# Patient Record
Sex: MALE | Race: WHITE | Employment: OTHER | URBAN - METROPOLITAN AREA
[De-identification: names, ages, dates, MRNs, and addresses within clinical notes are randomized per-mention and may not be internally consistent; named-entity substitution may affect disease eponyms.]

---

## 2017-02-22 DIAGNOSIS — Z85.51 PERSONAL HISTORY OF MALIGNANT NEOPLASM OF BLADDER: Primary | ICD-10-CM

## 2017-02-23 ENCOUNTER — OFFICE VISIT (OUTPATIENT)
Dept: UROLOGY | Facility: CLINIC | Age: 67
End: 2017-02-23
Payer: COMMERCIAL

## 2017-02-23 VITALS — WEIGHT: 133 LBS | HEIGHT: 68 IN | BODY MASS INDEX: 20.16 KG/M2

## 2017-02-23 DIAGNOSIS — Z85.51 PERSONAL HISTORY OF MALIGNANT NEOPLASM OF BLADDER: ICD-10-CM

## 2017-02-23 LAB
ALBUMIN UR-MCNC: NEGATIVE MG/DL
APPEARANCE UR: CLEAR
BILIRUB UR QL STRIP: NEGATIVE
COLOR UR AUTO: YELLOW
GLUCOSE UR STRIP-MCNC: NEGATIVE MG/DL
HGB UR QL STRIP: ABNORMAL
KETONES UR STRIP-MCNC: NEGATIVE MG/DL
LEUKOCYTE ESTERASE UR QL STRIP: NEGATIVE
NITRATE UR QL: NEGATIVE
PH UR STRIP: 5.5 PH (ref 5–7)
SP GR UR STRIP: 1.01 (ref 1–1.03)
URN SPEC COLLECT METH UR: ABNORMAL
UROBILINOGEN UR STRIP-ACNC: 0.2 EU/DL (ref 0.2–1)

## 2017-02-23 PROCEDURE — 81003 URINALYSIS AUTO W/O SCOPE: CPT | Performed by: UROLOGY

## 2017-02-23 PROCEDURE — 99212 OFFICE O/P EST SF 10 MIN: CPT | Mod: 25 | Performed by: UROLOGY

## 2017-02-23 PROCEDURE — 52000 CYSTOURETHROSCOPY: CPT | Performed by: UROLOGY

## 2017-02-23 RX ORDER — CIPROFLOXACIN 500 MG/1
500 TABLET, FILM COATED ORAL 2 TIMES DAILY
Qty: 1 TABLET | Refills: 0 | Status: SHIPPED | OUTPATIENT
Start: 2017-02-23 | End: 2017-12-07

## 2017-02-23 ASSESSMENT — PAIN SCALES - GENERAL: PAINLEVEL: NO PAIN (0)

## 2017-02-23 NOTE — NURSING NOTE
Chief Complaint   Patient presents with     Cystoscopy     History of bladder cancer     Prior to the start of the procedure and with procedural staff participation, I verbally confirmed the patient s identity using two indicators, relevant allergies, that the procedure was appropriate and matched the consent or emergent situation, and that the correct equipment/implants were available. Immediately prior to starting the procedure I conducted the Time Out with the procedural staff and re-confirmed the patient s name, procedure, and site/side. (The [facility] universal protocol was followed.)  Yes     Sedation (Moderate or Deep): None  Tracy Carter LPN

## 2017-02-23 NOTE — PROGRESS NOTES
History: This very pleasant 66-year-old gentleman returns today for check cystoscopy.  We recall he has a history of low-grade superficial bladder cancer who presented with gross hematuria in the past.  Initially had very extensive involvement of the bladder by papillary tumors and we had to do a very extensive resection.  Fortunately this was all low-grade noninvasive disease.  Initially he also had extraperitoneal leak so we left the catheter in for a prolonged period until a cystogram showed no evidence of residual leak of urine.  Since then he has done very well and the last to 6 months checks have both been negative.  He returns today for check cystoscopy once again  His general health is otherwise stable    Past Medical History   Diagnosis Date     Anemia      Bladder cancer (H)      History of blood transfusion 3/2015     bladder surgery(2 Units)     Hyperlipidemia      Hypertension        Social History     Social History     Marital status:      Spouse name: N/A     Number of children: N/A     Years of education: N/A     Social History Main Topics     Smoking status: Former Smoker     Smokeless tobacco: Never Used     Alcohol use No     Drug use: No     Sexual activity: Yes     Partners: Female     Other Topics Concern     Parent/Sibling W/ Cabg, Mi Or Angioplasty Before 65f 55m? No     Social History Narrative       Past Surgical History   Procedure Laterality Date     Cystoscopy, fulgurate bleeders, evacuate clot(s), combined N/A 3/2/2015     Procedure: COMBINED CYSTOSCOPY, FULGURATE BLEEDERS, EVACUATE CLOT(S);  Surgeon: Cody Torres MD;  Location:  OR     Cystoscopy, cystogram, combined N/A 3/2/2015     Procedure: COMBINED CYSTOSCOPY, CYSTOGRAM;  Surgeon: Cody Torres MD;  Location:  OR     Cystoscopy, transurethral resection (tur) tumor bladder, combined N/A 3/2/2015     Procedure: COMBINED CYSTOSCOPY, TRANSURETHRAL RESECTION (TUR) TUMOR BLADDER;  Surgeon: Cody Torres  "MD Álvaro;  Location:  OR     Cystoscopy, retrogrades, combined  3/2/2015     Procedure: COMBINED CYSTOSCOPY, RETROGRADES;  Surgeon: Cody Torres MD;  Location:  OR     Cystoscopy, transurethral resection (tur) tumor bladder, combined N/A 2015     Procedure: COMBINED CYSTOSCOPY, TRANSURETHRAL RESECTION (TUR) TUMOR BLADDER;  Surgeon: Cody Torres MD;  Location:  OR     Cystoscopy, retrogrades, combined  2015     Procedure: COMBINED CYSTOSCOPY, RETROGRADES;  Surgeon: Cody Torres MD;  Location:  OR     Laparoscopic herniorrhaphy inguinal Right 2015     Procedure: LAPAROSCOPIC HERNIORRHAPHY INGUINAL;  Surgeon: Levi Warren MD;  Location:  [facility]     Herniorrhaphy inguinal Right 2015     Procedure: HERNIORRHAPHY INGUINAL;  Surgeon: Levi Warren MD;  Location: [facility]       Family History   Problem Relation Age of Onset     HEART DISEASE Mother      CANCER Father 87     Lung cancer,  age 88     Cancer - colorectal Sister 30         Current Outpatient Prescriptions:      metoprolol (TOPROL-XL) 50 MG 24 hr tablet, Take 1 tablet (50 mg) by mouth daily, Disp: 90 tablet, Rfl: 3     Omega-3 Fatty Acids (OMEGA-3 FISH OIL PO), Take by mouth daily, Disp: , Rfl:     10 point ROS of systems including Constitutional, Eyes, Respiratory, Cardiovascular, Gastroenterology, Genitourinary, Integumentary, Muscularskeletal, Psychiatric were all negative except for pertinent positives noted in my HPI.    Examination:   Ht 1.727 m (5' 8\")  Wt 60.3 kg (133 lb)  BMI 20.22 kg/m2  General Impression: Very pleasant gentleman in no acute distress, well-oriented in time place and person  Mental Status: Normal    Procedure.  Cystoscopy.  Surgeon.   Melissa.  Anesthesia.  Local anesthesia.  Description.  With the patient in supine position and with the genital area prepped and draped in the customary fashion, a flexible cystoscope was Passed into the urethra.  The penile " "urethra is normal.  The external status intact.  There is mild enlargement of the lateral lobe of the prostate but no other significant instructed features.  The interior of the bladder is carefully inspected no obvious evidence of recurrent bladder tumors seen.  There is scarring from previous bladder resection noted.  There is one slightly inflamed area on the anterior surface of the bladder which does not appear sinister.  There were no other remarkable features    Impression: The urothelium is stable and this seems to be no evidence of recurrence of disease.  I would therefore recommend at this point that we see him again in 1 year for cystoscopy, fish test and cytology.  I explained the entire situation to the patient in detail.  We did have a careful discussion about the need for follow-up given the extensive initial volume of disease in the bladder when first diagnosed.  I answered all his questions    Plan: 1 year for cystoscopy, fish test    Time: 10 minutes is spent in addition to the cystoscopy note to discuss the findings, the talk about follow-up, and discuss other potential treatment options depending on circumstances encountered    \"This dictation was performed with voice recognition software and may contain errors,  omissions and inadvertent word substitution.\"    "

## 2017-02-23 NOTE — LETTER
2/23/2017       RE: Lobo Isaacs  [address]  [place] [address]     Dear Colleague,    Thank you for referring your patient, Lobo Isaacs, to the [facility] [place] at [facility]. Please see a copy of my visit note below.    History: This very pleasant 66-year-old gentleman returns today for check cystoscopy.  We recall he has a history of low-grade superficial bladder cancer who presented with gross hematuria in the past.  Initially had very extensive involvement of the bladder by papillary tumors and we had to do a very extensive resection.  Fortunately this was all low-grade noninvasive disease.  Initially he also had extraperitoneal leak so we left the catheter in for a prolonged period until a cystogram showed no evidence of residual leak of urine.  Since then he has done very well and the last to 6 months checks have both been negative.  He returns today for check cystoscopy once again  His general health is otherwise stable    Past Medical History   Diagnosis Date     Anemia      Bladder cancer (H)      History of blood transfusion 3/2015     bladder surgery(2 Units)     Hyperlipidemia      Hypertension        Social History     Social History     Marital status:      Spouse name: N/A     Number of children: N/A     Years of education: N/A     Social History Main Topics     Smoking status: Former Smoker     Smokeless tobacco: Never Used     Alcohol use No     Drug use: No     Sexual activity: Yes     Partners: Female     Other Topics Concern     Parent/Sibling W/ Cabg, Mi Or Angioplasty Before 65f 55m? No     Social History Narrative       Past Surgical History   Procedure Laterality Date     Cystoscopy, fulgurate bleeders, evacuate clot(s), combined N/A 3/2/2015     Procedure: COMBINED CYSTOSCOPY, FULGURATE BLEEDERS, EVACUATE CLOT(S);  Surgeon: Cody Torres MD;  Location:  OR     Cystoscopy, cystogram,  "combined N/A 3/2/2015     Procedure: COMBINED CYSTOSCOPY, CYSTOGRAM;  Surgeon: Cody Torres MD;  Location:  OR     Cystoscopy, transurethral resection (tur) tumor bladder, combined N/A 3/2/2015     Procedure: COMBINED CYSTOSCOPY, TRANSURETHRAL RESECTION (TUR) TUMOR BLADDER;  Surgeon: Cody Torres MD;  Location:  OR     Cystoscopy, retrogrades, combined  3/2/2015     Procedure: COMBINED CYSTOSCOPY, RETROGRADES;  Surgeon: Cody Torres MD;  Location: [facility]     Cystoscopy, transurethral resection (tur) tumor bladder, combined N/A 2015     Procedure: COMBINED CYSTOSCOPY, TRANSURETHRAL RESECTION (TUR) TUMOR BLADDER;  Surgeon: Cody Torres MD;  Location: [facility] OR     Cystoscopy, retrogrades, combined  2015     Procedure: COMBINED CYSTOSCOPY, RETROGRADES;  Surgeon: Cody Torres MD;  Location:  OR     Laparoscopic herniorrhaphy inguinal Right 2015     Procedure: LAPAROSCOPIC HERNIORRHAPHY INGUINAL;  Surgeon: Levi Warren MD;  Location:  [facility]     Herniorrhaphy inguinal Right 2015     Procedure: HERNIORRHAPHY INGUINAL;  Surgeon: Levi Warren MD;  Location: [facility]       Family History   Problem Relation Age of Onset     HEART DISEASE Mother      CANCER Father 87     Lung cancer,  age 88     Cancer - colorectal Sister 30         Current Outpatient Prescriptions:      metoprolol (TOPROL-XL) 50 MG 24 hr tablet, Take 1 tablet (50 mg) by mouth daily, Disp: 90 tablet, Rfl: 3     Omega-3 Fatty Acids (OMEGA-3 FISH OIL PO), Take by mouth daily, Disp: , Rfl:     10 point ROS of systems including Constitutional, Eyes, Respiratory, Cardiovascular, Gastroenterology, Genitourinary, Integumentary, Muscularskeletal, Psychiatric were all negative except for pertinent positives noted in my HPI.    Examination:   Ht 1.727 m (5' 8\")  Wt 60.3 kg (133 lb)  BMI 20.22 kg/m2  General Impression: Very pleasant gentleman in no acute distress, well-oriented in time " "place and person  Mental Status: Normal    Procedure.  Cystoscopy.  Surgeon.   Melissa.  Anesthesia.  Local anesthesia.  Description.  With the patient in supine position and with the genital area prepped and draped in the customary fashion, a flexible cystoscope was Passed into the urethra.  The penile urethra is normal.  The external status intact.  There is mild enlargement of the lateral lobe of the prostate but no other significant instructed features.  The interior of the bladder is carefully inspected no obvious evidence of recurrent bladder tumors seen.  There is scarring from previous bladder resection noted.  There is one slightly inflamed area on the anterior surface of the bladder which does not appear sinister.  There were no other remarkable features    Impression: The urothelium is stable and this seems to be no evidence of recurrence of disease.  I would therefore recommend at this point that we see him again in 1 year for cystoscopy, fish test and cytology.  I explained the entire situation to the patient in detail.  We did have a careful discussion about the need for follow-up given the extensive initial volume of disease in the bladder when first diagnosed.  I answered all his questions    Plan: 1 year for cystoscopy, fish test    Time: 10 minutes is spent in addition to the cystoscopy note to discuss the findings, the talk about follow-up, and discuss other potential treatment options depending on circumstances encountered    \"This dictation was performed with voice recognition software and may contain errors,  omissions and inadvertent word substitution.\"    Again, thank you for allowing me to participate in the care of your patient.      Sincerely,    Cody Torres MD      "

## 2017-02-23 NOTE — PATIENT INSTRUCTIONS
"     AFTER YOUR CYSTOSCOPY         You have just completed a cystoscopy, or \"cysto\", which allowed your physician to learn more about your bladder (or to remove a stent placed after surgery). We suggest that you continue to avoid caffeine, fruit juice, and alcohol for the next 24 hours, however, you are encouraged to return to your normal activities.       A few things that are considered normal after your cystoscopy:    * small amount of bleeding (or spotting) that clears within the next 24 hours    * slight burning sensation with urination    * sensation to of needing to avoid more frequently    * the feeling of \"air\" in your urine    * mild discomfort that is relieved with Tylonol        Please contact our office promptly if you:    * develop a fever above 101 degrees    * are unable to urinate    * develop bright red blood that does not stop    * severe pain or swelling        And of course, please contact our office with any concerns or questions [phone]      "

## 2017-02-23 NOTE — MR AVS SNAPSHOT
"              After Visit Summary   2/23/2017    Lobo Isaacs    MRN: [id number]           Patient Information     Date Of Birth          1950        Visit Information        Provider Department      2/23/2017 3:00 PM Cody Torres MD; [facility] [place]        Today's Diagnoses     Personal history of malignant neoplasm of bladder          Care Instructions         AFTER YOUR CYSTOSCOPY         You have just completed a cystoscopy, or \"cysto\", which allowed your physician to learn more about your bladder (or to remove a stent placed after surgery). We suggest that you continue to avoid caffeine, fruit juice, and alcohol for the next 24 hours, however, you are encouraged to return to your normal activities.       A few things that are considered normal after your cystoscopy:    * small amount of bleeding (or spotting) that clears within the next 24 hours    * slight burning sensation with urination    * sensation to of needing to avoid more frequently    * the feeling of \"air\" in your urine    * mild discomfort that is relieved with Tylonol        Please contact our office promptly if you:    * develop a fever above 101 degrees    * are unable to urinate    * develop bright red blood that does not stop    * severe pain or swelling        And of course, please contact our office with any concerns or questions [phone]            Follow-ups after your visit        Follow-up notes from your care team     Return in about 1 year (around 2/23/2018) for cystoscopy, fish test.      Who to contact     If you have questions or need follow up information about today's clinic visit or your schedule please contact [facility] [place] directly at [phone].  Normal or non-critical lab and imaging results will be communicated to you by MyChart, letter or phone within 4 business days after the clinic has received the results. If you do " "not hear from us within 7 days, please contact the clinic through [employer] or phone. If you have a critical or abnormal lab result, we will notify you by phone as soon as possible.  Submit refill requests through [employer] or call your pharmacy and they will forward the refill request to us. Please allow 3 business days for your refill to be completed.          Additional Information About Your Visit        [employer] Information     [employer] lets you send messages to your doctor, view your test results, renew your prescriptions, schedule appointments and more. To sign up, go to [email or web address] . Click on \"Log in\" on the left side of the screen, which will take you to the Welcome page. Then click on \"Sign up Now\" on the right side of the page.     You will be asked to enter the access code listed below, as well as some personal information. Please follow the directions to create your username and password.     Your access code is: [email or web address]  Expires: 2017  3:25 PM     Your access code will  in 90 days. If you need help or a new code, please call your [place] clinic or [phone].        Care EveryWhere ID     This is your Care EveryWhere ID. This could be used by other organizations to access your [place] medical records  [id number]        Your Vitals Were     Height BMI (Body Mass Index)                1.727 m (5' 8\") 20.22 kg/m2           Blood Pressure from Last 3 Encounters:   10/06/16 128/62   10/13/15 138/80   09/04/15 135/78    Weight from Last 3 Encounters:   17 60.3 kg (133 lb)   10/06/16 60.4 kg (133 lb 3.2 oz)   10/13/15 59.7 kg (131 lb 9.6 oz)              We Performed the Following     CYSTOURETHROSCOPY (47650)     UA without Microscopic          Today's Medication Changes          These changes are accurate as of: 17  3:25 PM.  If you have any questions, ask your nurse or doctor.               Start taking these medicines.        Dose/Directions    ciprofloxacin 500 MG " tablet   Commonly known as:  CIPRO   Used for:  Personal history of malignant neoplasm of bladder        Dose:  500 mg   Take 1 tablet (500 mg) by mouth 2 times daily   Quantity:  1 tablet   Refills:  0            Where to get your medicines      These medications were sent to [facility] [place] - [address]  [address], [place] [address]     Phone:  [phone]     ciprofloxacin 500 MG tablet                Primary Care Provider Office Phone # Fax #    Efra Crockett [phone]       [facility] [address]  [place] [address]        Thank you!     Thank you for choosing [facility] [place]  for your care. Our goal is always to provide you with excellent care. Hearing back from our patients is one way we can continue to improve our services. Please take a few minutes to complete the written survey that you may receive in the mail after your visit with us. Thank you!             Your Updated Medication List - Protect others around you: Learn how to safely use, store and throw away your medicines at www.disposemymeds.org.          This list is accurate as of: 2/23/17  3:25 PM.  Always use your most recent med list.                   Brand Name Dispense Instructions for use    ciprofloxacin 500 MG tablet    CIPRO    1 tablet    Take 1 tablet (500 mg) by mouth 2 times daily       metoprolol 50 MG 24 hr tablet    TOPROL-XL    90 tablet    Take 1 tablet (50 mg) by mouth daily       OMEGA-3 FISH OIL PO      Take by mouth daily

## 2017-12-07 ENCOUNTER — OFFICE VISIT (OUTPATIENT)
Dept: FAMILY MEDICINE | Facility: CLINIC | Age: 67
End: 2017-12-07
Payer: COMMERCIAL

## 2017-12-07 VITALS
RESPIRATION RATE: 16 BRPM | OXYGEN SATURATION: 99 % | HEART RATE: 56 BPM | TEMPERATURE: 96.8 F | WEIGHT: 134 LBS | SYSTOLIC BLOOD PRESSURE: 120 MMHG | DIASTOLIC BLOOD PRESSURE: 80 MMHG | BODY MASS INDEX: 20.37 KG/M2

## 2017-12-07 DIAGNOSIS — Z00.00 ROUTINE HISTORY AND PHYSICAL EXAMINATION OF ADULT: ICD-10-CM

## 2017-12-07 DIAGNOSIS — R63.6 UNDERWEIGHT: ICD-10-CM

## 2017-12-07 DIAGNOSIS — E78.5 HYPERLIPIDEMIA WITH TARGET LDL LESS THAN 130: ICD-10-CM

## 2017-12-07 DIAGNOSIS — C67.9 MALIGNANT NEOPLASM OF URINARY BLADDER, UNSPECIFIED SITE (H): ICD-10-CM

## 2017-12-07 DIAGNOSIS — Z12.5 SPECIAL SCREENING FOR MALIGNANT NEOPLASM OF PROSTATE: ICD-10-CM

## 2017-12-07 DIAGNOSIS — Z23 NEED FOR PROPHYLACTIC VACCINATION AND INOCULATION AGAINST INFLUENZA: Primary | ICD-10-CM

## 2017-12-07 DIAGNOSIS — I10 HYPERTENSION GOAL BP (BLOOD PRESSURE) < 140/80: ICD-10-CM

## 2017-12-07 DIAGNOSIS — M75.102 ROTATOR CUFF SYNDROME OF LEFT SHOULDER: ICD-10-CM

## 2017-12-07 DIAGNOSIS — H25.092 AGE-RELATED INCIPIENT CATARACT OF LEFT EYE: ICD-10-CM

## 2017-12-07 DIAGNOSIS — H90.3 BILATERAL SENSORINEURAL HEARING LOSS: ICD-10-CM

## 2017-12-07 LAB
ALBUMIN UR-MCNC: NEGATIVE MG/DL
APPEARANCE UR: CLEAR
BILIRUB UR QL STRIP: NEGATIVE
COLOR UR AUTO: YELLOW
ERYTHROCYTE [DISTWIDTH] IN BLOOD BY AUTOMATED COUNT: 13.1 % (ref 10–15)
GLUCOSE UR STRIP-MCNC: NEGATIVE MG/DL
HCT VFR BLD AUTO: 42.7 % (ref 40–53)
HGB BLD-MCNC: 14.2 G/DL (ref 13.3–17.7)
HGB UR QL STRIP: ABNORMAL
KETONES UR STRIP-MCNC: NEGATIVE MG/DL
LEUKOCYTE ESTERASE UR QL STRIP: NEGATIVE
MCH RBC QN AUTO: 30.3 PG (ref 26.5–33)
MCHC RBC AUTO-ENTMCNC: 33.3 G/DL (ref 31.5–36.5)
MCV RBC AUTO: 91 FL (ref 78–100)
NITRATE UR QL: NEGATIVE
PH UR STRIP: 6 PH (ref 5–7)
PLATELET # BLD AUTO: 224 10E9/L (ref 150–450)
RBC # BLD AUTO: 4.68 10E12/L (ref 4.4–5.9)
RBC #/AREA URNS AUTO: NORMAL /HPF
SOURCE: ABNORMAL
SP GR UR STRIP: 1.01 (ref 1–1.03)
UROBILINOGEN UR STRIP-ACNC: 0.2 EU/DL (ref 0.2–1)
WBC # BLD AUTO: 6.5 10E9/L (ref 4–11)
WBC #/AREA URNS AUTO: NORMAL /HPF

## 2017-12-07 PROCEDURE — 81001 URINALYSIS AUTO W/SCOPE: CPT | Performed by: FAMILY MEDICINE

## 2017-12-07 PROCEDURE — 36415 COLL VENOUS BLD VENIPUNCTURE: CPT | Performed by: FAMILY MEDICINE

## 2017-12-07 PROCEDURE — 90662 IIV NO PRSV INCREASED AG IM: CPT | Performed by: FAMILY MEDICINE

## 2017-12-07 PROCEDURE — 80048 BASIC METABOLIC PNL TOTAL CA: CPT | Performed by: FAMILY MEDICINE

## 2017-12-07 PROCEDURE — 80061 LIPID PANEL: CPT | Performed by: FAMILY MEDICINE

## 2017-12-07 PROCEDURE — 85027 COMPLETE CBC AUTOMATED: CPT | Performed by: FAMILY MEDICINE

## 2017-12-07 PROCEDURE — G0008 ADMIN INFLUENZA VIRUS VAC: HCPCS | Performed by: FAMILY MEDICINE

## 2017-12-07 PROCEDURE — G0103 PSA SCREENING: HCPCS | Performed by: FAMILY MEDICINE

## 2017-12-07 PROCEDURE — G0439 PPPS, SUBSEQ VISIT: HCPCS | Performed by: FAMILY MEDICINE

## 2017-12-07 NOTE — NURSING NOTE
"Chief Complaint   Patient presents with     Wellness Visit       Initial /78  Pulse 56  Temp 96.8  F (36  C) (Tympanic)  Resp 16  Wt 134 lb (60.8 kg)  SpO2 99%  BMI 20.37 kg/m2 Estimated body mass index is 20.37 kg/(m^2) as calculated from the following:    Height as of 2/23/17: 5' 8\" (1.727 m).    Weight as of this encounter: 134 lb (60.8 kg).  Medication Reconciliation: complete   Yenny Jackson CMA    "

## 2017-12-07 NOTE — LETTER
[facility]  [address]  [address]  [place] [address] [phone]          December 11, 2017    Lobo Isaacs                                                                                                                     [address]  [place] [address]            Dear Lobo,    NORMAL COMPLETE BLOOD PANEL WBCS RBCS AND PLATELETS   NORMAL URINE ANALYSIS   NORMAL PSA OR PROSTATE CANCER SCREENING TEST,     SMALL AMOUNT OF BLOOD  ON URINE ANALYSIS   KEEP FOLLOWING WITH YOUR UROLOGIST   Results for orders placed or performed in visit on 12/07/17   Lipid panel reflex to direct LDL Fasting   Result Value Ref Range    Cholesterol 270 (H) <200 mg/dL    Triglycerides 70 <150 mg/dL    HDL Cholesterol 78 >39 mg/dL    LDL Cholesterol Calculated 178 (H) <100 mg/dL    Non HDL Cholesterol 192 (H) <130 mg/dL   Basic metabolic panel   Result Value Ref Range    Sodium 138 133 - 144 mmol/L    Potassium 4.7 3.4 - 5.3 mmol/L    Chloride 104 94 - 109 mmol/L    Carbon Dioxide 20 20 - 32 mmol/L    Anion Gap 14 3 - 14 mmol/L    Glucose 85 70 - 99 mg/dL    Urea Nitrogen 17 7 - 30 mg/dL    Creatinine 0.85 0.66 - 1.25 mg/dL    GFR Estimate 90 >60 mL/min/1.7m2    GFR Estimate If Black >90 >60 mL/min/1.7m2    Calcium 8.6 8.5 - 10.1 mg/dL   Prostate spec antigen screen   Result Value Ref Range    PSA 0.40 0 - 4 ug/L   CBC with platelets   Result Value Ref Range    WBC 6.5 4.0 - 11.0 10e9/L    RBC Count 4.68 4.4 - 5.9 10e12/L    Hemoglobin 14.2 13.3 - 17.7 g/dL    Hematocrit 42.7 40.0 - 53.0 %    MCV 91 78 - 100 fl    MCH 30.3 26.5 - 33.0 pg    MCHC 33.3 31.5 - 36.5 g/dL    RDW 13.1 10.0 - 15.0 %    Platelet Count 224 150 - 450 10e9/L   UA reflex to Microscopic and Culture   Result Value Ref Range    Color Urine Yellow     Appearance Urine Clear     Glucose Urine Negative NEG^Negative mg/dL    Bilirubin Urine Negative NEG^Negative    Ketones Urine Negative NEG^Negative mg/dL     Specific Gravity Urine 1.010 1.003 - 1.035    Blood Urine Small (A) NEG^Negative    pH Urine 6.0 5.0 - 7.0 pH    Protein Albumin Urine Negative NEG^Negative mg/dL    Urobilinogen Urine 0.2 0.2 - 1.0 EU/dL    Nitrite Urine Negative NEG^Negative    Leukocyte Esterase Urine Negative NEG^Negative    Source Midstream Urine    Urine Microscopic   Result Value Ref Range    WBC Urine O - 2 OTO2^O - 2 /HPF    RBC Urine O - 2 OTO2^O - 2 /HPF         Sincerely,       Efra Crockett MD

## 2017-12-07 NOTE — PATIENT INSTRUCTIONS
(Z23) Need for prophylactic vaccination and inoculation against influenza  (primary encounter diagnosis)    Comment:      Plan: FLU VACCINE, INCREASED ANTIGEN, PRESV FREE, AGE          65+ [52796], Vaccine Administration, Initial           [41237]                   (C67.9) Malignant neoplasm of urinary bladder, unspecified site (H)    Comment:      Plan: UA reflex to Microscopic and Culture                   (I10) Hypertension goal BP (blood pressure) < 140/80    Comment:      Plan: Basic metabolic panel                   (M75.102) Rotator cuff syndrome of left shoulder    Comment:      Plan:          (H90.3) Bilateral sensorineural hearing loss    Comment:      Plan:          (R63.6) Underweight    Comment:      Plan:          (H25.092) Age-related incipient cataract of left eye    Comment:      Plan:          (E78.5) Hyperlipidemia with target LDL less than 130    Comment:      Plan: Lipid panel reflex to direct LDL Fasting                   (Z12.5) Special screening for malignant neoplasm of prostate    Comment:      Plan: Prostate spec antigen screen                   (Z00.00) Routine history and physical examination of adult    Comment:      Plan: CBC with platelets

## 2017-12-07 NOTE — MR AVS SNAPSHOT
After Visit Summary   12/7/2017    Lobo Isaacs    MRN: [id number]           Patient Information     Date Of Birth          1950        Visit Information        Provider Department      12/7/2017 8:30 AM Efra Crockett MD [facility]        Today's Diagnoses     Need for prophylactic vaccination and inoculation against influenza    -  1    Malignant neoplasm of urinary bladder, unspecified site (H)        Hypertension goal BP (blood pressure) < 140/80        Rotator cuff syndrome of left shoulder        Bilateral sensorineural hearing loss        Underweight        Age-related incipient cataract of left eye        Hyperlipidemia with target LDL less than 130        Special screening for malignant neoplasm of prostate        Routine history and physical examination of adult          Care Instructions      (Z23) Need for prophylactic vaccination and inoculation against influenza  (primary encounter diagnosis)    Comment:      Plan: FLU VACCINE, INCREASED ANTIGEN, PRESV FREE, AGE          65+ [13506], Vaccine Administration, Initial           [53536]                   (C67.9) Malignant neoplasm of urinary bladder, unspecified site (H)    Comment:      Plan: UA reflex to Microscopic and Culture                   (I10) Hypertension goal BP (blood pressure) < 140/80    Comment:      Plan: Basic metabolic panel                   (M75.102) Rotator cuff syndrome of left shoulder    Comment:      Plan:          (H90.3) Bilateral sensorineural hearing loss    Comment:      Plan:          (R63.6) Underweight    Comment:      Plan:          (H25.092) Age-related incipient cataract of left eye    Comment:      Plan:          (E78.5) Hyperlipidemia with target LDL less than 130    Comment:      Plan: Lipid panel reflex to direct LDL Fasting                   (Z12.5) Special screening for malignant neoplasm of prostate    Comment:      Plan: Prostate spec antigen  "screen                   (Z00.00) Routine history and physical examination of adult    Comment:      Plan: CBC with platelets                             Follow-ups after your visit        Who to contact     If you have questions or need follow up information about today's clinic visit or your schedule please contact [facility] directly at [phone].  Normal or non-critical lab and imaging results will be communicated to you by MyChart, letter or phone within 4 business days after the clinic has received the results. If you do not hear from us within 7 days, please contact the clinic through MyChart or phone. If you have a critical or abnormal lab result, we will notify you by phone as soon as possible.  Submit refill requests through [employer] or call your pharmacy and they will forward the refill request to us. Please allow 3 business days for your refill to be completed.          Additional Information About Your Visit        MyChart Information     [employer] lets you send messages to your doctor, view your test results, renew your prescriptions, schedule appointments and more. To sign up, go to [email or web address] . Click on \"Log in\" on the left side of the screen, which will take you to the Welcome page. Then click on \"Sign up Now\" on the right side of the page.     You will be asked to enter the access code listed below, as well as some personal information. Please follow the directions to create your username and password.     Your access code is: [email or web address]  Expires: 3/7/2018  9:35 AM     Your access code will  in 90 days. If you need help or a new code, please call your [facility] or [phone].        Care EveryWhere ID     This is your Care EveryWhere ID. This could be used by other organizations to access your [place] medical records  [id number]        Your Vitals Were     Pulse Temperature Respirations Pulse Oximetry BMI (Body Mass Index)       56 " 96.8  F (36  C) (Tympanic) 16 99% 20.37 kg/m2        Blood Pressure from Last 3 Encounters:   12/07/17 120/80   10/06/16 128/62   10/13/15 138/80    Weight from Last 3 Encounters:   12/07/17 134 lb (60.8 kg)   02/23/17 133 lb (60.3 kg)   10/06/16 133 lb 3.2 oz (60.4 kg)              We Performed the Following     Basic metabolic panel     CBC with platelets     FLU VACCINE, INCREASED ANTIGEN, PRESV FREE, AGE 65+ [35922]     Lipid panel reflex to direct LDL Fasting     Prostate spec antigen screen     UA reflex to Microscopic and Culture     Vaccine Administration, Initial [[id number]]        Primary Care Provider Office Phone # Fax #    Efra Crockett [phone] [address] [facility] [address]        Equal Access to Services     NAS BARRON : Hadii aad ku hadasho Soomaali, waaxda luqadaha, qaybta kaalmada adeegyada, waxay latricein haykatien stu stubbs . So [facility] [phone].    ATENCIÓN: Si habla español, tiene a diaz disposición servicios gratuitos de asistencia lingüística. Llame al [phone].    We comply with applicable federal civil rights laws and [place] laws. We do not discriminate on the basis of race, color, national origin, age, disability, sex, sexual orientation, or gender identity.            Thank you!     Thank you for choosing [facility]  for your care. Our goal is always to provide you with excellent care. Hearing back from our patients is one way we can continue to improve our services. Please take a few minutes to complete the written survey that you may receive in the mail after your visit with us. Thank you!             Your Updated Medication List - Protect others around you: Learn how to safely use, store and throw away your medicines at www.disposemymeds.org.          This list is accurate as of: 12/7/17  9:35 AM.  Always use your most recent med list.                   Brand Name Dispense Instructions for use  Diagnosis    metoprolol 50 MG 24 hr tablet    TOPROL-XL    90 tablet    Take 1 tablet (50 mg) by mouth daily    Hypertension goal BP (blood pressure) < 140/80       OMEGA-3 FISH OIL PO      Take by mouth daily

## 2017-12-07 NOTE — PROGRESS NOTES
SUBJECTIVE:   Lobo Isaacs is a 67 year old male who presents for Preventive Visit.  Are you in the first 12 months of your Medicare coverage?  No    Physical   Annual:     Getting at least 3 servings of Calcium per day::  NO    Bi-annual eye exam::  NO    Dental care twice a year::  NO    Sleep apnea or symptoms of sleep apnea::  Sleep apnea    Frequency of exercise::  None    Taking medications regularly::  Yes    Medication side effects::  None    Additional concerns today::  No      COGNITIVE SCREEN  1) Repeat 3 items (Banana, Sunrise, Chair)    2) Clock draw: NORMAL  3) 3 item recall: Recalls 3 objects  Results: 3 items recalled: COGNITIVE IMPAIRMENT LESS LIKELY    Mini-CogTM Copyright S Celia. Licensed by the author for use in [place] [employer]; reprinted with permission (soaneta@[email or web address]). All rights reserved.          Reviewed and updated as needed this visit by clinical staff         Reviewed and updated as needed this visit by Provider      Social History   Substance Use Topics     Smoking status: Former Smoker     Smokeless tobacco: Never Used     Alcohol use No       The patient does not drink >3 drinks per day nor >7 drinks per week.          Spot on right shoulder,     Today's PHQ-2 Score: PHQ-2 ( 1999 Pfizer) 12/7/2017   Q1: Little interest or pleasure in doing things 0   Q2: Feeling down, depressed or hopeless 0   PHQ-2 Score 0   Q1: Little interest or pleasure in doing things Not at all   Q2: Feeling down, depressed or hopeless Not at all   PHQ-2 Score 0       Do you feel safe in your environment - Yes    Do you have a Health Care Directive?: Yes: Advance Directive has been received and scanned.    Current providers sharing in care for this patient include:   Patient Care Team:  Efra Crockett MD as PCP - General (Family Practice)  Cody Torres MD as MD (Urology)      Hearing impairment: No    Ability to successfully perform activities of daily living: Yes, no assistance  needed     Fall risk:         Home safety:  none identified  click delete button to remove this line now    The following health maintenance items are reviewed in Epic and correct as of today:Health Maintenance   Topic Date Due     HEPATITIS C SCREENING  10/12/1966     INFLUENZA VACCINE (SYSTEM ASSIGNED)  09/01/2017     FALL RISK ASSESSMENT  10/06/2017     COLONOSCOPY Q5 YR  01/29/2018     ADVANCE DIRECTIVE PLANNING Q5 YRS  07/23/2020     LIPID SCREEN Q5 YR MALE (SYSTEM ASSIGNED)  11/03/2020     TETANUS IMMUNIZATION (SYSTEM ASSIGNED)  12/27/2022     PNEUMOCOCCAL  Completed     AORTIC ANEURYSM SCREENING (SYSTEM ASSIGNED)  Completed       Labs reviewed in EPIC  BP Readings from Last 3 Encounters:   12/07/17 120/80   10/06/16 128/62   10/13/15 138/80    Wt Readings from Last 3 Encounters:   12/07/17 134 lb (60.8 kg)   02/23/17 133 lb (60.3 kg)   10/06/16 133 lb 3.2 oz (60.4 kg)                  Patient Active Problem List   Diagnosis     Microscopic hematuria     Hyperlipidemia with target LDL less than 130     Primary bladder papillary carcinoma (H)     Hypertension goal BP (blood pressure) < 140/80     Bladder cancer (H)     Bladder tumor     Abscess, bladder     Health Care Home     ACP (advance care planning)     Rotator cuff syndrome of left shoulder     Right foot pain     Primary osteoarthritis of right knee     Underweight     Bilateral sensorineural hearing loss     Senile cataract of left eye     Past Surgical History:   Procedure Laterality Date     CYSTOSCOPY, CYSTOGRAM, COMBINED N/A 3/2/2015    Procedure: COMBINED CYSTOSCOPY, CYSTOGRAM;  Surgeon: Cody Torres MD;  Location:  OR     CYSTOSCOPY, FULGURATE BLEEDERS, EVACUATE CLOT(S), COMBINED N/A 3/2/2015    Procedure: COMBINED CYSTOSCOPY, FULGURATE BLEEDERS, EVACUATE CLOT(S);  Surgeon: Cody Torres MD;  Location:  OR     CYSTOSCOPY, RETROGRADES, COMBINED  3/2/2015    Procedure: COMBINED CYSTOSCOPY, RETROGRADES;  Surgeon: Cody Torres  MD Álvaro;  Location:  OR     CYSTOSCOPY, RETROGRADES, COMBINED  2015    Procedure: COMBINED CYSTOSCOPY, RETROGRADES;  Surgeon: Cody Torres MD;  Location:  OR     CYSTOSCOPY, TRANSURETHRAL RESECTION (TUR) TUMOR BLADDER, COMBINED N/A 3/2/2015    Procedure: COMBINED CYSTOSCOPY, TRANSURETHRAL RESECTION (TUR) TUMOR BLADDER;  Surgeon: Cody Torres MD;  Location: [facility]     CYSTOSCOPY, TRANSURETHRAL RESECTION (TUR) TUMOR BLADDER, COMBINED N/A 2015    Procedure: COMBINED CYSTOSCOPY, TRANSURETHRAL RESECTION (TUR) TUMOR BLADDER;  Surgeon: Cody Torres MD;  Location:  OR     HERNIORRHAPHY INGUINAL Right 2015    Procedure: HERNIORRHAPHY INGUINAL;  Surgeon: Levi Warren MD;  Location:  [facility]     LAPAROSCOPIC HERNIORRHAPHY INGUINAL Right 2015    Procedure: LAPAROSCOPIC HERNIORRHAPHY INGUINAL;  Surgeon: Levi Warren MD;  Location:  [facility]       Social History   Substance Use Topics     Smoking status: Former Smoker     Smokeless tobacco: Never Used     Alcohol use No     Family History   Problem Relation Age of Onset     HEART DISEASE Mother      CANCER Father 87     Lung cancer,  age 88     Cancer - colorectal Sister 30         Current Outpatient Prescriptions   Medication Sig Dispense Refill     metoprolol (TOPROL-XL) 50 MG 24 hr tablet Take 1 tablet (50 mg) by mouth daily 90 tablet 3     Omega-3 Fatty Acids (OMEGA-3 FISH OIL PO) Take by mouth daily       No Known Allergies  Recent Labs   Lab Test  10/06/16   0916  11/03/15   0930  09/02/15   1445  05/28/15   0946  03/12/15   0606   01/28/15   1506   LDL   --   144*   --   107   --    --   177*   HDL   --   70   --   67   --    --   72   TRIG   --   52   --   55   --    --   57   ALT  21   --    --   19  18   < >   --    CR  0.93   --   1.00  0.90  0.73   < >  1.00   GFRESTIMATED  81   --   75  84  >90  Non  GFR Calc     < >  75   GFRESTBLACK  >90   --   >90  >90  >90    "GFR Calc     < >  >90   POTASSIUM  4.7   --   4.6  4.6  3.9   < >  4.8    < > = values in this interval not displayed.            HEMATURIA ASYMPTOMATIC HISTORY OF PRIMARY BLADDER CARCINOMA    HYPERTENSION WITH GOAL OF LESS THAN 140/80     HISTORY OF ROTATOR CUFF SYNDROME LEFT SHOULDER    RIGHT FOOT PAIN    OSTEOARTHRITIS RIGHT KNEE     UNDER WEIGHT     BILATERAL HEARING LOSS      Review of Systems  C: NEGATIVE for fever, chills, change in weight  I: NEGATIVE for worrisome rashes, moles or lesions  E: NEGATIVE for vision changes or irritation  E/M: NEGATIVE for ear, mouth and throat problems  R: NEGATIVE for significant cough or SOB  B: NEGATIVE for masses, tenderness or discharge  CV: NEGATIVE for chest pain, palpitations or peripheral edema  GI: NEGATIVE for nausea, abdominal pain, heartburn, or change in bowel habits  : NEGATIVE for frequency, dysuria, or hematuria MICROSCOPIC HEMATURIA  ON URINE ANALYSIS   HISTORY OF BLADDER CANCER  IMPROVED BENIGN PROSTATIC HYPERTROPHY SYMPTOMS AFTER SURGERY   OCCASIONAL NOCTURIA  MUSCULOSKELETAL: LEFT SHOULDER IMPROVED   N: NEGATIVE for weakness, dizziness or paresthesias  E: NEGATIVE for temperature intolerance, skin/hair changes  H: NEGATIVE for bleeding problems  P: NEGATIVE for changes in mood or affect    OBJECTIVE:   There were no vitals taken for this visit. Estimated body mass index is 20.22 kg/(m^2) as calculated from the following:    Height as of 2/23/17: 5' 8\" (1.727 m).    Weight as of 2/23/17: 133 lb (60.3 kg).  Physical Exam  GENERAL: healthy, alert and no distress  EYES: Eyes grossly normal to inspection, PERRL and conjunctivae and sclerae normal  HENT: ear canals and TM's normal, nose and mouth without ulcers or lesions  NECK: no adenopathy, no asymmetry, masses, or scars and thyroid normal to palpation  RESP: lungs clear to auscultation - no rales, rhonchi or wheezes  CV: regular rate and rhythm, normal S1 S2, no S3 or S4, no murmur, click or rub, no " peripheral edema and peripheral pulses strong  ABDOMEN: soft, nontender, no hepatosplenomegaly, no masses and bowel sounds normal   (male): normal male genitalia without lesions or urethral discharge, no hernia  RECTAL (male): normal sphincter tone, no rectal masses, prostate normal size, smooth, nontender without nodules or masses  MS: no gross musculoskeletal defects noted, no edema  SKIN: no suspicious lesions or rashes  OLD HEALED ASYMPTOMATIC RIGHT HERNIA SCAR   FRECKLES ON SHOULDER BENIGN  NEURO: Normal strength and tone, mentation intact and speech normal  BACK: no CVA tenderness, no paralumbar tenderness  PSYCH: mentation appears normal, affect normal/bright  LYMPH: no cervical, supraclavicular, axillary, or inguinal adenopathy  Diabetic foot exam: normal DP and PT pulses, no trophic changes or ulcerative lesions, normal sensory exam and normal monofilament exam    ASSESSMENT / PLAN:       ICD-10-CM    1. Need for prophylactic vaccination and inoculation against influenza Z23 FLU VACCINE, INCREASED ANTIGEN, PRESV FREE, AGE 65+ [53696]     Vaccine Administration, Initial [90243]     Urine Microscopic   2. Malignant neoplasm of urinary bladder, unspecified site (H) C67.9 UA reflex to Microscopic and Culture   3. Hypertension goal BP (blood pressure) < 140/80 I10 Basic metabolic panel   4. Rotator cuff syndrome of left shoulder M75.102    5. Bilateral sensorineural hearing loss H90.3    6. Underweight R63.6    7. Age-related incipient cataract of left eye H25.092    8. Hyperlipidemia with target LDL less than 130 E78.5 Lipid panel reflex to direct LDL Fasting   9. Special screening for malignant neoplasm of prostate Z12.5 Prostate spec antigen screen   10. Routine history and physical examination of adult Z00.00 CBC with platelets       End of Life Planning:  Patient currently has an advanced directive:  DISCUSSION    COUNSELING:  Reviewed preventive health counseling, as reflected in patient instructions      "  Regular exercise       Healthy diet/nutrition       Vision screening       Hearing screening       Dental care        Estimated body mass index is 20.22 kg/(m^2) as calculated from the following:    Height as of 2/23/17: 5' 8\" (1.727 m).    Weight as of 2/23/17: 133 lb (60.3 kg).     reports that he has quit smoking. He has never used smokeless tobacco.        Appropriate preventive services were discussed with this patient, including applicable screening as appropriate for cardiovascular disease, diabetes, osteopenia/osteoporosis, and glaucoma.  As appropriate for age/gender, discussed screening for colorectal cancer, prostate cancer, breast cancer, and cervical cancer. Checklist reviewing preventive services available has been given to the patient.    Reviewed patients plan of care and provided an AVS. The Basic Care Plan (routine screening as documented in Health Maintenance) for Lobo meets the Care Plan requirement. This Care Plan has been established and reviewed with the Patient.    Counseling Resources:  ATP IV Guidelines  Pooled Cohorts Equation Calculator  Breast Cancer Risk Calculator  FRAX Risk Assessment  ICSI Preventive Guidelines  Dietary Guidelines for Americans, 2010  [employer]'s MyPlate  ASA Prophylaxis  Lung CA Screening    EVERTON TRIPLETT MD  [facility] Influenza Immunization Documentation    1.  Is the person to be vaccinated sick today?   No    2. Does the person to be vaccinated have an allergy to a component   of the vaccine?   No  Egg Allergy Algorithm Link    3. Has the person to be vaccinated ever had a serious reaction   to influenza vaccine in the past?   No    4. Has the person to be vaccinated ever had Guillain-Barré syndrome?   No    Form completed by lme           "

## 2017-12-08 LAB — PSA SERPL-ACNC: 0.4 UG/L (ref 0–4)

## 2017-12-09 LAB
ANION GAP SERPL CALCULATED.3IONS-SCNC: 14 MMOL/L (ref 3–14)
BUN SERPL-MCNC: 17 MG/DL (ref 7–30)
CALCIUM SERPL-MCNC: 8.6 MG/DL (ref 8.5–10.1)
CHLORIDE SERPL-SCNC: 104 MMOL/L (ref 94–109)
CHOLEST SERPL-MCNC: 270 MG/DL
CO2 SERPL-SCNC: 20 MMOL/L (ref 20–32)
CREAT SERPL-MCNC: 0.85 MG/DL (ref 0.66–1.25)
GFR SERPL CREATININE-BSD FRML MDRD: 90 ML/MIN/1.7M2
GLUCOSE SERPL-MCNC: 85 MG/DL (ref 70–99)
HDLC SERPL-MCNC: 78 MG/DL
LDLC SERPL CALC-MCNC: 178 MG/DL
NONHDLC SERPL-MCNC: 192 MG/DL
POTASSIUM SERPL-SCNC: 4.7 MMOL/L (ref 3.4–5.3)
SODIUM SERPL-SCNC: 138 MMOL/L (ref 133–144)
TRIGL SERPL-MCNC: 70 MG/DL

## 2017-12-10 NOTE — PROGRESS NOTES
"Please send normal lab letter when labs are complete  NORMAL COMPLETE BLOOD PANEL WBCS RBCS AND PLATELETS   NORMAL URINE ANALYSIS   NORMAL PSA OR PROSTATE CANCER SCREENING TEST,    HIGH TOTAL CHOLESTEROL   NORMAL TRIGLYCERIDES   EVERTON TRIPLETT JR., MD   NORMAL HDL OR \"GOOD\" CHOLESTEROL   VERY HIGH LDL OR \"BAD\" CHOLESTEROL   HIGH VERY LOW DENSITY CHOLESTEROL   EVERTON TRIPLETT JR., MD    "

## 2017-12-15 ENCOUNTER — OFFICE VISIT (OUTPATIENT)
Dept: FAMILY MEDICINE | Facility: CLINIC | Age: 67
End: 2017-12-15
Payer: COMMERCIAL

## 2017-12-15 VITALS
DIASTOLIC BLOOD PRESSURE: 78 MMHG | RESPIRATION RATE: 16 BRPM | OXYGEN SATURATION: 100 % | SYSTOLIC BLOOD PRESSURE: 130 MMHG | WEIGHT: 133.5 LBS | BODY MASS INDEX: 20.3 KG/M2 | TEMPERATURE: 98.6 F | HEART RATE: 74 BPM

## 2017-12-15 DIAGNOSIS — L72.3 SEBACEOUS CYST: Primary | ICD-10-CM

## 2017-12-15 PROCEDURE — 10060 I&D ABSCESS SIMPLE/SINGLE: CPT | Performed by: FAMILY MEDICINE

## 2017-12-15 RX ORDER — CEPHALEXIN 500 MG/1
500 CAPSULE ORAL 3 TIMES DAILY
Qty: 30 CAPSULE | Refills: 0 | Status: SHIPPED | OUTPATIENT
Start: 2017-12-15 | End: 2018-03-06

## 2017-12-15 NOTE — NURSING NOTE
"Chief Complaint   Patient presents with     Mass       Initial /78  Pulse 74  Temp 98.6  F (37  C) (Tympanic)  Resp 16  Wt 133 lb 8 oz (60.6 kg)  SpO2 100%  BMI 20.3 kg/m2 Estimated body mass index is 20.3 kg/(m^2) as calculated from the following:    Height as of 2/23/17: 5' 8\" (1.727 m).    Weight as of this encounter: 133 lb 8 oz (60.6 kg).  Medication Reconciliation: complete   Yenny Jackson CMA    "

## 2017-12-15 NOTE — PATIENT INSTRUCTIONS
(L72.3) Sebaceous cyst  (primary encounter diagnosis)  Comment: infected    Plan:  TREATMENT PROGNOSIS BENEFITS AND RISKS DISCUSSED     ANATOMIC SITE:  Right axill    PROCEDURE:  Incision and drainage     BETADYNE CLEANSING WITHOUT COMPLICATION     2% lidocaine        INJECTION WITH NO TOUCH TECHNIQUE    SIDE EFFECTS BENEFITS AND RISKS DISCUSSED      TREATMENT PROGNOSIS BENEFITS AND RISKS DISCUSSED     MONITOR FOR ALLERGIC AND VASCULAR SIDE EFFECTS    MEDICATION RISKS SIDE EFFECTS BENEFITS AND RISKS DISCUSSED     VEERTON TRIPLETT JR., MD     12/15/17            (L72.3) Sebaceous cyst  (primary encounter diagnosis)  Comment: infected  Plan: cephALEXin (KEFLEX) 500 MG capsule          Three times daily  10 days

## 2017-12-15 NOTE — PROGRESS NOTES
SUBJECTIVE:   Lobo Isaacs is a 67 year old male who presents to clinic today for the following health issues:      Cyst under right arm      Duration: over 1 year,     Description (location/character/radiation): arm pit right arm    Intensity:  moderate    Accompanying signs and symptoms: hard lump, itches, getting bigger and more bothersome    History (similar episodes/previous evaluation): was told to keep an eye on it    Precipitating or alleviating factors: None    Therapies tried and outcome: hot packs    INFECTED RIGHT AXILLARY CYST    DRAINAGE AND PAIN NOTED    ITCHING SENSATION AS WELL     RED HOT AND TENDER     NEEDING TO BE DRAINED     NOT POINTING YET     BUT VERY RED AND ENLARGED     AND TENDER TO DEEP PALPATION       .  Current Outpatient Prescriptions   Medication Sig Dispense Refill     cephALEXin (KEFLEX) 500 MG capsule Take 1 capsule (500 mg) by mouth 3 times daily 30 capsule 0     metoprolol (TOPROL-XL) 50 MG 24 hr tablet Take 1 tablet (50 mg) by mouth daily 90 tablet 3     Omega-3 Fatty Acids (OMEGA-3 FISH OIL PO) Take by mouth daily          No Known Allergies    Immunization History   Administered Date(s) Administered     Influenza (High Dose) 3 valent vaccine 2015, 2016, 2017     Influenza (IIV3) PF 2012, 2013     Pneumococcal (PCV 13) 2015     Pneumococcal 23 valent 02/10/2014     TDAP Vaccine (Adacel) 2012         reports that he does not drink alcohol.      reports that he does not use illicit drugs.    family history includes CANCER (age of onset: 87) in his father; Cancer - colorectal (age of onset: 30) in his sister; HEART DISEASE in his mother.    indicated that his mother is . He indicated that his father is . He indicated that the status of his sister is unknown. He indicated that his maternal grandmother is . He indicated that his maternal grandfather is . He indicated that his paternal grandmother is  . He indicated that his paternal grandfather is .      has a past surgical history that includes Cystoscopy, fulgurate bleeders, evacuate clot(s), combined (N/A, 3/2/2015); Cystoscopy, cystogram, combined (N/A, 3/2/2015); Cystoscopy, transurethral resection (TUR) tumor bladder, combined (N/A, 3/2/2015); Cystoscopy, retrogrades, combined (3/2/2015); Cystoscopy, transurethral resection (TUR) tumor bladder, combined (N/A, 2015); Cystoscopy, retrogrades, combined (2015); Laparoscopic herniorrhaphy inguinal (Right, 2015); and Herniorrhaphy inguinal (Right, 2015).     reports that he currently engages in sexual activity and has had female partners.  .  Pediatric History   Patient Guardian Status     Not on file.     Other Topics Concern     Parent/Sibling W/ Cabg, Mi Or Angioplasty Before 65f 55m? No     Social History Narrative         reports that he has quit smoking. He has never used smokeless tobacco.    Medical, social, surgical, and family histories reviewed.    Labs reviewed in EPIC  Patient Active Problem List   Diagnosis     Microscopic hematuria     Hyperlipidemia with target LDL less than 130     Primary bladder papillary carcinoma (H)     Hypertension goal BP (blood pressure) < 140/80     Bladder cancer (H)     Bladder tumor     Abscess, bladder     Health Care Home     ACP (advance care planning)     Rotator cuff syndrome of left shoulder     Right foot pain     Primary osteoarthritis of right knee     Underweight     Bilateral sensorineural hearing loss     Senile cataract of left eye     Past Surgical History:   Procedure Laterality Date     CYSTOSCOPY, CYSTOGRAM, COMBINED N/A 3/2/2015    Procedure: COMBINED CYSTOSCOPY, CYSTOGRAM;  Surgeon: Cody Torres MD;  Location:  OR     CYSTOSCOPY, FULGURATE BLEEDERS, EVACUATE CLOT(S), COMBINED N/A 3/2/2015    Procedure: COMBINED CYSTOSCOPY, FULGURATE BLEEDERS, EVACUATE CLOT(S);  Surgeon: Cody Torres MD;   Location:  OR     CYSTOSCOPY, RETROGRADES, COMBINED  3/2/2015    Procedure: COMBINED CYSTOSCOPY, RETROGRADES;  Surgeon: Cody Torres MD;  Location:  OR     CYSTOSCOPY, RETROGRADES, COMBINED  2015    Procedure: COMBINED CYSTOSCOPY, RETROGRADES;  Surgeon: Cody Torres MD;  Location:  OR     CYSTOSCOPY, TRANSURETHRAL RESECTION (TUR) TUMOR BLADDER, COMBINED N/A 3/2/2015    Procedure: COMBINED CYSTOSCOPY, TRANSURETHRAL RESECTION (TUR) TUMOR BLADDER;  Surgeon: Cody Torres MD;  Location:  OR     CYSTOSCOPY, TRANSURETHRAL RESECTION (TUR) TUMOR BLADDER, COMBINED N/A 2015    Procedure: COMBINED CYSTOSCOPY, TRANSURETHRAL RESECTION (TUR) TUMOR BLADDER;  Surgeon: Cody Torres MD;  Location:  OR     HERNIORRHAPHY INGUINAL Right 2015    Procedure: HERNIORRHAPHY INGUINAL;  Surgeon: Levi Warren MD;  Location: [facility]     LAPAROSCOPIC HERNIORRHAPHY INGUINAL Right 2015    Procedure: LAPAROSCOPIC HERNIORRHAPHY INGUINAL;  Surgeon: Levi Warren MD;  Location: [facility]       Social History   Substance Use Topics     Smoking status: Former Smoker     Smokeless tobacco: Never Used     Alcohol use No     Family History   Problem Relation Age of Onset     HEART DISEASE Mother      CANCER Father 87     Lung cancer,  age 88     Cancer - colorectal Sister 30         No Known Allergies  Recent Labs   Lab Test  17   0935  10/06/16   0916  11/03/15   0930   05/28/15   0946  03/12/15   0606   LDL  178*   --   144*   --   107   --    HDL  78   --   70   --   67   --    TRIG  70   --   52   --   55   --    ALT   --   21   --    --   19  18   CR  0.85  0.93   --    < >  0.90  0.73   GFRESTIMATED  90  81   --    < >  84  >90  Non  GFR Calc     GFRESTBLACK  >90  >90   --    < >  >90  >90  African American GFR Calc     POTASSIUM  4.7  4.7   --    < >  4.6  3.9    < > = values in this interval not displayed.        BP Readings from Last 6  Encounters:   12/15/17 130/78   12/07/17 120/80   10/06/16 128/62   10/13/15 138/80   09/04/15 135/78   09/02/15 110/74       Wt Readings from Last 3 Encounters:   12/15/17 133 lb 8 oz (60.6 kg)   12/07/17 134 lb (60.8 kg)   02/23/17 133 lb (60.3 kg)         Positive symptoms or findings indicated by bold designation:     ROS: 10 point ROS neg other than the symptoms noted above in the HPI.except  has Microscopic hematuria; Hyperlipidemia with target LDL less than 130; Primary bladder papillary carcinoma (H); Hypertension goal BP (blood pressure) < 140/80; Bladder cancer (H); Bladder tumor; Abscess, bladder; Health Care Home; ACP (advance care planning); Rotator cuff syndrome of left shoulder; Right foot pain; Primary osteoarthritis of right knee; Underweight; Bilateral sensorineural hearing loss; and Senile cataract of left eye on his problem list.   Constitutional: The patient denied fatigue, fever, insomnia, night sweats, recent illness and weight loss.  WEIGHT STALBE      Eyes: The patient denied blindness, eye pain, eye tearing, photophobia, vision change and visual disturbance. OK       Ears/Nose/Throat/Neck: The patient denied dizziness, facial pain, hearing loss, nasal discharge, oral pain, otalgia, postnasal drip, sinus congestion, sore throat, tinnitus and voice change.       Cardiovascular: The patient denied arrhythmia, chest pain/pressure, claudication, edema, exercise intolerance, fatigue, orthopnea, palpitations and syncope.      Respiratory: The patient denied asthma, chest congestion, cough, dyspnea on exertion, dyspnea/shortness of breath, hemoptysis, pedal edema, pleuritic pain, productive sputum, snoring and wheezing.     Gastrointestinal: The patient denied abdominal pain, anorexia, constipation, diarrhea, dysphagia, gastroesophageal reflux, hematochezia, hemorrhoids, melena, nausea and vomiting .     Genitourinary/Nephrology: The patient denied breast complaint, dysuria, nocturia sexual  dysfunction, t, urinary frequency, urinary incontinence, urinary urgency    BENIGN PROSTATIC HYPERTROPHY AN HISTORY OF BLADDER CANCER     Musculoskeletal: The patient denied arthralgia(s), back pain, joint complaint, muscle weakness, myalgias, osteoporosis, sciatica, stiffness and swelling.  RIGHT ROTATOR CUFF SYNSDORME     Dermatoligic:: The patient denied acne, dermatitis, ecchymosis, itching, mole change, rash, skin cancer, skin lesion and sores. RIGHT AXILLARY INFECTED SEBACEOUS CYST    Neurologic: The patient denied dizziness, gait abnormality, headache, memory loss, mental status change, paresis, paresthesia, seizure, syncope, tremor and vision change.     Psychiatric: The patient denied anxiety, depression, disturbances of memory, drug abuse, insomnia, mood swings and relationship difficulties.      Endocrine: The patient denied , goiter, obesity, polyuria and thyroid disease.      Hematologic/Lymphatic: The patient denied abnormal bleeding and bruising, abnormal ecchymoses, anemia, lymph node enlargement/mass, petechiae and venous  Thrombosis.      Allergy/Immunology: The patient denied food allergy and  Allergic rhinitis or conjunctivitis.        PE:  /78  Pulse 74  Temp 98.6  F (37  C) (Tympanic)  Resp 16  Wt 133 lb 8 oz (60.6 kg)  SpO2 100%  BMI 20.3 kg/m2 Body mass index is 20.3 kg/(m^2).    Constitutional: general appearance, well nourished, well developed, in no acute distress, well developed, appears stated age, normal body habitus,  SLENDER HABITUS     Eyes:; The patient has normal eyelids sclerae and conjunctivae :      Ears/Nose/Throat: external ear, overall: normal appearance; external nose, overall: benign appearance, normal moujth gums and lips  The patient has:      Neck: thyroid, overall: normal size, normal consistency, nontender,      Respiratory:  palpation of chest, overall: normal excursion,        Integument: inspection of skin, no rash, lesions; and, palpation, no  induration, no tenderness. INFECTED RIGHT AXILLARY CYST     Neurologic mental status, overall: alert and oriented; gait, no ataxia, no unsteadiness; coordination, no tremors; cranial nerves, overall: normal motor, overall: normal bulk, tone.      Psychiatric: orientation/consciousness, overall: oriented to person, place and time; behavior/psychomotor activity, no tics, normal psychomotor activity; mood and affect, overall: normal mood and affect; appearance, overall: well-groomed, good eye contact; speech, overall: normal quality, no aphasia and normal quality, quantity, intact.      Diagnostic Test Results:  Results for orders placed or performed in visit on 12/07/17   Lipid panel reflex to direct LDL Fasting   Result Value Ref Range    Cholesterol 270 (H) <200 mg/dL    Triglycerides 70 <150 mg/dL    HDL Cholesterol 78 >39 mg/dL    LDL Cholesterol Calculated 178 (H) <100 mg/dL    Non HDL Cholesterol 192 (H) <130 mg/dL   Basic metabolic panel   Result Value Ref Range    Sodium 138 133 - 144 mmol/L    Potassium 4.7 3.4 - 5.3 mmol/L    Chloride 104 94 - 109 mmol/L    Carbon Dioxide 20 20 - 32 mmol/L    Anion Gap 14 3 - 14 mmol/L    Glucose 85 70 - 99 mg/dL    Urea Nitrogen 17 7 - 30 mg/dL    Creatinine 0.85 0.66 - 1.25 mg/dL    GFR Estimate 90 >60 mL/min/1.7m2    GFR Estimate If Black >90 >60 mL/min/1.7m2    Calcium 8.6 8.5 - 10.1 mg/dL   Prostate spec antigen screen   Result Value Ref Range    PSA 0.40 0 - 4 ug/L   CBC with platelets   Result Value Ref Range    WBC 6.5 4.0 - 11.0 10e9/L    RBC Count 4.68 4.4 - 5.9 10e12/L    Hemoglobin 14.2 13.3 - 17.7 g/dL    Hematocrit 42.7 40.0 - 53.0 %    MCV 91 78 - 100 fl    MCH 30.3 26.5 - 33.0 pg    MCHC 33.3 31.5 - 36.5 g/dL    RDW 13.1 10.0 - 15.0 %    Platelet Count 224 150 - 450 10e9/L   UA reflex to Microscopic and Culture   Result Value Ref Range    Color Urine Yellow     Appearance Urine Clear     Glucose Urine Negative NEG^Negative mg/dL    Bilirubin Urine Negative  NEG^Negative    Ketones Urine Negative NEG^Negative mg/dL    Specific Gravity Urine 1.010 1.003 - 1.035    Blood Urine Small (A) NEG^Negative    pH Urine 6.0 5.0 - 7.0 pH    Protein Albumin Urine Negative NEG^Negative mg/dL    Urobilinogen Urine 0.2 0.2 - 1.0 EU/dL    Nitrite Urine Negative NEG^Negative    Leukocyte Esterase Urine Negative NEG^Negative    Source Midstream Urine    Urine Microscopic   Result Value Ref Range    WBC Urine O - 2 OTO2^O - 2 /HPF    RBC Urine O - 2 OTO2^O - 2 /HPF         ICD-10-CM    1. Sebaceous cyst L72.3 cephALEXin (KEFLEX) 500 MG capsule    infected        .    Side effects benefits and risks thoroughly discussed. .he may come in early if unimproved or getting worse          Importance of adhering to regimen discussed and if medications were dispensed, the importance of taking medications discussed and bringing in the medications after every visit for chronic problems         Please drink 2 glasses of water prior to meals and walk 15-30 minutes after meals    I spent  25 MINUTES SPENT  with patient discussing the following issues   The encounter diagnosis was Sebaceous cyst. over half of which involved counseling and coordination of care.    Patient Instructions   (L72.3) Sebaceous cyst  (primary encounter diagnosis)  Comment: infected    Plan:  TREATMENT PROGNOSIS BENEFITS AND RISKS DISCUSSED     ANATOMIC SITE:  Right axill    PROCEDURE:  Incision and drainage     BETADYNE CLEANSING WITHOUT COMPLICATION     2% lidocaine        INJECTION WITH NO TOUCH TECHNIQUE    SIDE EFFECTS BENEFITS AND RISKS DISCUSSED      TREATMENT PROGNOSIS BENEFITS AND RISKS DISCUSSED     MONITOR FOR ALLERGIC AND VASCULAR SIDE EFFECTS    MEDICATION RISKS SIDE EFFECTS BENEFITS AND RISKS DISCUSSED     EVERTON TRIPLETT JR., MD     12/15/17            (L72.3) Sebaceous cyst  (primary encounter diagnosis)  Comment: infected  Plan: cephALEXin (KEFLEX) 500 MG capsule          Three times daily  10 days               ALL THE ABOVE PROBLEMS ARE STABLE AND MED CHANGES AS NOTED    Diet:  MEDITERRANEAN DIET     Exercise:  AEROBIC WHEN BETTER REST X 48 HOURS   Exercises Range of motion, balance, isometric, and strengthening exercises 30 repetitions twice daily of involved joints      .EVERTON TRIPLETT MD 12/15/2017 11:44 AM  December 16, 2017        Subjective: Lobo Isaacs a 67 year old male who presents today for lesion removal. The lesion(s) is/are located on the RIGHT AXILLA, number 1 and measures 2cm He The patient reports the lesion is itching and painfull and denies other significant symptoms on ROS. Medications reviewed.    Pause for the cause has been completed prior to the prceedure.   17. Lobo was identified by both name and date of birth   18. The correct site was identified   19. Site was marked by provider    20. Written informed consent correct and signed or verbal authorization  to proceed was obtained   21. Verifed necessary supplies, equipment, and diagnostics are available    22. Time out was performed immediately prior to procedure    Objective: The lesion(s) is/are of the above mentioned size and location and is/are typical. The area was   and appropriately anesthetized. Using the usual technique, PUNCH BIOPSY AND DRAINAGE was performed. An appropriate dressing was applied. The procedure was well tolerated and without complications.     Assessment: INFECTED RIGHT AXILLARY SEBACEOUS CYST    Plan: Follow up: The patient may return in 1 weeks or sooner if needed.. Wound care instructions provided.  Patient was instructed to be alert for any signs of cutaneous infection.

## 2017-12-15 NOTE — MR AVS SNAPSHOT
After Visit Summary   12/15/2017    Lobo Isaacs    MRN: [id number]           Patient Information     Date Of Birth          1950        Visit Information        Provider Department      12/15/2017 4:30 PM Everton Crockett MD [facility]        Today's Diagnoses     Sebaceous cyst    -  1      Care Instructions    (L72.3) Sebaceous cyst  (primary encounter diagnosis)  Comment: infected    Plan:  TREATMENT PROGNOSIS BENEFITS AND RISKS DISCUSSED     ANATOMIC SITE:  Right axill    PROCEDURE:  Incision and drainage     BETADYNE CLEANSING WITHOUT COMPLICATION     2% lidocaine        INJECTION WITH NO TOUCH TECHNIQUE    SIDE EFFECTS BENEFITS AND RISKS DISCUSSED      TREATMENT PROGNOSIS BENEFITS AND RISKS DISCUSSED     MONITOR FOR ALLERGIC AND VASCULAR SIDE EFFECTS    MEDICATION RISKS SIDE EFFECTS BENEFITS AND RISKS DISCUSSED     EVERTON CROCKETT JR., MD     12/15/17            (L72.3) Sebaceous cyst  (primary encounter diagnosis)  Comment: infected  Plan: cephALEXin (KEFLEX) 500 MG capsule          Three times daily  10 days                Follow-ups after your visit        Who to contact     If you have questions or need follow up information about today's clinic visit or your schedule please contact [facility] directly at [phone].  Normal or non-critical lab and imaging results will be communicated to you by MyChart, letter or phone within 4 business days after the clinic has received the results. If you do not hear from us within 7 days, please contact the clinic through MyChart or phone. If you have a critical or abnormal lab result, we will notify you by phone as soon as possible.  Submit refill requests through [employer] or call your pharmacy and they will forward the refill request to us. Please allow 3 business days for your refill to be completed.          Additional Information About Your Visit        MyChart  "Information     [employer] [employer] lets you send messages to your doctor, view your test results, renew your prescriptions, schedule appointments and more. To sign up, go to [email or web address] . Click on \"Log in\" on the left side of the screen, which will take you to the Welcome page. Then click on \"Sign up Now\" on the right side of the page.     You will be asked to enter the access code listed below, as well as some personal information. Please follow the directions to create your username and password.     Your access code is: [email or web address]  Expires: 3/7/2018  9:35 AM     Your access code will  in 90 days. If you need help or a new code, please call your [facility] clinic or [phone].        Care EveryWhere ID     This is your Care EveryWhere ID. This could be used by other organizations to access your [facility] medical records  [id number]        Your Vitals Were     Pulse Temperature Respirations Pulse Oximetry BMI (Body Mass Index)       74 98.6  F (37  C) (Tympanic) 16 100% 20.3 kg/m2        Blood Pressure from Last 3 Encounters:   12/15/17 130/78   17 120/80   10/06/16 128/62    Weight from Last 3 Encounters:   12/15/17 133 lb 8 oz (60.6 kg)   17 134 lb (60.8 kg)   17 133 lb (60.3 kg)              Today, you had the following     No orders found for display         Today's Medication Changes          These changes are accurate as of: 12/15/17  5:21 PM.  If you have any questions, ask your nurse or doctor.               Start taking these medicines.        Dose/Directions    cephALEXin 500 MG capsule   Commonly known as:  KEFLEX   Used for:  Sebaceous cyst   Started by:  Efra Crockett MD        Dose:  500 mg   Take 1 capsule (500 mg) by mouth 3 times daily   Quantity:  30 capsule   Refills:  0            Where to get your medicines      These medications were sent to [facility] [address] [place] [id number]    "  Phone:  [phone]     cephALEXin 500 MG capsule                Primary Care Provider Office Phone # Fax #    Efra Jasmina Crockett [phone] [address]  [facility] [address]        Equal Access to Services     NAS BARRON : Hadii aad ku hadasho Soomaali, waaxda luqadaha, qaybta kaalmada adeegyada, waxay latricein thangn adeeg tylorabigail lapriscilla melton. So [facility] [phone].    ATENCIÓN: Si habla español, tiene a diaz disposición servicios gratuitos de asistencia lingüística. Llame al [phone].    We comply with applicable federal civil rights laws and [place] laws. We do not discriminate on the basis of race, color, national origin, age, disability, sex, sexual orientation, or gender identity.            Thank you!     Thank you for choosing [facility]  for your care. Our goal is always to provide you with excellent care. Hearing back from our patients is one way we can continue to improve our services. Please take a few minutes to complete the written survey that you may receive in the mail after your visit with us. Thank you!             Your Updated Medication List - Protect others around you: Learn how to safely use, store and throw away your medicines at www.disposemymeds.org.          This list is accurate as of: 12/15/17  5:21 PM.  Always use your most recent med list.                   Brand Name Dispense Instructions for use Diagnosis    cephALEXin 500 MG capsule    KEFLEX    30 capsule    Take 1 capsule (500 mg) by mouth 3 times daily    Sebaceous cyst       metoprolol 50 MG 24 hr tablet    TOPROL-XL    90 tablet    Take 1 tablet (50 mg) by mouth daily    Hypertension goal BP (blood pressure) < 140/80       OMEGA-3 FISH OIL PO      Take by mouth daily

## 2018-01-11 DIAGNOSIS — I10 HYPERTENSION GOAL BP (BLOOD PRESSURE) < 140/80: ICD-10-CM

## 2018-01-11 NOTE — TELEPHONE ENCOUNTER
Reason for Call:  Medication or medication refill:    Do you use a [facility]?  Name of the pharmacy and phone number for the current request:  [facility]    Name of the medication requested: metoprolol (TOPROL-XL) 50 MG 24 hr tablet    Other request:     Can we leave a detailed message on this number? YES    Phone number patient can be reached at: Home number on file [phone] (home)    Best Time:     Call taken on 1/11/2018 at 10:45 AM by MONA AU

## 2018-01-12 RX ORDER — METOPROLOL SUCCINATE 50 MG/1
50 TABLET, EXTENDED RELEASE ORAL DAILY
Qty: 90 TABLET | Refills: 3 | Status: SHIPPED | OUTPATIENT
Start: 2018-01-12 | End: 2019-02-15

## 2018-01-12 NOTE — TELEPHONE ENCOUNTER
"Requested Prescriptions   Pending Prescriptions Disp Refills     metoprolol succinate (TOPROL-XL) 50 MG 24 hr tablet  Last Written Prescription Date:  12/30/2016  Last Fill Quantity: 90,  # refills: 3   Last Office Visit  12/15/2017        with  [facility], [facility] or [facility] prescribing provider:     Future Office Visit:        90 tablet 3     Sig: Take 1 tablet (50 mg) by mouth daily    Beta-Blockers Protocol Passed    1/11/2018 10:46 AM       Passed - Blood pressure under 140/90    BP Readings from Last 3 Encounters:   12/15/17 130/78   12/07/17 120/80   10/06/16 128/62                Passed - Patient is age 6 or older       Passed - Recent or future visit with authorizing provider's specialty    Patient had office visit in the last year or has a visit in the next 30 days with authorizing provider.  See \"Patient Info\" tab in inbasket, or \"Choose Columns\" in Meds & Orders section of the refill encounter.               Prescription approved per [facility] Refill Protocol.    "

## 2018-02-21 DIAGNOSIS — C67.9 MALIGNANT NEOPLASM OF URINARY BLADDER, UNSPECIFIED SITE (H): Primary | ICD-10-CM

## 2018-02-22 ENCOUNTER — OFFICE VISIT (OUTPATIENT)
Dept: UROLOGY | Facility: CLINIC | Age: 68
End: 2018-02-22
Payer: COMMERCIAL

## 2018-02-22 VITALS
HEIGHT: 68 IN | BODY MASS INDEX: 19.7 KG/M2 | HEART RATE: 70 BPM | SYSTOLIC BLOOD PRESSURE: 138 MMHG | DIASTOLIC BLOOD PRESSURE: 80 MMHG | WEIGHT: 130 LBS | OXYGEN SATURATION: 98 %

## 2018-02-22 DIAGNOSIS — Z85.51 PERSONAL HISTORY OF MALIGNANT NEOPLASM OF BLADDER: Primary | ICD-10-CM

## 2018-02-22 DIAGNOSIS — C67.9 MALIGNANT NEOPLASM OF URINARY BLADDER, UNSPECIFIED SITE (H): Primary | ICD-10-CM

## 2018-02-22 LAB
ALBUMIN UR-MCNC: ABNORMAL MG/DL
APPEARANCE UR: CLEAR
BILIRUB UR QL STRIP: NEGATIVE
COLOR UR AUTO: YELLOW
GLUCOSE UR STRIP-MCNC: NEGATIVE MG/DL
HGB UR QL STRIP: ABNORMAL
KETONES UR STRIP-MCNC: NEGATIVE MG/DL
LEUKOCYTE ESTERASE UR QL STRIP: NEGATIVE
NITRATE UR QL: NEGATIVE
PH UR STRIP: 5 PH (ref 5–7)
SOURCE: ABNORMAL
SP GR UR STRIP: 1.02 (ref 1–1.03)
UROBILINOGEN UR STRIP-ACNC: 0.2 EU/DL (ref 0.2–1)

## 2018-02-22 PROCEDURE — 81003 URINALYSIS AUTO W/O SCOPE: CPT | Performed by: UROLOGY

## 2018-02-22 PROCEDURE — 52000 CYSTOURETHROSCOPY: CPT | Performed by: UROLOGY

## 2018-02-22 PROCEDURE — 00000103 ZZHCL STATISTIC CYTO-FISH QC 88120: Performed by: UROLOGY

## 2018-02-22 PROCEDURE — 99213 OFFICE O/P EST LOW 20 MIN: CPT | Mod: 25 | Performed by: UROLOGY

## 2018-02-22 RX ORDER — CIPROFLOXACIN 500 MG/1
500 TABLET, FILM COATED ORAL ONCE
Qty: 1 TABLET | Refills: 0 | Status: SHIPPED | OUTPATIENT
Start: 2018-02-22 | End: 2019-02-19

## 2018-02-22 ASSESSMENT — PAIN SCALES - GENERAL: PAINLEVEL: NO PAIN (0)

## 2018-02-22 NOTE — MR AVS SNAPSHOT
"              After Visit Summary   2/22/2018    Lobo Isaacs    MRN: [id number]           Patient Information     Date Of Birth          1950        Visit Information        Provider Department      2/22/2018 2:00 PM Cody Torres MD; [facility]        Today's Diagnoses     Malignant neoplasm of urinary bladder, unspecified site (H)    -  1      Care Instructions         AFTER YOUR CYSTOSCOPY         You have just completed a cystoscopy, or \"cysto\", which allowed your physician to learn more about your bladder (or to remove a stent placed after surgery). We suggest that you continue to avoid caffeine, fruit juice, and alcohol for the next 24 hours, however, you are encouraged to return to your normal activities.       A few things that are considered normal after your cystoscopy:    * small amount of bleeding (or spotting) that clears within the next 24 hours    * slight burning sensation with urination    * sensation to of needing to avoid more frequently    * the feeling of \"air\" in your urine    * mild discomfort that is relieved with Tylonol        Please contact our office promptly if you:    * develop a fever above 101 degrees    * are unable to urinate    * develop bright red blood that does not stop    * severe pain or swelling        And of course, please contact our office with any concerns or questions [phone]          AFTER YOUR CYSTOSCOPY        You have just completed a cystoscopy, or \"cysto\", which allowed your physician to learn more about your bladder (or to remove a stent placed after surgery). We suggest that you continue to avoid caffeine, fruit juice, and alcohol for the next 24 hours, however, you are encouraged to return to your normal activities.         A few things that are considered normal after your cystoscopy:     * Small amount of bleeding (or spotting) that clears within the next 24 hours     * Slight burning " "sensation with urination     * Sensation to of needing to avoid more frequently     * The feeling of \"air\" in your urine     * Mild discomfort that is relieved with Tylenol        Please contact our office promptly if you:     * Develop a fever above 101 degrees     * Are unable to urinate     * Develop bright red blood that does not stop     * Severe pain or swelling         Please contact our office with any concerns or questions [facility]          Follow-ups after your visit        Follow-up notes from your care team     Return in 12 months (on 2/22/2019).      Future tests that were ordered for you today     Open Future Orders        Priority Expected Expires Ordered    CT Abdomen Pelvis w/o & w Contrast [XRA870] Routine  2/22/2019 2/22/2018    UA without Microscopic Routine  2/21/2019 2/21/2018    FISH BLADDER CANCER Routine  2/21/2019 2/21/2018            Who to contact     If you have questions or need follow up information about today's clinic visit or your schedule please contact [facility] [place] directly at [phone].  Normal or non-critical lab and imaging results will be communicated to you by Revelenshart, letter or phone within 4 business days after the clinic has received the results. If you do not hear from us within 7 days, please contact the clinic through Paywardt or phone. If you have a critical or abnormal lab result, we will notify you by phone as soon as possible.  Submit refill requests through Knozen or call your pharmacy and they will forward the refill request to us. Please allow 3 business days for your refill to be completed.          Additional Information About Your Visit        Knozen Information     Knozen lets you send messages to your doctor, view your test results, renew your prescriptions, schedule appointments and more. To sign up, go to [email or web address] . Click on \"Log in\" on the left side of the screen, which will take you to the " "Welcome page. Then click on \"Sign up Now\" on the right side of the page.     You will be asked to enter the access code listed below, as well as some personal information. Please follow the directions to create your username and password.     Your access code is: [email or web address]  Expires: 3/7/2018  9:35 AM     Your access code will  in 90 days. If you need help or a new code, please call your [facility] clinic or [phone].        Care EveryWhere ID     This is your Care EveryWhere ID. This could be used by other organizations to access your [facility] medical records  [id number]        Your Vitals Were     Pulse Height Pulse Oximetry BMI (Body Mass Index)          70 1.727 m (5' 8\") 98% 19.77 kg/m2         Blood Pressure from Last 3 Encounters:   18 138/80   12/15/17 130/78   17 120/80    Weight from Last 3 Encounters:   18 59 kg (130 lb)   12/15/17 60.6 kg (133 lb 8 oz)   17 60.8 kg (134 lb)              We Performed the Following     CYSTOURETHROSCOPY     FISH BLADDER CANCER     UA without Microscopic          Today's Medication Changes          These changes are accurate as of 18  2:35 PM.  If you have any questions, ask your nurse or doctor.               Start taking these medicines.        Dose/Directions    ciprofloxacin 500 MG tablet   Commonly known as:  CIPRO   Used for:  Malignant neoplasm of urinary bladder, unspecified site (H)   Started by:  Cody Torres MD        Dose:  500 mg   Take 1 tablet (500 mg) by mouth once for 1 dose   Quantity:  1 tablet   Refills:  0            Where to get your medicines      These medications were sent to [facility] - [address]  [address] [facility] [address]     Phone:  [phone]     ciprofloxacin 500 MG tablet                Primary Care Provider Office Phone # Fax #    Efra Jasmina Crockett [phone] [address]  [facility] [address]      "   Equal Access to Services     [facility] : Hadii aad ku hadawltandreea Brittnikaya, waarleneda luqadaha, qakusumta aramisharshpurnima park. So [facility] [phone].    ATENCIÓN: Si habla español, tiene a diaz disposición servicios gratuitos de asistencia lingüística. Daphne al [phone].    We comply with applicable federal civil rights laws and [place] laws. We do not discriminate on the basis of race, color, national origin, age, disability, sex, sexual orientation, or gender identity.            Thank you!     Thank you for choosing [facility] [place]  for your care. Our goal is always to provide you with excellent care. Hearing back from our patients is one way we can continue to improve our services. Please take a few minutes to complete the written survey that you may receive in the mail after your visit with us. Thank you!             Your Updated Medication List - Protect others around you: Learn how to safely use, store and throw away your medicines at www.disposemymeds.org.          This list is accurate as of 2/22/18  2:35 PM.  Always use your most recent med list.                   Brand Name Dispense Instructions for use Diagnosis    cephALEXin 500 MG capsule    KEFLEX    30 capsule    Take 1 capsule (500 mg) by mouth 3 times daily    Sebaceous cyst       ciprofloxacin 500 MG tablet    CIPRO    1 tablet    Take 1 tablet (500 mg) by mouth once for 1 dose    Malignant neoplasm of urinary bladder, unspecified site (H)       * metoprolol succinate 50 MG 24 hr tablet    TOPROL-XL    90 tablet    Take 1 tablet (50 mg) by mouth daily    Hypertension goal BP (blood pressure) < 140/80       * metoprolol succinate 50 MG 24 hr tablet    TOPROL-XL    90 tablet    TAKE ONE TABLET BY MOUTH ONCE DAILY    Hypertension goal BP (blood pressure) < 140/80       OMEGA-3 FISH OIL PO      Take by mouth daily        * Notice:  This list has 2 medication(s) that are the  same as other medications prescribed for you. Read the directions carefully, and ask your doctor or other care provider to review them with you.

## 2018-02-22 NOTE — LETTER
2/22/2018       RE: Lobo Isaacs  [address]  [facility] [address]     Dear Colleague,    Thank you for referring your patient, Lobo Isaacs, to the [facility] [place] at [facility]. Please see a copy of my visit note below.    It is a great pleasure to see this very pleasant 67-year-old gentleman in follow-up consultation today for check cystoscopy.  We recall he had presented with gross hematuria in 2015 and very extensive involvement of the bladder by papillary tumors which fortunately were low-grade although we had to do very extensive transurethral section.  Initially he did have an extraperitoneal leak in the bladder so he was catheterized for a prolonged period until a cystogram showed no evidence of residual leak of urine since then has done very well with no evidence of recurrence of disease.     Procedure.  Cystoscopy.   Surgeon.    Melissa  Anesthesia.  Local anesthesia.  Description.  With the patient in the supine position, with the genital area prepped and draped in the customary fashion, with local anesthetic and the urethra, the flexible cystoscope was Inserted.   The penile urethra is normal.  The external sphincter is intact.  The prostatic urethra shows mild lateral lobe hyperplasia.  There is no significant median lobe.  The interior of the bladder is carefully inspected.  There is grade 1 trabeculation.  There is no evidence of stone formation.  There is at least a 2 cm neoplasm just lateral and below the right ureteric orifice which appears papillary in nature with a few surface encrustations.  There are some small inflammatory areas seen towards the dome of the bladder.  There were no other remarkable features.    Impression.  I discussed the situation carefully with the patient and his wife.  Clearly this will need to be resected and other areas within the bladder biopsy.  I suspect this is low-grade  "noninvasive disease which can be treated very easily.  We will likely need to discuss whether we need to consider additional intravesical therapy once we have the pathology report.  I had a very careful discussion with the patient and his wife today about these findings, reassuring him that this is likely also low-grade and noninvasive but not unexpected and the reason why we do conduct such careful surveillance.  I also talked to them about potential alternatives for treatment which will be based upon the pathology report.    I did discuss the entire situation with the patient and his wife in detail today.  I answered all their questions    Plan.  Cystoscopy with bilateral retrograde pyelograms and transurethral resection of bladder tumors with biopsies.    Time.  15 minutes was spent in addition to the procedure note without careful discussion about the findings, talked about the nature of superficial bladder cancer, likely pathological nature of this tumor, the proposed treatment options with side effects etc. and alternative treatment strategies that may be necessary in the future      \"This dictation was performed with voice recognition software and may contain errors,  omissions and inadvertent word substitution.\"      Again, thank you for allowing me to participate in the care of your patient.      Sincerely,    Cody Torres MD      "

## 2018-02-22 NOTE — PATIENT INSTRUCTIONS

## 2018-02-22 NOTE — PROGRESS NOTES
It is a great pleasure to see this very pleasant 67-year-old gentleman in follow-up consultation today for check cystoscopy.  We recall he had presented with gross hematuria in 2015 and very extensive involvement of the bladder by papillary tumors which fortunately were low-grade although we had to do very extensive transurethral section.  Initially he did have an extraperitoneal leak in the bladder so he was catheterized for a prolonged period until a cystogram showed no evidence of residual leak of urine since then has done very well with no evidence of recurrence of disease.     Procedure.  Cystoscopy.   Surgeon.    Melissa  Anesthesia.  Local anesthesia.  Description.  With the patient in the supine position, with the genital area prepped and draped in the customary fashion, with local anesthetic and the urethra, the flexible cystoscope was Inserted.   The penile urethra is normal.  The external sphincter is intact.  The prostatic urethra shows mild lateral lobe hyperplasia.  There is no significant median lobe.  The interior of the bladder is carefully inspected.  There is grade 1 trabeculation.  There is no evidence of stone formation.  There is at least a 2 cm neoplasm just lateral and below the right ureteric orifice which appears papillary in nature with a few surface encrustations.  There are some small inflammatory areas seen towards the dome of the bladder.  There were no other remarkable features.    Impression.  I discussed the situation carefully with the patient and his wife.  Clearly this will need to be resected and other areas within the bladder biopsy.  I suspect this is low-grade noninvasive disease which can be treated very easily.  We will likely need to discuss whether we need to consider additional intravesical therapy once we have the pathology report.  I had a very careful discussion with the patient and his wife today about these findings, reassuring him that this is likely also low-grade  "and noninvasive but not unexpected and the reason why we do conduct such careful surveillance.  I also talked to them about potential alternatives for treatment which will be based upon the pathology report.    I did discuss the entire situation with the patient and his wife in detail today.  I answered all their questions    Plan.  Cystoscopy with bilateral retrograde pyelograms and transurethral resection of bladder tumors with biopsies.    Time.  15 minutes was spent in addition to the procedure note without careful discussion about the findings, talked about the nature of superficial bladder cancer, likely pathological nature of this tumor, the proposed treatment options with side effects etc. and alternative treatment strategies that may be necessary in the future      \"This dictation was performed with voice recognition software and may contain errors,  omissions and inadvertent word substitution.\"    "

## 2018-02-22 NOTE — NURSING NOTE

## 2018-03-02 LAB — COPATH REPORT: NORMAL

## 2018-03-05 ENCOUNTER — HOSPITAL ENCOUNTER (OUTPATIENT)
Dept: CT IMAGING | Facility: CLINIC | Age: 68
Discharge: HOME | End: 2018-03-05
Attending: UROLOGY | Admitting: UROLOGY
Payer: MEDICARE

## 2018-03-05 DIAGNOSIS — C67.9 MALIGNANT NEOPLASM OF URINARY BLADDER, UNSPECIFIED SITE (H): ICD-10-CM

## 2018-03-05 PROCEDURE — 25000128 H RX IP 250 OP 636: Performed by: UROLOGY

## 2018-03-05 PROCEDURE — 25000125 ZZHC RX 250: Performed by: UROLOGY

## 2018-03-05 PROCEDURE — 74178 CT ABD&PLV WO CNTR FLWD CNTR: CPT

## 2018-03-05 RX ORDER — IOPAMIDOL 755 MG/ML
100 INJECTION, SOLUTION INTRAVASCULAR ONCE
Status: COMPLETED | OUTPATIENT
Start: 2018-03-05 | End: 2018-03-05

## 2018-03-05 RX ADMIN — IOPAMIDOL 100 ML: 755 INJECTION, SOLUTION INTRAVENOUS at 14:30

## 2018-03-05 RX ADMIN — SODIUM CHLORIDE 60 ML: 9 INJECTION, SOLUTION INTRAVENOUS at 14:31

## 2018-03-06 ENCOUNTER — OFFICE VISIT (OUTPATIENT)
Dept: FAMILY MEDICINE | Facility: CLINIC | Age: 68
End: 2018-03-06
Payer: COMMERCIAL

## 2018-03-06 ENCOUNTER — RADIANT APPOINTMENT (OUTPATIENT)
Dept: GENERAL RADIOLOGY | Facility: CLINIC | Age: 68
End: 2018-03-06
Attending: FAMILY MEDICINE
Payer: COMMERCIAL

## 2018-03-06 VITALS
TEMPERATURE: 97.6 F | RESPIRATION RATE: 16 BRPM | BODY MASS INDEX: 20.75 KG/M2 | OXYGEN SATURATION: 99 % | SYSTOLIC BLOOD PRESSURE: 140 MMHG | WEIGHT: 136.5 LBS | DIASTOLIC BLOOD PRESSURE: 82 MMHG | HEART RATE: 66 BPM

## 2018-03-06 DIAGNOSIS — I10 HYPERTENSION GOAL BP (BLOOD PRESSURE) < 140/80: ICD-10-CM

## 2018-03-06 DIAGNOSIS — D49.4 BLADDER TUMOR: ICD-10-CM

## 2018-03-06 DIAGNOSIS — Z01.818 PREOP GENERAL PHYSICAL EXAM: ICD-10-CM

## 2018-03-06 DIAGNOSIS — Z01.818 PREOP GENERAL PHYSICAL EXAM: Primary | ICD-10-CM

## 2018-03-06 LAB
ANION GAP SERPL CALCULATED.3IONS-SCNC: 4 MMOL/L (ref 3–14)
BUN SERPL-MCNC: 15 MG/DL (ref 7–30)
CALCIUM SERPL-MCNC: 8.8 MG/DL (ref 8.5–10.1)
CHLORIDE SERPL-SCNC: 103 MMOL/L (ref 94–109)
CO2 SERPL-SCNC: 30 MMOL/L (ref 20–32)
CREAT SERPL-MCNC: 0.92 MG/DL (ref 0.66–1.25)
GFR SERPL CREATININE-BSD FRML MDRD: 82 ML/MIN/1.7M2
GLUCOSE SERPL-MCNC: 99 MG/DL (ref 70–99)
POTASSIUM SERPL-SCNC: 4.6 MMOL/L (ref 3.4–5.3)
SODIUM SERPL-SCNC: 137 MMOL/L (ref 133–144)

## 2018-03-06 PROCEDURE — 71046 X-RAY EXAM CHEST 2 VIEWS: CPT | Mod: FY

## 2018-03-06 PROCEDURE — 80048 BASIC METABOLIC PNL TOTAL CA: CPT | Performed by: FAMILY MEDICINE

## 2018-03-06 PROCEDURE — 99214 OFFICE O/P EST MOD 30 MIN: CPT | Mod: 25 | Performed by: FAMILY MEDICINE

## 2018-03-06 PROCEDURE — 36415 COLL VENOUS BLD VENIPUNCTURE: CPT | Performed by: FAMILY MEDICINE

## 2018-03-06 PROCEDURE — 93000 ELECTROCARDIOGRAM COMPLETE: CPT | Performed by: FAMILY MEDICINE

## 2018-03-06 NOTE — MR AVS SNAPSHOT
After Visit Summary   3/6/2018    Lobo Isaacs    MRN: [id number]           Patient Information     Date Of Birth          1950        Visit Information        Provider Department      3/6/2018 7:30 AM Efra Crockett MD [facility]        Today's Diagnoses     Preop general physical exam    -  1    Bladder tumor        Hypertension goal BP (blood pressure) < 140/80          Care Instructions      Before Your Surgery      Call your surgeon if there is any change in your health. This includes signs of a cold or flu (such as a sore throat, runny nose, cough, rash or fever).    Do not smoke, drink alcohol or take over the counter medicine (unless your surgeon or primary care doctor tells you to) for the 24 hours before and after surgery.    If you take prescribed drugs: Follow your doctor s orders about which medicines to take and which to stop until after surgery.    Eating and drinking prior to surgery: follow the instructions from your surgeon    Take a shower or bath the night before surgery. Use the soap your surgeon gave you to gently clean your skin. If you do not have soap from your surgeon, use your regular soap. Do not shave or scrub the surgery site.  Wear clean pajamas and have clean sheets on your bed.           Follow-ups after your visit        Your next 10 appointments already scheduled     Mar 08, 2018   Procedure with Cody Torres MD   [facility] (--)    [address]  [facility] [address]   [phone]            Mar 15, 2018  1:40 PM CDT   Return Visit with Cody Torres MD   [facility] [place] ([facility])    [address]  [address]  [facility] [address]   [phone]              Future tests that were ordered for you today     Open Future Orders        Priority Expected Expires Ordered    **CBC with platelets FUTURE 14d  "Routine 3/13/2018 3/20/2018 3/6/2018    CT Abdomen Pelvis Hematuria w/o & w Contrast Routine  2019            Who to contact     If you have questions or need follow up information about today's clinic visit or your schedule please contact [facility] directly at [phone].  Normal or non-critical lab and imaging results will be communicated to you by MyChart, letter or phone within 4 business days after the clinic has received the results. If you do not hear from us within 7 days, please contact the clinic through Breezeworkshart or phone. If you have a critical or abnormal lab result, we will notify you by phone as soon as possible.  Submit refill requests through StatSocial or call your pharmacy and they will forward the refill request to us. Please allow 3 business days for your refill to be completed.          Additional Information About Your Visit        StatSocial Information     StatSocial lets you send messages to your doctor, view your test results, renew your prescriptions, schedule appointments and more. To sign up, go to [email or web address] . Click on \"Log in\" on the left side of the screen, which will take you to the Welcome page. Then click on \"Sign up Now\" on the right side of the page.     You will be asked to enter the access code listed below, as well as some personal information. Please follow the directions to create your username and password.     Your access code is: [email or web address]  Expires: 3/7/2018  9:35 AM     Your access code will  in 90 days. If you need help or a new code, please call your [facility] clinic or [phone].        Care EveryWhere ID     This is your Care EveryWhere ID. This could be used by other organizations to access your [facility] medical records  [id number]        Your Vitals Were     Pulse Temperature Respirations Pulse Oximetry BMI (Body Mass Index)       66 97.6  F (36.4  C) (Tympanic) 16 99% 20.75 kg/m2        Blood " Pressure from Last 3 Encounters:   03/06/18 140/82   02/22/18 138/80   12/15/17 130/78    Weight from Last 3 Encounters:   03/06/18 136 lb 8 oz (61.9 kg)   02/22/18 130 lb (59 kg)   12/15/17 133 lb 8 oz (60.6 kg)              We Performed the Following     Basic metabolic panel     EKG 12-lead complete w/read - Clinics        Primary Care Provider Office Phone # Fax #    Efra Jasmina Crockett [phone] [address] [facility] [address]        Equal Access to Services     [facility] : Hadii aad ku hadasho Soomaali, waaxda luqadaha, qaybta kaalmada adeegyada, waxjasmyne stubbs . So [facility] [phone].    ATENCIÓN: Si habla español, tiene a diaz disposición servicios gratuitos de asistencia lingüística. Llame al [phone].    We comply with applicable federal civil rights laws and [place] laws. We do not discriminate on the basis of race, color, national origin, age, disability, sex, sexual orientation, or gender identity.            Thank you!     Thank you for choosing [facility]  for your care. Our goal is always to provide you with excellent care. Hearing back from our patients is one way we can continue to improve our services. Please take a few minutes to complete the written survey that you may receive in the mail after your visit with us. Thank you!             Your Updated Medication List - Protect others around you: Learn how to safely use, store and throw away your medicines at www.disposemymeds.org.          This list is accurate as of 3/6/18  9:57 AM.  Always use your most recent med list.                   Brand Name Dispense Instructions for use Diagnosis    metoprolol succinate 50 MG 24 hr tablet    TOPROL-XL    90 tablet    Take 1 tablet (50 mg) by mouth daily    Hypertension goal BP (blood pressure) < 140/80       OMEGA-3 FISH OIL PO      Take by mouth daily

## 2018-03-06 NOTE — H&P (VIEW-ONLY)
[address]  [address]  [facility] [address]  [phone]  Dept: [phone]    PRE-OP EVALUATION:  Today's date: 3/6/2018    Lobo Isaacs (: 1950) presents for pre-operative evaluation assessment as requested by Dr. Torres.  He requires evaluation and anesthesia risk assessment prior to undergoing surgery/procedure for treatment of Bladder Surgery .    [phone]  Primary Physician: Efra Crockett  Type of Anesthesia Anticipated: to be determined    Patient has a Health Care Directive or Living Will:  YES     Preop Questions 3/6/2018   Who is doing your surgery? Dr Torres   What are you having done? Bladder surgery   Date of Surgery/Procedure: Mar 8 2018   Facility or Hospital where procedure/surgery will be performed: [facility]   1.  Do you have a history of Heart attack, stroke, stent, coronary bypass surgery, or other heart surgery? No   2.  Do you ever have any pain or discomfort in your chest? No   3.  Do you have a history of  Heart Failure? No   4.   Are you troubled by shortness of breath when:  walking on a level surface, or up a slight hill, or at night? No   5.  Do you currently have a cold, bronchitis or other respiratory infection? No   6.  Do you have a cough, shortness of breath, or wheezing? No   7.  Do you sometimes get pains in the calves of your legs when you walk? No   8. Do you or anyone in your family have previous history of blood clots? No   9.  Do you or does anyone in your family have a serious bleeding problem such as prolonged bleeding following surgeries or cuts? No   10. Have you ever had problems with anemia or been told to take iron pills? No   11. Have you had any abnormal blood loss such as black, tarry or bloody stools? No         HPI:     HPI related to upcoming procedure:  Recurrent bladder cancer   Residual blood on urine analysis   Filling defect in bladder   Renal pyramid changes  bilateral       HYPERTENSION - Patient has longstanding history of HTN , currently denies any symptoms referable to elevated blood pressure. Specifically denies chest pain, palpitations, dyspnea, orthopnea, PND or peripheral edema. Blood pressure readings have been in normal range. Current medication regimen is as listed below. Patient denies any side effects of medication.                                                                                                                                                                                          .  HYPERLIPIDEMIA - Patient has a long history of significant Hyperlipidemia requiring medication for treatment with recent good control. Patient reports no problems or side effects with the medication.                                                                                                                                                       .  SLEEP PROBLEM - Patient has a longstanding history of snoring, excessive daytime somnolence, fatigue and periodic leg movement of moderate severity. Patient has tried OTC medications with limited success.                                                                                                                                         .    MEDICAL HISTORY:     Patient Active Problem List    Diagnosis Date Noted     Right foot pain 10/06/2016     Priority: Medium     Primary osteoarthritis of right knee 10/06/2016     Priority: Medium     Underweight 10/06/2016     Priority: Medium     Bilateral sensorineural hearing loss 10/06/2016     Priority: Medium     Senile cataract of left eye 10/06/2016     Priority: Medium     Rotator cuff syndrome of left shoulder 08/11/2015     Priority: Medium     ACP (advance care planning) 07/23/2015     Priority: Medium     Advance Care Planning 7/23/2015: Receipt of ACP document:  Received: Health Care Directive which was witnessed or notarized on 7/6/15.  Document previously  scanned on 7/14/15.  Validation form completed and sent to be scanned.  Code Status reflects choices in most recent ACP document.  Confirmed/documented designated decision maker(s).  Added by [facility] 03/13/2015     Priority: Medium     Status:  Declined  Care Coordinator:  Fifi Burrows RN CCM    See Letters for [facility] Care Plan  Date:  March 13, 2015         Abscess, bladder 03/11/2015     Priority: Medium     Bladder tumor 03/03/2015     Priority: Medium     Bladder cancer (H) 03/02/2015     Priority: Medium     Primary bladder papillary carcinoma (H) 02/26/2015     Priority: Medium     Hypertension goal BP (blood pressure) < 140/80 02/26/2015     Priority: Medium     Hyperlipidemia with target LDL less than 130 02/11/2015     Priority: Medium     Diagnosis updated by automated process. Provider to review and confirm.       Microscopic hematuria 12/27/2012     Priority: Medium      Past Medical History:   Diagnosis Date     Anemia      Bladder cancer (H)      History of blood transfusion 3/2015    bladder surgery(2 Units)     Hyperlipidemia      Hypertension      Mumps      Past Surgical History:   Procedure Laterality Date     CYSTOSCOPY       CYSTOSCOPY, CYSTOGRAM, COMBINED N/A 3/2/2015    Procedure: COMBINED CYSTOSCOPY, CYSTOGRAM;  Surgeon: Cody Torres MD;  Location:  OR     CYSTOSCOPY, FULGURATE BLEEDERS, EVACUATE CLOT(S), COMBINED N/A 3/2/2015    Procedure: COMBINED CYSTOSCOPY, FULGURATE BLEEDERS, EVACUATE CLOT(S);  Surgeon: Cody Torres MD;  Location:  OR     CYSTOSCOPY, RETROGRADES, COMBINED  3/2/2015    Procedure: COMBINED CYSTOSCOPY, RETROGRADES;  Surgeon: Cody Torres MD;  Location:  OR     CYSTOSCOPY, RETROGRADES, COMBINED  7/7/2015    Procedure: COMBINED CYSTOSCOPY, RETROGRADES;  Surgeon: Cody Torres MD;  Location:  OR     CYSTOSCOPY, TRANSURETHRAL RESECTION (TUR) TUMOR BLADDER, COMBINED N/A 3/2/2015    Procedure: COMBINED  CYSTOSCOPY, TRANSURETHRAL RESECTION (TUR) TUMOR BLADDER;  Surgeon: Cody Torres MD;  Location:  OR     CYSTOSCOPY, TRANSURETHRAL RESECTION (TUR) TUMOR BLADDER, COMBINED N/A 7/7/2015    Procedure: COMBINED CYSTOSCOPY, TRANSURETHRAL RESECTION (TUR) TUMOR BLADDER;  Surgeon: Cody Torres MD;  Location:  OR     HERNIORRHAPHY INGUINAL Right 9/4/2015    Procedure: HERNIORRHAPHY INGUINAL;  Surgeon: Levi Warren MD;  Location:  [facility]     LAPAROSCOPIC HERNIORRHAPHY INGUINAL Right 9/4/2015    Procedure: LAPAROSCOPIC HERNIORRHAPHY INGUINAL;  Surgeon: Levi Warren MD;  Location: [facility]     Current Outpatient Prescriptions   Medication Sig Dispense Refill     metoprolol succinate (TOPROL-XL) 50 MG 24 hr tablet Take 1 tablet (50 mg) by mouth daily 90 tablet 3     [DISCONTINUED] metoprolol succinate (TOPROL-XL) 50 MG 24 hr tablet TAKE ONE TABLET BY MOUTH ONCE DAILY (Patient not taking: Reported on 3/6/2018) 90 tablet 3     Omega-3 Fatty Acids (OMEGA-3 FISH OIL PO) Take by mouth daily       OTC products: no recent use of OTC ASA, NSAIDS or Steroids    No Known Allergies   Latex Allergy: NO    Social History   Substance Use Topics     Smoking status: Former Smoker     Smokeless tobacco: Never Used     Alcohol use No     History   Drug Use No     CT ABDOMEN/PELVIS HEMATURIA WITH AND WITHOUT IV CONTRAST March 5, 2018  2:58 PM      HISTORY: Malignant neoplasm of urinary bladder, unspecified site (H).     TECHNIQUE: 100mL Isovue-370. Radiation dose for this scan was reduced  using automated exposure control, adjustment of the mA and/or kV  according to patient size, or iterative reconstruction technique.     COMPARISON: 3/10/2015.     FINDINGS: There is slight high density of the renal pyramids  bilaterally, likely a variant of normal. No urinary tract calculi are  demonstrated. There is symmetric enhancement and excretion of contrast  from both kidneys. No evidence of solid renal mass. There is  a filling  defect in the urinary bladder adjacent to the right ureterovesical  junction that measures 1.3 cm (series 7, image 70).     A small calcified gallstone is noted. No CT evidence of cholecystitis.  The liver, spleen, pancreas, and adrenal glands are unremarkable. No  abdominal or retroperitoneal adenopathy. No pelvic lymphadenopathy. No  free fluid in the pelvis. No suspicious bony lesions.         IMPRESSION:  1. There is a filling defect in the urinary bladder adjacent to the  right ureterovesical junction that could represent a mass or a blood  clot. Correlate with cystoscopy. No other cause for hematuria  demonstrated.  2. Cholelithiasis.     JI YIP MD  REVIEW OF SYSTEMS:     has Microscopic hematuria; Hyperlipidemia with target LDL less than 130; Primary bladder papillary carcinoma (H); Hypertension goal BP (blood pressure) < 140/80; Bladder cancer (H); Bladder tumor; Abscess, bladder; Health Care Home; ACP (advance care planning); Rotator cuff syndrome of left shoulder; Right foot pain; Primary osteoarthritis of right knee; Underweight; Bilateral sensorineural hearing loss; and Senile cataract of left eye on his problem list.    Review Of Systems    Skin: negative for, pigmentation, acne, rash, scaling, itching, bruising, lumps or bumps, hair changes, nail changes, DRY SKIN    Eyes: visual blurring, double vision, glaucoma, cataracts, eye pain, color blindness, glasses, contacts,  MAY NEED READING GLASSES    NEEDS EYE MD     Ears/Nose/Throat: negative for, nasal congestion, purulent rhinorrhea, sneezing, postnasal drainage, deafness, tinnitus, vertigo, epistaxis, deviated septum, frequent URI's, sinus trouble, persistent sore throat, tonsillitis, bleeding gums, dental problem, hoarseness    Respiratory: No shortness of breath, dyspnea on exertion, cough, or hemoptysis OBSTRUCTIVE SLEEP APNEA     Cardiovascular: negative for, palpitations, tachycardia, irregular heart beat, chest pain,  exertional chest pain or pressure, paroxysmal nocturnal dyspnea, dyspnea on exertion, orthopnea, lower extremity edema, syncope or near-syncope, cyanosis, claudication, phlebitis, varicose veins and exercise intolerance    Gastrointestinal: negative for, poor appetite, dysphagia, nausea, vomiting, heartburn, dyspepsia, reflux, hematemesis, abdominal pain, excessive gas or bloating, colon polyps, hemorrhoids, melena, hematochezia, constipation, diarrhea, stomach or duodenal ulcer and  HE HAS CHOLELITHIASIS SEVERAL    Genitourinary:  BLADDER CANCER INTERMITTENT 2014 RECURRENT LESION  and nocturia    Musculoskeletal: back pain, neck pain, arthritis, joint pain, joint swelling, joint stiffness and  OCCASIONAL LOWER BACK PAIN     Neurologic: negative for, migraine headaches, headaches, syncope, stroke, seizures, paralysis, local weakness, numbness or tingling of hands, numbness or tingling of feet, involuntary movements, speech problems, incoordination, memory problems, behavior changes and tremor    Psychiatric: negative for, anxiety and depression    Hematologic/Lymphatic/Immunologic: anemia  Endocrine: negative for, thyroid disorder and diabetes          EXAM:   /82 (Cuff Size: Adult Regular)  Pulse 66  Temp 97.6  F (36.4  C) (Tympanic)  Resp 16  Wt 136 lb 8 oz (61.9 kg)  SpO2 99%  BMI 20.75 kg/m2  Constitutional: general appearance, well nourished, well developed, in no acute distress, well developed, appears stated age, normal body habitus,  SLENDER HABITUS     Eyes:; The patient has normal eyelids sclerae and conjunctivae :    NORMAL FUNDI     Ears/Nose/Throat: external ear, overall: normal appearance; external nose, overall: benign appearance, normal moujth gums and lips  The patient has:   NORMAL TYMPANIC MEMBRANES  FRACTURE LEFT UPPER MOLAR     Neck: thyroid, overall: normal size, normal consistency, nontender,      Respiratory:  palpation of chest, overall: normal excursion,      NORMAL  BREATHING  CARDIAC NORMAL REGULAR SINUS RHYTHM WITHOUT MURMUR      Integument: inspection of skin, no rash, lesions; and, palpation, no induration, no tenderness.     Neurologic mental status, overall: alert and oriented; gait, no ataxia, no unsteadiness; coordination, no tremors; cranial nerves, overall: normal motor, overall: normal bulk, tone.      Psychiatric: orientation/consciousness, overall: oriented to person, place and time; behavior/psychomotor activity, no tics, normal psychomotor activity; mood and affect, overall: normal mood and affect; appearance, overall: well-groomed, good eye contact; speech, overall: normal quality, no aphasia and normal quality, quantity, intact.          DIAGNOSTICS:   EKG: appears normal, NSR, normal axis, normal intervals, no acute ST/T changes c/w ischemia, no LVH by voltage criteria, unchanged from previous tracings  Chest XRay    Recent Labs   Lab Test  12/07/17   0935  10/06/16   0916   03/02/15   1540   HGB  14.2  13.6   < >  9.2*   PLT  224  213   < >   --    INR   --    --    --   1.09   NA  138  138   < >   --    POTASSIUM  4.7  4.7   < >   --    CR  0.85  0.93   < >   --     < > = values in this interval not displayed.    CHEST TWO VIEWS   3/6/2018 8:42 AM      HISTORY:  Preop general physical exam.     COMPARISON: 2/26/2015.     FINDINGS: The heart size is normal. The thoracic aorta is calcified  and mildly tortuous. The lungs are mildly hyperinflated but clear. No  pneumothorax or pleural effusion. Degenerative disease in the thoracic  spine.          IMPRESSION: No acute abnormality.  Normal resting electrocardiogram   IMPRESSION:   Reason for surgery/procedure:  BLADDER TUMOR RECURRENT  Diagnosis/reason for consult:  RECURRENT BLADDER TUMOR     The proposed surgical procedure is considered LOW risk.    REVISED CARDIAC RISK INDEX  The patient has the following serious cardiovascular risks for perioperative complications such as (MI, PE, VFib and 3  AV Block):  No  serious cardiac risks  INTERPRETATION: 1 risks: Class II (low risk - 0.9% complication rate)    The patient has the following additional risks for perioperative complications:  The 10-year ASCVD risk score (Ketankhris SCHWARTZ Jr, et al., 2013) is: 21.6%    Values used to calculate the score:      Age: 67 years      Sex: Male      Is Non- : No      Diabetic: No      Tobacco smoker: No      Systolic Blood Pressure: 140 mmHg      Is BP treated: Yes      HDL Cholesterol: 78 mg/dL      Total Cholesterol: 270 mg/dL      ICD-10-CM    1. Preop general physical exam Z01.818 Basic metabolic panel     **CBC with platelets FUTURE 14d   2. Bladder tumor D49.4 Basic metabolic panel     **CBC with platelets FUTURE 14d   3. Hypertension goal BP (blood pressure) < 140/80 I10 Basic metabolic panel     **CBC with platelets FUTURE 14d       RECOMMENDATIONS:       Obstructive Sleep Apnea (or suspected sleep apnea)   very mild obstrucive sleep apnea according to wife   Refused workup       --Patient is to take all scheduled medications on the day of surgery EXCEPT for modifications listed below.    PATIENT IS CLEARED FOR SURGERY  BARRING UNFORSEEN COMPLICATIONS  SINUS  BRADYCARDIA        Signed Electronically by: EVERTON TRIPLETT MD    Copy of this evaluation report is provided to requesting physician.    [facility] Preop Guidelines

## 2018-03-06 NOTE — NURSING NOTE
"Chief Complaint   Patient presents with     Pre-Op Exam       Initial /82 (Cuff Size: Adult Regular)  Pulse 66  Temp 97.6  F (36.4  C) (Tympanic)  Resp 16  Wt 136 lb 8 oz (61.9 kg)  SpO2 99%  BMI 20.75 kg/m2 Estimated body mass index is 20.75 kg/(m^2) as calculated from the following:    Height as of 2/22/18: 5' 8\" (1.727 m).    Weight as of this encounter: 136 lb 8 oz (61.9 kg).  Medication Reconciliation: complete   Yenny Jackson CMA    "

## 2018-03-06 NOTE — LETTER
March 7, 2018      Lobo RUIZ Ferny  [address] [facility] [address]        Dear ,    We are writing to inform you of your test results.    Your test results fall within the expected range(s) or remain unchanged from previous results.  Please continue with current treatment plan.    Resulted Orders   Basic metabolic panel   Result Value Ref Range    Sodium 137 133 - 144 mmol/L    Potassium 4.6 3.4 - 5.3 mmol/L    Chloride 103 94 - 109 mmol/L    Carbon Dioxide 30 20 - 32 mmol/L    Anion Gap 4 3 - 14 mmol/L    Glucose 99 70 - 99 mg/dL      Comment:      Fasting specimen    Urea Nitrogen 15 7 - 30 mg/dL    Creatinine 0.92 0.66 - 1.25 mg/dL    GFR Estimate 82 >60 mL/min/1.7m2      Comment:      Non  GFR Calc    GFR Estimate If Black >90 >60 mL/min/1.7m2      Comment:       GFR Calc    Calcium 8.8 8.5 - 10.1 mg/dL       If you have any questions or concerns, please call the clinic at the number listed above.       Sincerely,        EVERTON TRIPLETT MD

## 2018-03-06 NOTE — PROGRESS NOTES
[address]  [address]  [facility] [address]  [phone]  Dept: [phone]    PRE-OP EVALUATION:  Today's date: 3/6/2018    Lobo Isaacs (: 1950) presents for pre-operative evaluation assessment as requested by Dr. Torres.  He requires evaluation and anesthesia risk assessment prior to undergoing surgery/procedure for treatment of Bladder Surgery .    [phone]  Primary Physician: Efra Crockett  Type of Anesthesia Anticipated: to be determined    Patient has a Health Care Directive or Living Will:  YES     Preop Questions 3/6/2018   Who is doing your surgery? Dr Torres   What are you having done? Bladder surgery   Date of Surgery/Procedure: Mar 8 2018   Facility or Hospital where procedure/surgery will be performed: [facility]   1.  Do you have a history of Heart attack, stroke, stent, coronary bypass surgery, or other heart surgery? No   2.  Do you ever have any pain or discomfort in your chest? No   3.  Do you have a history of  Heart Failure? No   4.   Are you troubled by shortness of breath when:  walking on a level surface, or up a slight hill, or at night? No   5.  Do you currently have a cold, bronchitis or other respiratory infection? No   6.  Do you have a cough, shortness of breath, or wheezing? No   7.  Do you sometimes get pains in the calves of your legs when you walk? No   8. Do you or anyone in your family have previous history of blood clots? No   9.  Do you or does anyone in your family have a serious bleeding problem such as prolonged bleeding following surgeries or cuts? No   10. Have you ever had problems with anemia or been told to take iron pills? No   11. Have you had any abnormal blood loss such as black, tarry or bloody stools? No         HPI:     HPI related to upcoming procedure:  Recurrent bladder cancer   Residual blood on urine analysis   Filling defect in bladder   Renal pyramid changes  bilateral       HYPERTENSION - Patient has longstanding history of HTN , currently denies any symptoms referable to elevated blood pressure. Specifically denies chest pain, palpitations, dyspnea, orthopnea, PND or peripheral edema. Blood pressure readings have been in normal range. Current medication regimen is as listed below. Patient denies any side effects of medication.                                                                                                                                                                                          .  HYPERLIPIDEMIA - Patient has a long history of significant Hyperlipidemia requiring medication for treatment with recent good control. Patient reports no problems or side effects with the medication.                                                                                                                                                       .  SLEEP PROBLEM - Patient has a longstanding history of snoring, excessive daytime somnolence, fatigue and periodic leg movement of moderate severity. Patient has tried OTC medications with limited success.                                                                                                                                         .    MEDICAL HISTORY:     Patient Active Problem List    Diagnosis Date Noted     Right foot pain 10/06/2016     Priority: Medium     Primary osteoarthritis of right knee 10/06/2016     Priority: Medium     Underweight 10/06/2016     Priority: Medium     Bilateral sensorineural hearing loss 10/06/2016     Priority: Medium     Senile cataract of left eye 10/06/2016     Priority: Medium     Rotator cuff syndrome of left shoulder 08/11/2015     Priority: Medium     ACP (advance care planning) 07/23/2015     Priority: Medium     Advance Care Planning 7/23/2015: Receipt of ACP document:  Received: Health Care Directive which was witnessed or notarized on 7/6/15.  Document previously  scanned on 7/14/15.  Validation form completed and sent to be scanned.  Code Status reflects choices in most recent ACP document.  Confirmed/documented designated decision maker(s).  Added by [facility] 03/13/2015     Priority: Medium     Status:  Declined  Care Coordinator:  Fifi Burrows RN CCM    See Letters for [facility] Care Plan  Date:  March 13, 2015         Abscess, bladder 03/11/2015     Priority: Medium     Bladder tumor 03/03/2015     Priority: Medium     Bladder cancer (H) 03/02/2015     Priority: Medium     Primary bladder papillary carcinoma (H) 02/26/2015     Priority: Medium     Hypertension goal BP (blood pressure) < 140/80 02/26/2015     Priority: Medium     Hyperlipidemia with target LDL less than 130 02/11/2015     Priority: Medium     Diagnosis updated by automated process. Provider to review and confirm.       Microscopic hematuria 12/27/2012     Priority: Medium      Past Medical History:   Diagnosis Date     Anemia      Bladder cancer (H)      History of blood transfusion 3/2015    bladder surgery(2 Units)     Hyperlipidemia      Hypertension      Mumps      Past Surgical History:   Procedure Laterality Date     CYSTOSCOPY       CYSTOSCOPY, CYSTOGRAM, COMBINED N/A 3/2/2015    Procedure: COMBINED CYSTOSCOPY, CYSTOGRAM;  Surgeon: Cody Torres MD;  Location:  OR     CYSTOSCOPY, FULGURATE BLEEDERS, EVACUATE CLOT(S), COMBINED N/A 3/2/2015    Procedure: COMBINED CYSTOSCOPY, FULGURATE BLEEDERS, EVACUATE CLOT(S);  Surgeon: Cody Torres MD;  Location:  OR     CYSTOSCOPY, RETROGRADES, COMBINED  3/2/2015    Procedure: COMBINED CYSTOSCOPY, RETROGRADES;  Surgeon: Cody Torres MD;  Location:  OR     CYSTOSCOPY, RETROGRADES, COMBINED  7/7/2015    Procedure: COMBINED CYSTOSCOPY, RETROGRADES;  Surgeon: Cody Torres MD;  Location:  OR     CYSTOSCOPY, TRANSURETHRAL RESECTION (TUR) TUMOR BLADDER, COMBINED N/A 3/2/2015    Procedure: COMBINED  CYSTOSCOPY, TRANSURETHRAL RESECTION (TUR) TUMOR BLADDER;  Surgeon: Cody Torres MD;  Location:  OR     CYSTOSCOPY, TRANSURETHRAL RESECTION (TUR) TUMOR BLADDER, COMBINED N/A 7/7/2015    Procedure: COMBINED CYSTOSCOPY, TRANSURETHRAL RESECTION (TUR) TUMOR BLADDER;  Surgeon: Cody Torres MD;  Location:  OR     HERNIORRHAPHY INGUINAL Right 9/4/2015    Procedure: HERNIORRHAPHY INGUINAL;  Surgeon: Levi Warren MD;  Location:  [facility]     LAPAROSCOPIC HERNIORRHAPHY INGUINAL Right 9/4/2015    Procedure: LAPAROSCOPIC HERNIORRHAPHY INGUINAL;  Surgeon: Levi Warren MD;  Location: [facility]     Current Outpatient Prescriptions   Medication Sig Dispense Refill     metoprolol succinate (TOPROL-XL) 50 MG 24 hr tablet Take 1 tablet (50 mg) by mouth daily 90 tablet 3     [DISCONTINUED] metoprolol succinate (TOPROL-XL) 50 MG 24 hr tablet TAKE ONE TABLET BY MOUTH ONCE DAILY (Patient not taking: Reported on 3/6/2018) 90 tablet 3     Omega-3 Fatty Acids (OMEGA-3 FISH OIL PO) Take by mouth daily       OTC products: no recent use of OTC ASA, NSAIDS or Steroids    No Known Allergies   Latex Allergy: NO    Social History   Substance Use Topics     Smoking status: Former Smoker     Smokeless tobacco: Never Used     Alcohol use No     History   Drug Use No     CT ABDOMEN/PELVIS HEMATURIA WITH AND WITHOUT IV CONTRAST March 5, 2018  2:58 PM      HISTORY: Malignant neoplasm of urinary bladder, unspecified site (H).     TECHNIQUE: 100mL Isovue-370. Radiation dose for this scan was reduced  using automated exposure control, adjustment of the mA and/or kV  according to patient size, or iterative reconstruction technique.     COMPARISON: 3/10/2015.     FINDINGS: There is slight high density of the renal pyramids  bilaterally, likely a variant of normal. No urinary tract calculi are  demonstrated. There is symmetric enhancement and excretion of contrast  from both kidneys. No evidence of solid renal mass. There is  a filling  defect in the urinary bladder adjacent to the right ureterovesical  junction that measures 1.3 cm (series 7, image 70).     A small calcified gallstone is noted. No CT evidence of cholecystitis.  The liver, spleen, pancreas, and adrenal glands are unremarkable. No  abdominal or retroperitoneal adenopathy. No pelvic lymphadenopathy. No  free fluid in the pelvis. No suspicious bony lesions.         IMPRESSION:  1. There is a filling defect in the urinary bladder adjacent to the  right ureterovesical junction that could represent a mass or a blood  clot. Correlate with cystoscopy. No other cause for hematuria  demonstrated.  2. Cholelithiasis.     JI YIP MD  REVIEW OF SYSTEMS:     has Microscopic hematuria; Hyperlipidemia with target LDL less than 130; Primary bladder papillary carcinoma (H); Hypertension goal BP (blood pressure) < 140/80; Bladder cancer (H); Bladder tumor; Abscess, bladder; Health Care Home; ACP (advance care planning); Rotator cuff syndrome of left shoulder; Right foot pain; Primary osteoarthritis of right knee; Underweight; Bilateral sensorineural hearing loss; and Senile cataract of left eye on his problem list.    Review Of Systems    Skin: negative for, pigmentation, acne, rash, scaling, itching, bruising, lumps or bumps, hair changes, nail changes, DRY SKIN    Eyes: visual blurring, double vision, glaucoma, cataracts, eye pain, color blindness, glasses, contacts,  MAY NEED READING GLASSES    NEEDS EYE MD     Ears/Nose/Throat: negative for, nasal congestion, purulent rhinorrhea, sneezing, postnasal drainage, deafness, tinnitus, vertigo, epistaxis, deviated septum, frequent URI's, sinus trouble, persistent sore throat, tonsillitis, bleeding gums, dental problem, hoarseness    Respiratory: No shortness of breath, dyspnea on exertion, cough, or hemoptysis OBSTRUCTIVE SLEEP APNEA     Cardiovascular: negative for, palpitations, tachycardia, irregular heart beat, chest pain,  exertional chest pain or pressure, paroxysmal nocturnal dyspnea, dyspnea on exertion, orthopnea, lower extremity edema, syncope or near-syncope, cyanosis, claudication, phlebitis, varicose veins and exercise intolerance    Gastrointestinal: negative for, poor appetite, dysphagia, nausea, vomiting, heartburn, dyspepsia, reflux, hematemesis, abdominal pain, excessive gas or bloating, colon polyps, hemorrhoids, melena, hematochezia, constipation, diarrhea, stomach or duodenal ulcer and  HE HAS CHOLELITHIASIS SEVERAL    Genitourinary:  BLADDER CANCER INTERMITTENT 2014 RECURRENT LESION  and nocturia    Musculoskeletal: back pain, neck pain, arthritis, joint pain, joint swelling, joint stiffness and  OCCASIONAL LOWER BACK PAIN     Neurologic: negative for, migraine headaches, headaches, syncope, stroke, seizures, paralysis, local weakness, numbness or tingling of hands, numbness or tingling of feet, involuntary movements, speech problems, incoordination, memory problems, behavior changes and tremor    Psychiatric: negative for, anxiety and depression    Hematologic/Lymphatic/Immunologic: anemia  Endocrine: negative for, thyroid disorder and diabetes          EXAM:   /82 (Cuff Size: Adult Regular)  Pulse 66  Temp 97.6  F (36.4  C) (Tympanic)  Resp 16  Wt 136 lb 8 oz (61.9 kg)  SpO2 99%  BMI 20.75 kg/m2  Constitutional: general appearance, well nourished, well developed, in no acute distress, well developed, appears stated age, normal body habitus,  SLENDER HABITUS     Eyes:; The patient has normal eyelids sclerae and conjunctivae :    NORMAL FUNDI     Ears/Nose/Throat: external ear, overall: normal appearance; external nose, overall: benign appearance, normal moujth gums and lips  The patient has:   NORMAL TYMPANIC MEMBRANES  FRACTURE LEFT UPPER MOLAR     Neck: thyroid, overall: normal size, normal consistency, nontender,      Respiratory:  palpation of chest, overall: normal excursion,      NORMAL  BREATHING  CARDIAC NORMAL REGULAR SINUS RHYTHM WITHOUT MURMUR      Integument: inspection of skin, no rash, lesions; and, palpation, no induration, no tenderness.     Neurologic mental status, overall: alert and oriented; gait, no ataxia, no unsteadiness; coordination, no tremors; cranial nerves, overall: normal motor, overall: normal bulk, tone.      Psychiatric: orientation/consciousness, overall: oriented to person, place and time; behavior/psychomotor activity, no tics, normal psychomotor activity; mood and affect, overall: normal mood and affect; appearance, overall: well-groomed, good eye contact; speech, overall: normal quality, no aphasia and normal quality, quantity, intact.          DIAGNOSTICS:   EKG: appears normal, NSR, normal axis, normal intervals, no acute ST/T changes c/w ischemia, no LVH by voltage criteria, unchanged from previous tracings  Chest XRay    Recent Labs   Lab Test  12/07/17   0935  10/06/16   0916   03/02/15   1540   HGB  14.2  13.6   < >  9.2*   PLT  224  213   < >   --    INR   --    --    --   1.09   NA  138  138   < >   --    POTASSIUM  4.7  4.7   < >   --    CR  0.85  0.93   < >   --     < > = values in this interval not displayed.    CHEST TWO VIEWS   3/6/2018 8:42 AM      HISTORY:  Preop general physical exam.     COMPARISON: 2/26/2015.     FINDINGS: The heart size is normal. The thoracic aorta is calcified  and mildly tortuous. The lungs are mildly hyperinflated but clear. No  pneumothorax or pleural effusion. Degenerative disease in the thoracic  spine.          IMPRESSION: No acute abnormality.  Normal resting electrocardiogram   IMPRESSION:   Reason for surgery/procedure:  BLADDER TUMOR RECURRENT  Diagnosis/reason for consult:  RECURRENT BLADDER TUMOR     The proposed surgical procedure is considered LOW risk.    REVISED CARDIAC RISK INDEX  The patient has the following serious cardiovascular risks for perioperative complications such as (MI, PE, VFib and 3  AV Block):  No  serious cardiac risks  INTERPRETATION: 1 risks: Class II (low risk - 0.9% complication rate)    The patient has the following additional risks for perioperative complications:  The 10-year ASCVD risk score (Ketankhris SCHWARTZ Jr, et al., 2013) is: 21.6%    Values used to calculate the score:      Age: 67 years      Sex: Male      Is Non- : No      Diabetic: No      Tobacco smoker: No      Systolic Blood Pressure: 140 mmHg      Is BP treated: Yes      HDL Cholesterol: 78 mg/dL      Total Cholesterol: 270 mg/dL      ICD-10-CM    1. Preop general physical exam Z01.818 Basic metabolic panel     **CBC with platelets FUTURE 14d   2. Bladder tumor D49.4 Basic metabolic panel     **CBC with platelets FUTURE 14d   3. Hypertension goal BP (blood pressure) < 140/80 I10 Basic metabolic panel     **CBC with platelets FUTURE 14d       RECOMMENDATIONS:       Obstructive Sleep Apnea (or suspected sleep apnea)   very mild obstrucive sleep apnea according to wife   Refused workup       --Patient is to take all scheduled medications on the day of surgery EXCEPT for modifications listed below.    PATIENT IS CLEARED FOR SURGERY  BARRING UNFORSEEN COMPLICATIONS  SINUS  BRADYCARDIA        Signed Electronically by: EVERTON TRIPLETT MD    Copy of this evaluation report is provided to requesting physician.    [facility] Preop Guidelines

## 2018-03-08 ENCOUNTER — APPOINTMENT (OUTPATIENT)
Dept: GENERAL RADIOLOGY | Facility: CLINIC | Age: 68
End: 2018-03-08
Attending: UROLOGY
Payer: MEDICARE

## 2018-03-08 ENCOUNTER — HOSPITAL ENCOUNTER (OUTPATIENT)
Facility: CLINIC | Age: 68
Discharge: HOME | End: 2018-03-08
Attending: UROLOGY | Admitting: UROLOGY
Payer: MEDICARE

## 2018-03-08 ENCOUNTER — ANESTHESIA EVENT (OUTPATIENT)
Dept: SURGERY | Facility: CLINIC | Age: 68
End: 2018-03-08
Payer: MEDICARE

## 2018-03-08 ENCOUNTER — ANESTHESIA (OUTPATIENT)
Dept: SURGERY | Facility: CLINIC | Age: 68
End: 2018-03-08
Payer: MEDICARE

## 2018-03-08 VITALS
HEIGHT: 69 IN | TEMPERATURE: 97.2 F | SYSTOLIC BLOOD PRESSURE: 126 MMHG | BODY MASS INDEX: 19.77 KG/M2 | DIASTOLIC BLOOD PRESSURE: 71 MMHG | WEIGHT: 133.5 LBS | OXYGEN SATURATION: 99 % | RESPIRATION RATE: 16 BRPM

## 2018-03-08 DIAGNOSIS — Z85.51 PERSONAL HISTORY OF MALIGNANT NEOPLASM OF BLADDER: ICD-10-CM

## 2018-03-08 PROCEDURE — 88307 TISSUE EXAM BY PATHOLOGIST: CPT | Performed by: UROLOGY

## 2018-03-08 PROCEDURE — 88307 TISSUE EXAM BY PATHOLOGIST: CPT | Mod: 26 | Performed by: UROLOGY

## 2018-03-08 PROCEDURE — 25000125 ZZHC RX 250: Performed by: NURSE ANESTHETIST, CERTIFIED REGISTERED

## 2018-03-08 PROCEDURE — 25000128 H RX IP 250 OP 636: Performed by: NURSE ANESTHETIST, CERTIFIED REGISTERED

## 2018-03-08 PROCEDURE — 25000128 H RX IP 250 OP 636: Performed by: ANESTHESIOLOGY

## 2018-03-08 PROCEDURE — 36000058 ZZH SURGERY LEVEL 3 EA 15 ADDTL MIN: Performed by: UROLOGY

## 2018-03-08 PROCEDURE — 37000008 ZZH ANESTHESIA TECHNICAL FEE, 1ST 30 MIN: Performed by: UROLOGY

## 2018-03-08 PROCEDURE — 88305 TISSUE EXAM BY PATHOLOGIST: CPT | Performed by: UROLOGY

## 2018-03-08 PROCEDURE — 71000012 ZZH RECOVERY PHASE 1 LEVEL 1 FIRST HR: Performed by: UROLOGY

## 2018-03-08 PROCEDURE — 25000128 H RX IP 250 OP 636: Performed by: UROLOGY

## 2018-03-08 PROCEDURE — 36000056 ZZH SURGERY LEVEL 3 1ST 30 MIN: Performed by: UROLOGY

## 2018-03-08 PROCEDURE — A9270 NON-COVERED ITEM OR SERVICE: HCPCS | Performed by: UROLOGY

## 2018-03-08 PROCEDURE — 74420 UROGRAPHY RTRGR +-KUB: CPT | Mod: 26 | Performed by: UROLOGY

## 2018-03-08 PROCEDURE — 52235 CYSTOSCOPY AND TREATMENT: CPT | Performed by: UROLOGY

## 2018-03-08 PROCEDURE — 37000009 ZZH ANESTHESIA TECHNICAL FEE, EACH ADDTL 15 MIN: Performed by: UROLOGY

## 2018-03-08 PROCEDURE — C1758 CATHETER, URETERAL: HCPCS | Performed by: UROLOGY

## 2018-03-08 PROCEDURE — 71000013 ZZH RECOVERY PHASE 1 LEVEL 1 EA ADDTL HR: Performed by: UROLOGY

## 2018-03-08 PROCEDURE — 25000125 ZZHC RX 250: Performed by: UROLOGY

## 2018-03-08 PROCEDURE — 40000277 XR SURGERY CARM FLUORO LESS THAN 5 MIN W STILLS

## 2018-03-08 PROCEDURE — 27210794 ZZH OR GENERAL SUPPLY STERILE: Performed by: UROLOGY

## 2018-03-08 PROCEDURE — 71000027 ZZH RECOVERY PHASE 2 EACH 15 MINS: Performed by: UROLOGY

## 2018-03-08 PROCEDURE — 40000170 ZZH STATISTIC PRE-PROCEDURE ASSESSMENT II: Performed by: UROLOGY

## 2018-03-08 PROCEDURE — 25000566 ZZH SEVOFLURANE, EA 15 MIN: Performed by: UROLOGY

## 2018-03-08 PROCEDURE — 88305 TISSUE EXAM BY PATHOLOGIST: CPT | Mod: 26 | Performed by: UROLOGY

## 2018-03-08 RX ORDER — ONDANSETRON 2 MG/ML
4 INJECTION INTRAMUSCULAR; INTRAVENOUS EVERY 30 MIN PRN
Status: DISCONTINUED | OUTPATIENT
Start: 2018-03-08 | End: 2018-03-08 | Stop reason: HOSPADM

## 2018-03-08 RX ORDER — CEFAZOLIN SODIUM 2 G/100ML
2 INJECTION, SOLUTION INTRAVENOUS
Status: COMPLETED | OUTPATIENT
Start: 2018-03-08 | End: 2018-03-08

## 2018-03-08 RX ORDER — CIPROFLOXACIN 250 MG/1
250 TABLET, FILM COATED ORAL 2 TIMES DAILY
Qty: 2 TABLET | Refills: 0 | Status: SHIPPED | OUTPATIENT
Start: 2018-03-08 | End: 2019-02-19

## 2018-03-08 RX ORDER — FENTANYL CITRATE 50 UG/ML
INJECTION, SOLUTION INTRAMUSCULAR; INTRAVENOUS PRN
Status: DISCONTINUED | OUTPATIENT
Start: 2018-03-08 | End: 2018-03-08

## 2018-03-08 RX ORDER — LIDOCAINE HYDROCHLORIDE 20 MG/ML
INJECTION, SOLUTION INFILTRATION; PERINEURAL PRN
Status: DISCONTINUED | OUTPATIENT
Start: 2018-03-08 | End: 2018-03-08

## 2018-03-08 RX ORDER — DEXAMETHASONE SODIUM PHOSPHATE 4 MG/ML
INJECTION, SOLUTION INTRA-ARTICULAR; INTRALESIONAL; INTRAMUSCULAR; INTRAVENOUS; SOFT TISSUE PRN
Status: DISCONTINUED | OUTPATIENT
Start: 2018-03-08 | End: 2018-03-08

## 2018-03-08 RX ORDER — FENTANYL CITRATE 0.05 MG/ML
25-50 INJECTION, SOLUTION INTRAMUSCULAR; INTRAVENOUS
Status: DISCONTINUED | OUTPATIENT
Start: 2018-03-08 | End: 2018-03-08 | Stop reason: HOSPADM

## 2018-03-08 RX ORDER — CEFAZOLIN SODIUM 1 G
1 VIAL (EA) INJECTION SEE ADMIN INSTRUCTIONS
Status: DISCONTINUED | OUTPATIENT
Start: 2018-03-08 | End: 2018-03-08 | Stop reason: HOSPADM

## 2018-03-08 RX ORDER — MEPERIDINE HYDROCHLORIDE 25 MG/ML
12.5 INJECTION INTRAMUSCULAR; INTRAVENOUS; SUBCUTANEOUS
Status: DISCONTINUED | OUTPATIENT
Start: 2018-03-08 | End: 2018-03-08 | Stop reason: HOSPADM

## 2018-03-08 RX ORDER — PROPOFOL 10 MG/ML
INJECTION, EMULSION INTRAVENOUS PRN
Status: DISCONTINUED | OUTPATIENT
Start: 2018-03-08 | End: 2018-03-08

## 2018-03-08 RX ORDER — ONDANSETRON 2 MG/ML
INJECTION INTRAMUSCULAR; INTRAVENOUS PRN
Status: DISCONTINUED | OUTPATIENT
Start: 2018-03-08 | End: 2018-03-08

## 2018-03-08 RX ORDER — HYDROMORPHONE HYDROCHLORIDE 1 MG/ML
.3-.5 INJECTION, SOLUTION INTRAMUSCULAR; INTRAVENOUS; SUBCUTANEOUS EVERY 10 MIN PRN
Status: DISCONTINUED | OUTPATIENT
Start: 2018-03-08 | End: 2018-03-08 | Stop reason: HOSPADM

## 2018-03-08 RX ORDER — SODIUM CHLORIDE, SODIUM LACTATE, POTASSIUM CHLORIDE, CALCIUM CHLORIDE 600; 310; 30; 20 MG/100ML; MG/100ML; MG/100ML; MG/100ML
INJECTION, SOLUTION INTRAVENOUS CONTINUOUS
Status: DISCONTINUED | OUTPATIENT
Start: 2018-03-08 | End: 2018-03-08 | Stop reason: HOSPADM

## 2018-03-08 RX ORDER — NALOXONE HYDROCHLORIDE 0.4 MG/ML
.1-.4 INJECTION, SOLUTION INTRAMUSCULAR; INTRAVENOUS; SUBCUTANEOUS
Status: DISCONTINUED | OUTPATIENT
Start: 2018-03-08 | End: 2018-03-08 | Stop reason: HOSPADM

## 2018-03-08 RX ORDER — EPHEDRINE SULFATE 50 MG/ML
INJECTION, SOLUTION INTRAMUSCULAR; INTRAVENOUS; SUBCUTANEOUS PRN
Status: DISCONTINUED | OUTPATIENT
Start: 2018-03-08 | End: 2018-03-08

## 2018-03-08 RX ORDER — ONDANSETRON 4 MG/1
4 TABLET, ORALLY DISINTEGRATING ORAL EVERY 30 MIN PRN
Status: DISCONTINUED | OUTPATIENT
Start: 2018-03-08 | End: 2018-03-08 | Stop reason: HOSPADM

## 2018-03-08 RX ORDER — FENTANYL CITRATE 50 UG/ML
25-50 INJECTION, SOLUTION INTRAMUSCULAR; INTRAVENOUS
Status: DISCONTINUED | OUTPATIENT
Start: 2018-03-08 | End: 2018-03-08 | Stop reason: HOSPADM

## 2018-03-08 RX ADMIN — Medication 5 MG: at 08:05

## 2018-03-08 RX ADMIN — SODIUM CHLORIDE, POTASSIUM CHLORIDE, SODIUM LACTATE AND CALCIUM CHLORIDE: 600; 310; 30; 20 INJECTION, SOLUTION INTRAVENOUS at 07:23

## 2018-03-08 RX ADMIN — CEFAZOLIN SODIUM 2 G: 2 INJECTION, SOLUTION INTRAVENOUS at 07:36

## 2018-03-08 RX ADMIN — FENTANYL CITRATE 50 MCG: 50 INJECTION, SOLUTION INTRAMUSCULAR; INTRAVENOUS at 07:27

## 2018-03-08 RX ADMIN — LIDOCAINE HYDROCHLORIDE 40 MG: 20 INJECTION, SOLUTION INFILTRATION; PERINEURAL at 07:27

## 2018-03-08 RX ADMIN — ONDANSETRON 4 MG: 2 INJECTION INTRAMUSCULAR; INTRAVENOUS at 08:05

## 2018-03-08 RX ADMIN — Medication 5 MG: at 07:55

## 2018-03-08 RX ADMIN — FENTANYL CITRATE 25 MCG: 50 INJECTION, SOLUTION INTRAMUSCULAR; INTRAVENOUS at 07:56

## 2018-03-08 RX ADMIN — FENTANYL CITRATE 25 MCG: 50 INJECTION, SOLUTION INTRAMUSCULAR; INTRAVENOUS at 07:48

## 2018-03-08 RX ADMIN — PROPOFOL 200 MG: 10 INJECTION, EMULSION INTRAVENOUS at 07:27

## 2018-03-08 RX ADMIN — DEXMEDETOMIDINE HYDROCHLORIDE 4 MCG: 100 INJECTION, SOLUTION INTRAVENOUS at 07:45

## 2018-03-08 RX ADMIN — DEXAMETHASONE SODIUM PHOSPHATE 4 MG: 4 INJECTION, SOLUTION INTRA-ARTICULAR; INTRALESIONAL; INTRAMUSCULAR; INTRAVENOUS; SOFT TISSUE at 07:32

## 2018-03-08 RX ADMIN — MIDAZOLAM 2 MG: 1 INJECTION INTRAMUSCULAR; INTRAVENOUS at 07:23

## 2018-03-08 NOTE — DISCHARGE INSTRUCTIONS
Same Day Surgery Discharge Instructions for  Sedation and General Anesthesia       It's not unusual to feel dizzy, light-headed or faint for up to 24 hours after surgery or while taking pain medication.  If you have these symptoms: sit for a few minutes before standing and have someone assist you when you get up to walk or use the bathroom.      You should rest and relax for the next 24 hours. We recommend you make arrangements to have an adult stay with you for at least 24 hours after your discharge.  Avoid hazardous and strenuous activity.      DO NOT DRIVE any vehicle or operate mechanical equipment for 24 hours following the end of your surgery.  Even though you may feel normal, your reactions may be affected by the medication you have received.      Do not drink alcoholic beverages for 24 hours following surgery.       Slowly progress to your regular diet as you feel able. It's not unusual to feel nauseated and/or vomit after receiving anesthesia.  If you develop these symptoms, drink clear liquids (apple juice, ginger ale, broth, 7-up, etc. ) until you feel better.  If your nausea and vomiting persists for 24 hours, please notify your surgeon.        All narcotic pain medications, along with inactivity and anesthesia, can cause constipation. Drinking plenty of liquids and increasing fiber intake will help.      For any questions of a medical nature, call your surgeon.      Do not make important decisions for 24 hours.      If you had general anesthesia, you may have a sore throat for a couple of days related to the breathing tube used during surgery.  You may use Cepacol lozenges to help with this discomfort.  If it worsens or if you develop a fever, contact your surgeon.       If you feel your pain is not well managed with the pain medications prescribed by your surgeon, please contact your surgeon's office to let them know so they can address your concerns.       DISCHARGE INSTRUCTIONS FOR   CATHETER CARE AT  HOME      .      Basic Catheter Care  1. Always wash hands before and after handling your catheter.  2. Use soap and water to wash the area around your catheter.  3. Do this procedure twice a day.  4. Proper cleansing will help keep the area from becoming irritated or infected.    Leg Bag  This is a small plastic bag that collects urine draining from your catheter and then strapped around your thigh. It will need to be emptied when the bag is 1/2 to 3/4 full.     Large Drainage Bag  1. This bag is larger than the leg bag and holds more urine.  It is to be used while at home, especially at night.    2. Before you go to bed, change the leg bag to the large drainage bag.  3. Pinch off the catheter with your fingers and swab the connection between the catheter and leg bag with alcohol sponge.  4. Disconnect the leg bag and connect the large drainage bag to your catheter.  5. When you get into bed, arrange the drainage tubing so that it doesn t kink.  6. Be sure to keep the bag below the level of your bladder and allow enough slack for turning.    Cleaning Your Drainage Bags    1. Wash hands.  2. Using funnel or syringe, fill the bag half full with a solution of 1/2  vinegar and 1/2 water.  3. Shake bag, allowing mixture to cleanse inside of bag.  4. Empty out all vinegar and water mixture from your bag.  5. Hang bag to dry when not in use.  6. Clean your bags anytime you change them.      Helpful Hints  1. Always keep drainage bags below bladder level to insure adequate drainage.  2. Drink 4-6 glasses of water daily along with other fluids you normally drink to keep urine free of infection and / or clots.  3. If you notice no urine in your bag for 2 to 4 hours or you develop extreme discomfort in bladder area, your catheter maybe plugged.  Notify your doctor.  4. If you notice your urine becomes foul smelling and cloudy, notify your doctor.  Also notify your doctor if you develop fever or chills.  5. If you notice urine  leaking around the outside of the catheter, check to be sure catheter or tubing is not kinked.  6. Don t use leg bag while in bed.                HOW TO REMOVE YOUR CATHETER INSTRUCTIONS    1. Wash your hands.    2. Insert the syringe into balloon port of the catheter.    3. Withdraw all the fluid from the balloon.  You may have to re-insert the syringe into the port additional times until no more fluid can be withdrawn.    4. Pull gently on the catheter.  If no resistance, slowly withdraw.    5. Dispose of the catheter and the syringe.    6. Wash your hands.    7. Call your doctor if unable to urinate within 6-8 hours, or when uncomfortable.       Discharge Instructions for Transurethral Resection of a Bladder Tumor        Diet:     Diet as tolerated.    Drink at least 6 glasses of liquid a day-at least 3 glasses should be water.  Activity:     Avoid heavy lifting or strenuous activity     Increase activity as directed by your surgeon     Short walks and stair climbing are OK     Showering is OK  Care after surgery:    Do not hold urine in your bladder. Always empty your bladder when you have the urge to urinate .     Do not strain to have a bowel movement. If you constipated, take an over the counter stool softener until stools become normal or soft.  This may take several weeks.     Do not drive a car or have intercourse until cleared by your doctor.  It is not unusual to pass small clots or to have red-tinged urine. If this occurs, decrease activity, and increase your fluid intake. Generally, the urine will clear of blood within a day or two.    Call your physician if you have the following signs or symptoms:    Painful urination or unable to urinate.    Loss of bladder control or increased frequency of urination.    Have chills or temperature greater than 101 F.    Excessive blood in your urine           **If you have questions or concerns about your procedure,   call Dr. Torres at [phone]**

## 2018-03-08 NOTE — OP NOTE
Procedure Date: 03/08/2018      PREOPERATIVE DIAGNOSIS:  Bladder tumor.      POSTOPERATIVE DIAGNOSIS:  Bladder tumor.       SURGEON:  Cody Torres MD      ANESTHESIA:  General.        PROCEDURE:  Cystoscopy, bilateral retrograde pyelograms, bladder biopsies, transurethral resection of medium-sized bladder tumor with fulguration and Blood catheter insertion with fluoroscopic interpretation of images.      INDICATIONS:  This very pleasant 67-year-old gentleman was initially seen in 2015 with gross hematuria.  He was found at that time to have very extensive involvement of the bladder with  tumors, which fortunately after careful and extensive resection were all low-grade.  He did have an extraperitoneal leak into the bladder initially, so he was catheterized for a prolonged period until cystogram had showed the bladder had healed.  As a result, we did not consider an intravesical instillation of any agents at that time.  Since then he has done well, but recently on check cystoscopy I found a recurrence immediately lateral to the right ureteric orifice which is flat and extending over between 2 and 3 cm, but not involving the right ureteric orifice.  There were one or two other inflamed areas in the bladder, but no anjel tumor was seen.      DESCRIPTION OF PROCEDURE:  The patient was brought to the operative suite and after the induction of anesthesia was placed in the dorsal lithotomy position with the genitalia prepped and draped in customary fashion.       Timeout was then called.        A #24-Romansh cystoscope:  Urethra was then dilated from 24 to #30-Romansh.  A #24-Romansh cystoscope was then passed into the penile urethra.  The penile urethra was normal.  The external sphincter was intact when viewed from verumontanum.  The prostatic urethra was wide open, having previously been resected.  The interior of the bladder was carefully inspected.  The tumor could be seen easily, immediately lateral to the right  ureteric orifice, flat but papillary in appearance, and it was not immediately involving the orifice but was extending very, very close to it.  Other areas of the bladder showed very mild inflammation on the anterior surface and a small area on the left lateral wall.  With the cold cup, I then took biopsies from the anterior wall of the bladder from the area that was of concern from the posterior wall of the bladder and from the left lateral wall of the bladder.  I then withdrew the cystoscope and inserted the #26-Mauritanian continuous flow resectoscope sheath with the obturator in place.  I successfully removed the obturator and inserted the resecting element in its place.  Using a low cutting current, I then very carefully resected the tumor immediately lateral to the right ureteric orifice in its entirety and all the fragments of the tumor were then evacuated from the bladder with the [employer] evacuator and will be sent for histological examination.  I then very carefully fulgurated the entire area, extending almost right up to the right ureteric office with a ball electrode at a very low coagulating current.  The biopsy sites were also carefully coagulated.  A few other slightly suspicious areas in the bladder were also coagulated.      At the completion of the procedure, there were no residual fragments in the bladder.  I did wash the bladder once again with the [employer] evacuator.  I then withdrew the resectoscope and passed a #20-Mauritanian 3-way Blood catheter through the urethra into the bladder, inflated the balloon with about 12 mL of fluid, irrigated it gently with a 60 mL syringe, observing no bleeding.  I then plugged the irrigation port, connected the catheter to drainage, placed a 30 mg B and O suppository in the rectum, and the patient was then taken to the recovery room, having tolerated the procedure well.      CONCLUSION:  The patient will go home today with the catheter in.  It can be removed tomorrow, but I  need to see him in the office in 1 to 2 weeks to go over the pathology report, to have discussions about whether we need to consider any additional treatment at this point.         JAGJIT CONTRERAS MD             D: 2018   T: 2018   MT: KUMAR      Name:     NII MARQUIS   MRN:      [id number]        Account:        [id number]   :      1950           Procedure Date: 2018      Document: [id number]       cc: Jagjit Crockett Jr, MD

## 2018-03-08 NOTE — ANESTHESIA CARE TRANSFER NOTE
Patient: Lobo Isaacs    Procedure(s):  CYSTOSCOPY, BILATERAL RETROGRADES, TRANSURETHRAL RESECTION OF BLADDER TUMOR, BLADDER BIOPSY - Wound Class: II-Clean Contaminated   - Wound Class: II-Clean Contaminated   - Wound Class: II-Clean Contaminated    Diagnosis: HISTORY OF BLADDER CANCER  Diagnosis Additional Information: No value filed.    Anesthesia Type:   General, LMA     Note:  Airway :LMA  Patient transferred to:PACU  Comments: To same day pacu bed 6 with LMA in situ.  spont respirations. Vss. Report given to RN assuming care of pt      Vitals: (Last set prior to Anesthesia Care Transfer)    CRNA VITALS  3/8/2018 0743 - 3/8/2018 0818      3/8/2018             NIBP: 96/50    Pulse: 53    NIBP Mean: 69    SpO2: 98 %    Resp Rate (observed): 9                Electronically Signed By: DIANE Bradshaw CRNA  March 8, 2018  8:18 AM

## 2018-03-08 NOTE — ANESTHESIA PREPROCEDURE EVALUATION
Anesthesia Evaluation     . Pt has had prior anesthetic.     No history of anesthetic complications          ROS/MED HX    ENT/Pulmonary:     (+)EFREN risk factors snores loudly, , . .   (-) sleep apnea   Neurologic:       Cardiovascular:     (+) Dyslipidemia, hypertension----. : . . . :. .       METS/Exercise Tolerance:  >4 METS   Hematologic:         Musculoskeletal:         GI/Hepatic:        (-) GERD and liver disease   Renal/Genitourinary:      (-) renal disease   Endo:      (-) Type I DM   Psychiatric:         Infectious Disease:         Malignancy:   (+) Malignancy History of Other  Other CA bladder status post         Other:                     Physical Exam      Airway   Mallampati: II  TM distance: >3 FB  Neck ROM: full    Dental   (+) chipped    Cardiovascular   Rhythm and rate: regular and normal      Pulmonary    breath sounds clear to auscultation                    Anesthesia Plan      History & Physical Review  History and physical reviewed and following examination; no interval change.    ASA Status:  2 .    NPO Status:  > 8 hours    Plan for General and LMA with Intravenous induction. Maintenance will be Balanced.    PONV prophylaxis:  Ondansetron (or other 5HT-3) and Dexamethasone or Solumedrol       Postoperative Care      Consents  Anesthetic plan, risks, benefits and alternatives discussed with:  Patient..                          .

## 2018-03-08 NOTE — IP AVS SNAPSHOT
MRN:[id number]                      After Visit Summary   3/8/2018    Lobo Isaacs    MRN: [id number]           Thank you!     Thank you for choosing [facility] for your care. Our goal is always to provide you with excellent care. Hearing back from our patients is one way we can continue to improve our services. Please take a few minutes to complete the written survey that you may receive in the mail after you visit with us. Thank you!        Patient Information     Date Of Birth          1950        About your hospital stay     You were admitted on:  March 8, 2018 You last received care in the:  [facility] PACU    You were discharged on:  March 8, 2018       Who to Call     For medical emergencies, please call 911.  For non-urgent questions about your medical care, please call your primary care provider or clinic, [phone]  For questions related to your surgery, please call your surgery clinic        Attending Provider     Provider Cody Echols MD Urology       Primary Care Provider Office Phone # Fax #    Efra Jasmina Crockett [phone]      Follow-up Appointments     Follow-up and recommended labs and tests        Follow up with meCody, within 1-2 weeks. to evaluate after surgery.  The following labs/tests are recommended: Path review  SELF EFREN MUÑOZ IN AM.                  Your next 10 appointments already scheduled     Mar 15, 2018  1:40 PM CDT   Return Visit with Cody Torres MD   [facility] [place] ([facility] [place])    [address]  [address]  [facility] [id number] [phone]              Further instructions from your care team       Same Day Surgery Discharge Instructions for  Sedation and General Anesthesia       It's not unusual to feel dizzy, light-headed or faint for up to 24 hours after surgery or while taking pain medication.  If you have these symptoms:  sit for a few minutes before standing and have someone assist you when you get up to walk or use the bathroom.      You should rest and relax for the next 24 hours. We recommend you make arrangements to have an adult stay with you for at least 24 hours after your discharge.  Avoid hazardous and strenuous activity.      DO NOT DRIVE any vehicle or operate mechanical equipment for 24 hours following the end of your surgery.  Even though you may feel normal, your reactions may be affected by the medication you have received.      Do not drink alcoholic beverages for 24 hours following surgery.       Slowly progress to your regular diet as you feel able. It's not unusual to feel nauseated and/or vomit after receiving anesthesia.  If you develop these symptoms, drink clear liquids (apple juice, ginger ale, broth, 7-up, etc. ) until you feel better.  If your nausea and vomiting persists for 24 hours, please notify your surgeon.        All narcotic pain medications, along with inactivity and anesthesia, can cause constipation. Drinking plenty of liquids and increasing fiber intake will help.      For any questions of a medical nature, call your surgeon.      Do not make important decisions for 24 hours.      If you had general anesthesia, you may have a sore throat for a couple of days related to the breathing tube used during surgery.  You may use Cepacol lozenges to help with this discomfort.  If it worsens or if you develop a fever, contact your surgeon.       If you feel your pain is not well managed with the pain medications prescribed by your surgeon, please contact your surgeon's office to let them know so they can address your concerns.       DISCHARGE INSTRUCTIONS FOR   CATHETER CARE AT HOME      .      Basic Catheter Care  1. Always wash hands before and after handling your catheter.  2. Use soap and water to wash the area around your catheter.  3. Do this procedure twice a day.  4. Proper cleansing will help  keep the area from becoming irritated or infected.    Leg Bag  This is a small plastic bag that collects urine draining from your catheter and then strapped around your thigh. It will need to be emptied when the bag is 1/2 to 3/4 full.     Large Drainage Bag  1. This bag is larger than the leg bag and holds more urine.  It is to be used while at home, especially at night.    2. Before you go to bed, change the leg bag to the large drainage bag.  3. Pinch off the catheter with your fingers and swab the connection between the catheter and leg bag with alcohol sponge.  4. Disconnect the leg bag and connect the large drainage bag to your catheter.  5. When you get into bed, arrange the drainage tubing so that it doesn t kink.  6. Be sure to keep the bag below the level of your bladder and allow enough slack for turning.    Cleaning Your Drainage Bags    1. Wash hands.  2. Using funnel or syringe, fill the bag half full with a solution of 1/2  vinegar and 1/2 water.  3. Shake bag, allowing mixture to cleanse inside of bag.  4. Empty out all vinegar and water mixture from your bag.  5. Hang bag to dry when not in use.  6. Clean your bags anytime you change them.      Helpful Hints  1. Always keep drainage bags below bladder level to insure adequate drainage.  2. Drink 4-6 glasses of water daily along with other fluids you normally drink to keep urine free of infection and / or clots.  3. If you notice no urine in your bag for 2 to 4 hours or you develop extreme discomfort in bladder area, your catheter maybe plugged.  Notify your doctor.  4. If you notice your urine becomes foul smelling and cloudy, notify your doctor.  Also notify your doctor if you develop fever or chills.  5. If you notice urine leaking around the outside of the catheter, check to be sure catheter or tubing is not kinked.  6. Don t use leg bag while in bed.                HOW TO REMOVE YOUR CATHETER INSTRUCTIONS    1. Wash your hands.    2. Insert the  syringe into balloon port of the catheter.    3. Withdraw all the fluid from the balloon.  You may have to re-insert the syringe into the port additional times until no more fluid can be withdrawn.    4. Pull gently on the catheter.  If no resistance, slowly withdraw.    5. Dispose of the catheter and the syringe.    6. Wash your hands.    7. Call your doctor if unable to urinate within 6-8 hours, or when uncomfortable.       Discharge Instructions for Transurethral Resection of a Bladder Tumor        Diet:     Diet as tolerated.    Drink at least 6 glasses of liquid a day-at least 3 glasses should be water.  Activity:     Avoid heavy lifting or strenuous activity     Increase activity as directed by your surgeon     Short walks and stair climbing are OK     Showering is OK  Care after surgery:    Do not hold urine in your bladder. Always empty your bladder when you have the urge to urinate .     Do not strain to have a bowel movement. If you constipated, take an over the counter stool softener until stools become normal or soft.  This may take several weeks.     Do not drive a car or have intercourse until cleared by your doctor.  It is not unusual to pass small clots or to have red-tinged urine. If this occurs, decrease activity, and increase your fluid intake. Generally, the urine will clear of blood within a day or two.    Call your physician if you have the following signs or symptoms:    Painful urination or unable to urinate.    Loss of bladder control or increased frequency of urination.    Have chills or temperature greater than 101 F.    Excessive blood in your urine           **If you have questions or concerns about your procedure,   call Dr. Torres at [phone]**    Pending Results     Date and Time Order Name Status Description    3/8/2018 0800 XR Surgery MURPHY L/T 5 Min Fluoro w Stills In process     3/8/2018 0749 Surgical pathology exam In process             Admission Information     Date & Time  "Provider Department Dept. Phone    3/8/2018 Cody Torres MD [facility] PACU [phone]      Your Vitals Were     Blood Pressure Temperature Respirations Height Weight Pulse Oximetry    129/73 96.8  F (36  C) 12 1.74 m (5' 8.5\") 60.6 kg (133 lb 8 oz) 98%    BMI (Body Mass Index)                   20 kg/m2           [employer] Information     [employer] lets you send messages to your doctor, view your test results, renew your prescriptions, schedule appointments and more. To sign up, go to [email or web address] . Click on \"Log in\" on the left side of the screen, which will take you to the Welcome page. Then click on \"Sign up Now\" on the right side of the page.     You will be asked to enter the access code listed below, as well as some personal information. Please follow the directions to create your username and password.     Your access code is: [email or web address]  Expires: 2018  8:37 AM     Your access code will  in 90 days. If you need help or a new code, please call your [facility] clinic or [phone].        Care EveryWhere ID     This is your Care EveryWhere ID. This could be used by other organizations to access your [facility] medical records  [id number]        Equal Access to Services     NAS BARRON AH: Hadii jan snowo Sokaya, waaxda luqadaha, qaybta kaalmada john, purnima melton. So [facility] [phone].    ATENCIÓN: Si habla español, tiene a diaz disposición servicios gratuitos de asistencia lingüística. Daphne al [phone].    We comply with applicable federal civil rights laws and [place] laws. We do not discriminate on the basis of race, color, national origin, age, disability, sex, sexual orientation, or gender identity.               Review of your medicines      START taking        Dose / Directions    ciprofloxacin 250 MG tablet   Commonly known as:  CIPRO   Used for:  Personal history of malignant neoplasm of bladder        Dose:  250 mg "   Take 1 tablet (250 mg) by mouth 2 times daily for 2 doses   Quantity:  2 tablet   Refills:  0         CONTINUE these medicines which have NOT CHANGED        Dose / Directions    metoprolol succinate 50 MG 24 hr tablet   Commonly known as:  TOPROL-XL   Used for:  Hypertension goal BP (blood pressure) < 140/80        Dose:  50 mg   Take 1 tablet (50 mg) by mouth daily   Quantity:  90 tablet   Refills:  3         STOP taking     OMEGA-3 FISH OIL PO                Where to get your medicines      These medications were sent to [facility] [place], [place] - [address]  [address], [place] [address]     Phone:  [phone]     ciprofloxacin 250 MG tablet                Protect others around you: Learn how to safely use, store and throw away your medicines at www.disposemymeds.org.        ANTIBIOTIC INSTRUCTION     You've Been Prescribed an Antibiotic - Now What?  Your healthcare team thinks that you or your loved one might have an infection. Some infections can be treated with antibiotics, which are powerful, life-saving drugs. Like all medications, antibiotics have side effects and should only be used when necessary. There are some important things you should know about your antibiotic treatment.      Your healthcare team may run tests before you start taking an antibiotic.    Your team may take samples (e.g., from your blood, urine or other areas) to run tests to look for bacteria. These test can be important to determine if you need an antibiotic at all and, if you do, which antibiotic will work best.      Within a few days, your healthcare team might change or even stop your antibiotic.    Your team may start you on an antibiotic while they are working to find out what is making you sick.    Your team might change your antibiotic because test results show that a different antibiotic would be better to treat your infection.    In some cases, once your team has more information, they  learn that you do not need an antibiotic at all. They may find out that you don't have an infection, or that the antibiotic you're taking won't work against your infection. For example, an infection caused by a virus can't be treated with antibiotics. Staying on an antibiotic when you don't need it is more likely to be harmful than helpful.      You may experience side effects from your antibiotic.    Like all medications, antibiotics have side effects. Some of these can be serious.    Let you healthcare team know if you have any known allergies when you are admitted to the hospital.    One significant side effect of nearly all antibiotics is the risk of severe and sometimes deadly diarrhea caused by Clostridium difficile (C. Difficile). This occurs when a person takes antibiotics because some good germs are destroyed. Antibiotic use allows C. diificile to take over, putting patients at high risk for this serious infection.    As a patient or caregiver, it is important to understand your or your loved one's antibiotic treatment. It is especially important for caregivers to speak up when patients can't speak for themselves. Here are some important questions to ask your healthcare team.    What infection is this antibiotic treating and how do you know I have that infection?    What side effects might occur from this antibiotic?    How long will I need to take this antibiotic?    Is it safe to take this antibiotic with other medications or supplements (e.g., vitamins) that I am taking?     Are there any special directions I need to know about taking this antibiotic? For example, should I take it with food?    How will I be monitored to know whether my infection is responding to the antibiotic?    What tests may help to make sure the right antibiotic is prescribed for me?      Information provided by:  www.cdc.gov/getsmart  U.S. Department of Health and Human Services  Centers for disease Control and  Prevention  National Center for Emerging and Zoonotic Infectious Diseases  Division of Healthcare Quality Promotion             Medication List: This is a list of all your medications and when to take them. Check marks below indicate your daily home schedule. Keep this list as a reference.      Medications           Morning Afternoon Evening Bedtime As Needed    ciprofloxacin 250 MG tablet   Commonly known as:  CIPRO   Take 1 tablet (250 mg) by mouth 2 times daily for 2 doses                                metoprolol succinate 50 MG 24 hr tablet   Commonly known as:  TOPROL-XL   Take 1 tablet (50 mg) by mouth daily

## 2018-03-08 NOTE — IP AVS SNAPSHOT
[place] [address]    [place] [address]    Phone:  [phone]                                       After Visit Summary   3/8/2018    Lobo Isaacs    MRN: [id number]           After Visit Summary Signature Page     I have received my discharge instructions, and my questions have been answered. I have discussed any challenges I see with this plan with the nurse or doctor.    ..........................................................................................................................................  Patient/Patient Representative Signature      ..........................................................................................................................................  Patient Representative Print Name and Relationship to Patient    ..................................................               ................................................  Date                                            Time    ..........................................................................................................................................  Reviewed by Signature/Title    ...................................................              ..............................................  Date                                                            Time

## 2018-03-08 NOTE — ANESTHESIA POSTPROCEDURE EVALUATION
Patient: Lobo Isaacs    Procedure(s):  CYSTOSCOPY, BILATERAL RETROGRADES, TRANSURETHRAL RESECTION OF BLADDER TUMOR, BLADDER BIOPSY - Wound Class: II-Clean Contaminated   - Wound Class: II-Clean Contaminated   - Wound Class: II-Clean Contaminated    Diagnosis:HISTORY OF BLADDER CANCER  Diagnosis Additional Information: No value filed.    Anesthesia Type:  General, LMA    Note:  Anesthesia Post Evaluation    Patient location during evaluation: PACU  Patient participation: Able to fully participate in evaluation  Level of consciousness: awake  Pain management: adequate  Airway patency: patent  Cardiovascular status: acceptable  Respiratory status: acceptable  Hydration status: acceptable  PONV: none     Anesthetic complications: None          Last vitals:  Vitals:    03/08/18 0900 03/08/18 0915 03/08/18 0954   BP: 129/73 125/73 126/71   Resp: 12 16    Temp: 36  C (96.8  F) 36.2  C (97.2  F)    SpO2: 98% 97% 99%         Electronically Signed By: Gracy Sales MD, MD  March 8, 2018  1:15 PM

## 2018-03-08 NOTE — PROGRESS NOTES
Admission medication history interview status for the 3/8/2018  admission is complete. See EPIC admission navigator for prior to admission medications     Medication history source reliability:Good    Medication history interview source(s):Patient    Medication history resources (including written lists, pill bottles, clinic record):None    Primary pharmacy.[facility]    Additional medication history information not noted on PTA med list :None    Time spent in this activity: 40 minutes    Prior to Admission medications    Medication Sig Last Dose Taking? Auth Provider   metoprolol succinate (TOPROL-XL) 50 MG 24 hr tablet Take 1 tablet (50 mg) by mouth daily 3/8/2018 at 0500 Yes Efra Crockett MD   Omega-3 Fatty Acids (OMEGA-3 FISH OIL PO) Take 1,200 mg by mouth daily  2/22/2018 at AM Yes Reported, Patient

## 2018-03-09 LAB — COPATH REPORT: NORMAL

## 2018-03-13 ENCOUNTER — ALLIED HEALTH/NURSE VISIT (OUTPATIENT)
Dept: UROLOGY | Facility: CLINIC | Age: 68
End: 2018-03-13
Payer: COMMERCIAL

## 2018-03-13 DIAGNOSIS — R33.9 URINARY RETENTION WITH INCOMPLETE BLADDER EMPTYING: ICD-10-CM

## 2018-03-13 DIAGNOSIS — D49.4 BLADDER TUMOR: Primary | ICD-10-CM

## 2018-03-13 DIAGNOSIS — R31.0 GROSS HEMATURIA: Primary | ICD-10-CM

## 2018-03-13 DIAGNOSIS — D49.4 BLADDER TUMOR: ICD-10-CM

## 2018-03-13 DIAGNOSIS — C67.9 MALIGNANT NEOPLASM OF URINARY BLADDER, UNSPECIFIED SITE (H): ICD-10-CM

## 2018-03-13 LAB
ALBUMIN UR-MCNC: >=300 MG/DL
APPEARANCE UR: ABNORMAL
BILIRUB UR QL STRIP: ABNORMAL
COLOR UR AUTO: ABNORMAL
GLUCOSE UR STRIP-MCNC: 100 MG/DL
HGB UR QL STRIP: ABNORMAL
KETONES UR STRIP-MCNC: 15 MG/DL
LEUKOCYTE ESTERASE UR QL STRIP: ABNORMAL
NITRATE UR QL: NEGATIVE
PH UR STRIP: 7 PH (ref 5–7)
RESIDUAL VOLUME (RV) (EXTERNAL): 223
SOURCE: ABNORMAL
SP GR UR STRIP: 1.01 (ref 1–1.03)
UROBILINOGEN UR STRIP-ACNC: 2 EU/DL (ref 0.2–1)

## 2018-03-13 PROCEDURE — 51700 IRRIGATION OF BLADDER: CPT

## 2018-03-13 PROCEDURE — 87086 URINE CULTURE/COLONY COUNT: CPT | Performed by: UROLOGY

## 2018-03-13 PROCEDURE — 81003 URINALYSIS AUTO W/O SCOPE: CPT | Performed by: UROLOGY

## 2018-03-13 RX ORDER — TAMSULOSIN HYDROCHLORIDE 0.4 MG/1
0.4 CAPSULE ORAL DAILY
Qty: 30 CAPSULE | Refills: 0 | Status: SHIPPED | OUTPATIENT
Start: 2018-03-13 | End: 2019-02-19

## 2018-03-13 ASSESSMENT — PAIN SCALES - GENERAL: PAINLEVEL: MODERATE PAIN (4)

## 2018-03-13 NOTE — MR AVS SNAPSHOT
"              After Visit Summary   3/13/2018    Lobo Isaacs    MRN: [id number]           Patient Information     Date Of Birth          1950        Visit Information        Provider Department      3/13/2018 9:30 AM UA NURSE [facility]        Today's Diagnoses     Malignant neoplasm of urinary bladder, unspecified site (H)        Bladder tumor        Urinary retention with incomplete bladder emptying           Follow-ups after your visit        Your next 10 appointments already scheduled     Mar 15, 2018  1:40 PM CDT   Return Visit with Cody Torres MD   [facility] ([facility])    [address]  [address]  [facility] [id number] [phone]              Who to contact     If you have questions or need follow up information about today's clinic visit or your schedule please contact [facility] directly at [phone].  Normal or non-critical lab and imaging results will be communicated to you by [employer], letter or phone within 4 business days after the clinic has received the results. If you do not hear from us within 7 days, please contact the clinic through [employer] or phone. If you have a critical or abnormal lab result, we will notify you by phone as soon as possible.  Submit refill requests through [employer] or call your pharmacy and they will forward the refill request to us. Please allow 3 business days for your refill to be completed.          Additional Information About Your Visit        MyChart Information     [employer] lets you send messages to your doctor, view your test results, renew your prescriptions, schedule appointments and more. To sign up, go to [email or web address] . Click on \"Log in\" on the left side of the screen, which will take you to the Welcome page. Then click on \"Sign up Now\" on the right side of the page.     You will be asked " to enter the access code listed below, as well as some personal information. Please follow the directions to create your username and password.     Your access code is: [email or web address]  Expires: 2018  9:37 AM     Your access code will  in 90 days. If you need help or a new code, please call your [facility] clinic or [phone].        Care EveryWhere ID     This is your Care EveryWhere ID. This could be used by other organizations to access your [facility] medical records  [id number]         Blood Pressure from Last 3 Encounters:   18 126/71   18 140/82   18 138/80    Weight from Last 3 Encounters:   18 60.6 kg (133 lb 8 oz)   18 61.9 kg (136 lb 8 oz)   18 59 kg (130 lb)              We Performed the Following     MEASURE POST-VOID RESIDUAL URINE/BLADDER CAPACITY, US NON-IMAGING (94586)     UA without Microscopic          Today's Medication Changes          These changes are accurate as of 3/13/18  2:04 PM.  If you have any questions, ask your nurse or doctor.               Start taking these medicines.        Dose/Directions    tamsulosin 0.4 MG capsule   Commonly known as:  FLOMAX   Used for:  Urinary retention with incomplete bladder emptying        Dose:  0.4 mg   Take 1 capsule (0.4 mg) by mouth daily   Quantity:  30 capsule   Refills:  0            Where to get your medicines      These medications were sent to [facility] [address] [place] [address]  [address] [facility] [address]     Phone:  [phone]     tamsulosin 0.4 MG capsule                Primary Care Provider Office Phone # Fax #    Efra Crockett [phone] [address]  [facility] [address]        Equal Access to Services     NAS BARRON : Hadii jan ku hadasho Soomaali, waaxda luqadaha, qaybta kaalmada adeegyada, purnima melton. So [facility] [phone].    ATENCIÓN: Si kunal gardner diaz  disposición servicios gratuitos de asistencia lingüística. Daphne valencia [phone].    We comply with applicable federal civil rights laws and [place] laws. We do not discriminate on the basis of race, color, national origin, age, disability, sex, sexual orientation, or gender identity.            Thank you!     Thank you for choosing [facility] [place]  for your care. Our goal is always to provide you with excellent care. Hearing back from our patients is one way we can continue to improve our services. Please take a few minutes to complete the written survey that you may receive in the mail after your visit with us. Thank you!             Your Updated Medication List - Protect others around you: Learn how to safely use, store and throw away your medicines at www.disposemymeds.org.          This list is accurate as of 3/13/18  2:04 PM.  Always use your most recent med list.                   Brand Name Dispense Instructions for use Diagnosis    metoprolol succinate 50 MG 24 hr tablet    TOPROL-XL    90 tablet    Take 1 tablet (50 mg) by mouth daily    Hypertension goal BP (blood pressure) < 140/80       tamsulosin 0.4 MG capsule    FLOMAX    30 capsule    Take 1 capsule (0.4 mg) by mouth daily    Urinary retention with incomplete bladder emptying       TYLENOL PO      Take 500 mg by mouth every 4 hours as needed for mild pain or fever

## 2018-03-13 NOTE — PROGRESS NOTES
Lobo Isaacs comes into clinic today at the request of Dr. Torres for UA/UC and PVR for urinary retention.  This service provided today was under the supervising provider of the day, Dr. Hernández, who was available if needed.  Patient had surgery 3/8/18 for a bladder tumor.  Catheter removed on 3/9/18.  Was voiding well until Sunday.  Started seeing more blood on Monday.  Came in to have urine checked as well as PVR.  Midstream urine collected.  UA RESULTS:  Recent Labs   Lab Test  03/13/18   0926   12/07/17   0940   COLOR  Red   < >  Yellow   APPEARANCE  Cloudy   < >  Clear   URINEGLC  100*   < >  Negative   URINEBILI  Large*   < >  Negative   URINEKETONE  15*   < >  Negative   SG  1.010   < >  1.010   UBLD  Large*   < >  Small*   URINEPH  7.0   < >  6.0   PROTEIN  >=300*   < >  Negative   UROBILINOGEN  2.0*   < >  0.2   NITRITE  Negative   < >  Negative   LEUKEST  Large*   < >  Negative   RBCU   --    --   O - 2   WBCU   --    --   O - 2    < > = values in this interval not displayed.     Will be sent for culture.  PVR = 223 cc.  Patient is not terribly uncomfortable.  Urine is grossly bloody.  I spoke with Dr. Hernández.  Catheter to be placed and irrigate until urine is clearer.  Multiple irrigations done.  Urine color is light pink after 500 cc sterile water irrigation.  Catheter removed.  Tamsulosin sent to patient's preferred pharmacy.  Dr. Delgado does not want a catheter to stay in.  Patient will call with any questions or concerns.  Follow-up with Dr. Torres as planned.    Sarah Egan LPN

## 2018-03-14 LAB
BACTERIA SPEC CULT: NO GROWTH
Lab: NORMAL
SPECIMEN SOURCE: NORMAL

## 2018-03-15 ENCOUNTER — ANESTHESIA (OUTPATIENT)
Dept: SURGERY | Facility: CLINIC | Age: 68
End: 2018-03-15
Payer: MEDICARE

## 2018-03-15 ENCOUNTER — ANESTHESIA EVENT (OUTPATIENT)
Dept: SURGERY | Facility: CLINIC | Age: 68
End: 2018-03-15
Payer: MEDICARE

## 2018-03-15 ENCOUNTER — HOSPITAL ENCOUNTER (OUTPATIENT)
Facility: CLINIC | Age: 68
Setting detail: OBSERVATION
Discharge: HOME | End: 2018-03-16
Attending: EMERGENCY MEDICINE | Admitting: INTERNAL MEDICINE
Payer: MEDICARE

## 2018-03-15 DIAGNOSIS — R33.9 URINARY RETENTION: ICD-10-CM

## 2018-03-15 DIAGNOSIS — R31.0 GROSS HEMATURIA: Primary | ICD-10-CM

## 2018-03-15 PROBLEM — R31.9 HEMATURIA: Status: ACTIVE | Noted: 2018-03-15

## 2018-03-15 LAB
ALBUMIN UR-MCNC: 100 MG/DL
ANION GAP SERPL CALCULATED.3IONS-SCNC: 6 MMOL/L (ref 3–14)
APPEARANCE UR: ABNORMAL
BASOPHILS # BLD AUTO: 0 10E9/L (ref 0–0.2)
BASOPHILS NFR BLD AUTO: 0.2 %
BILIRUB UR QL STRIP: NEGATIVE
BUN SERPL-MCNC: 13 MG/DL (ref 7–30)
CALCIUM SERPL-MCNC: 8.1 MG/DL (ref 8.5–10.1)
CHLORIDE SERPL-SCNC: 99 MMOL/L (ref 94–109)
CO2 SERPL-SCNC: 28 MMOL/L (ref 20–32)
COLOR UR AUTO: ABNORMAL
CREAT SERPL-MCNC: 0.85 MG/DL (ref 0.66–1.25)
DIFFERENTIAL METHOD BLD: ABNORMAL
EOSINOPHIL # BLD AUTO: 0.2 10E9/L (ref 0–0.7)
EOSINOPHIL NFR BLD AUTO: 3.1 %
ERYTHROCYTE [DISTWIDTH] IN BLOOD BY AUTOMATED COUNT: 13 % (ref 10–15)
GFR SERPL CREATININE-BSD FRML MDRD: 90 ML/MIN/1.7M2
GLUCOSE SERPL-MCNC: 109 MG/DL (ref 70–99)
GLUCOSE UR STRIP-MCNC: NEGATIVE MG/DL
HCT VFR BLD AUTO: 34.8 % (ref 40–53)
HGB BLD-MCNC: 11.7 G/DL (ref 13.3–17.7)
HGB BLD-MCNC: 11.9 G/DL (ref 13.3–17.7)
HGB UR QL STRIP: ABNORMAL
IMM GRANULOCYTES # BLD: 0 10E9/L (ref 0–0.4)
IMM GRANULOCYTES NFR BLD: 0.3 %
KETONES UR STRIP-MCNC: NEGATIVE MG/DL
LEUKOCYTE ESTERASE UR QL STRIP: NEGATIVE
LYMPHOCYTES # BLD AUTO: 1.3 10E9/L (ref 0.8–5.3)
LYMPHOCYTES NFR BLD AUTO: 19.9 %
MCH RBC QN AUTO: 29.3 PG (ref 26.5–33)
MCHC RBC AUTO-ENTMCNC: 33.6 G/DL (ref 31.5–36.5)
MCV RBC AUTO: 87 FL (ref 78–100)
MONOCYTES # BLD AUTO: 0.6 10E9/L (ref 0–1.3)
MONOCYTES NFR BLD AUTO: 9.2 %
NEUTROPHILS # BLD AUTO: 4.4 10E9/L (ref 1.6–8.3)
NEUTROPHILS NFR BLD AUTO: 67.3 %
NITRATE UR QL: NEGATIVE
NRBC # BLD AUTO: 0 10*3/UL
NRBC BLD AUTO-RTO: 0 /100
PH UR STRIP: 7 PH (ref 5–7)
PLATELET # BLD AUTO: 198 10E9/L (ref 150–450)
POTASSIUM SERPL-SCNC: 4 MMOL/L (ref 3.4–5.3)
RBC # BLD AUTO: 3.99 10E12/L (ref 4.4–5.9)
RBC #/AREA URNS AUTO: >182 /HPF (ref 0–2)
SODIUM SERPL-SCNC: 133 MMOL/L (ref 133–144)
SOURCE: ABNORMAL
SP GR UR STRIP: 1.01 (ref 1–1.03)
UROBILINOGEN UR STRIP-MCNC: NORMAL MG/DL (ref 0–2)
WBC # BLD AUTO: 6.5 10E9/L (ref 4–11)
WBC #/AREA URNS AUTO: 0 /HPF (ref 0–5)

## 2018-03-15 PROCEDURE — 36415 COLL VENOUS BLD VENIPUNCTURE: CPT | Performed by: PHYSICIAN ASSISTANT

## 2018-03-15 PROCEDURE — 99207 ZZC CDG-MDM COMPONENT: MEETS LOW - DOWN CODED: CPT | Performed by: PHYSICIAN ASSISTANT

## 2018-03-15 PROCEDURE — 51702 INSERT TEMP BLADDER CATH: CPT

## 2018-03-15 PROCEDURE — 40000169 ZZH STATISTIC PRE-PROCEDURE ASSESSMENT I: Performed by: UROLOGY

## 2018-03-15 PROCEDURE — 71000012 ZZH RECOVERY PHASE 1 LEVEL 1 FIRST HR: Performed by: UROLOGY

## 2018-03-15 PROCEDURE — 25000128 H RX IP 250 OP 636: Performed by: NURSE ANESTHETIST, CERTIFIED REGISTERED

## 2018-03-15 PROCEDURE — 25000128 H RX IP 250 OP 636: Performed by: UROLOGY

## 2018-03-15 PROCEDURE — 25000132 ZZH RX MED GY IP 250 OP 250 PS 637: Mod: GY | Performed by: PHYSICIAN ASSISTANT

## 2018-03-15 PROCEDURE — 37000008 ZZH ANESTHESIA TECHNICAL FEE, 1ST 30 MIN: Performed by: UROLOGY

## 2018-03-15 PROCEDURE — 37000009 ZZH ANESTHESIA TECHNICAL FEE, EACH ADDTL 15 MIN: Performed by: UROLOGY

## 2018-03-15 PROCEDURE — 36000050 ZZH SURGERY LEVEL 2 1ST 30 MIN: Performed by: UROLOGY

## 2018-03-15 PROCEDURE — 27210995 ZZH RX 272: Performed by: UROLOGY

## 2018-03-15 PROCEDURE — 81001 URINALYSIS AUTO W/SCOPE: CPT | Performed by: EMERGENCY MEDICINE

## 2018-03-15 PROCEDURE — 85025 COMPLETE CBC W/AUTO DIFF WBC: CPT | Performed by: EMERGENCY MEDICINE

## 2018-03-15 PROCEDURE — G0378 HOSPITAL OBSERVATION PER HR: HCPCS

## 2018-03-15 PROCEDURE — 99219 ZZC INITIAL OBSERVATION CARE,LEVL II: CPT | Performed by: PHYSICIAN ASSISTANT

## 2018-03-15 PROCEDURE — 85018 HEMOGLOBIN: CPT | Mod: 91 | Performed by: PHYSICIAN ASSISTANT

## 2018-03-15 PROCEDURE — 25000125 ZZHC RX 250: Performed by: NURSE ANESTHETIST, CERTIFIED REGISTERED

## 2018-03-15 PROCEDURE — 99222 1ST HOSP IP/OBS MODERATE 55: CPT | Mod: 25 | Performed by: UROLOGY

## 2018-03-15 PROCEDURE — 87086 URINE CULTURE/COLONY COUNT: CPT | Performed by: EMERGENCY MEDICINE

## 2018-03-15 PROCEDURE — A9270 NON-COVERED ITEM OR SERVICE: HCPCS | Mod: GY | Performed by: PHYSICIAN ASSISTANT

## 2018-03-15 PROCEDURE — 25000566 ZZH SEVOFLURANE, EA 15 MIN: Performed by: UROLOGY

## 2018-03-15 PROCEDURE — 99285 EMERGENCY DEPT VISIT HI MDM: CPT | Mod: 25

## 2018-03-15 PROCEDURE — 27210794 ZZH OR GENERAL SUPPLY STERILE: Performed by: UROLOGY

## 2018-03-15 PROCEDURE — 25000128 H RX IP 250 OP 636: Performed by: ANESTHESIOLOGY

## 2018-03-15 PROCEDURE — 36000052 ZZH SURGERY LEVEL 2 EA 15 ADDTL MIN: Performed by: UROLOGY

## 2018-03-15 PROCEDURE — 80048 BASIC METABOLIC PNL TOTAL CA: CPT | Performed by: EMERGENCY MEDICINE

## 2018-03-15 PROCEDURE — 52234 CYSTOSCOPY AND TREATMENT: CPT | Performed by: UROLOGY

## 2018-03-15 RX ORDER — TAMSULOSIN HYDROCHLORIDE 0.4 MG/1
0.4 CAPSULE ORAL DAILY
Status: DISCONTINUED | OUTPATIENT
Start: 2018-03-15 | End: 2018-03-16 | Stop reason: HOSPADM

## 2018-03-15 RX ORDER — SODIUM CHLORIDE, SODIUM LACTATE, POTASSIUM CHLORIDE, CALCIUM CHLORIDE 600; 310; 30; 20 MG/100ML; MG/100ML; MG/100ML; MG/100ML
INJECTION, SOLUTION INTRAVENOUS CONTINUOUS
Status: DISCONTINUED | OUTPATIENT
Start: 2018-03-15 | End: 2018-03-16 | Stop reason: HOSPADM

## 2018-03-15 RX ORDER — ONDANSETRON 4 MG/1
4 TABLET, ORALLY DISINTEGRATING ORAL EVERY 30 MIN PRN
Status: DISCONTINUED | OUTPATIENT
Start: 2018-03-15 | End: 2018-03-15 | Stop reason: HOSPADM

## 2018-03-15 RX ORDER — ACETAMINOPHEN 500 MG
500 TABLET ORAL EVERY 4 HOURS PRN
Status: DISCONTINUED | OUTPATIENT
Start: 2018-03-15 | End: 2018-03-16 | Stop reason: HOSPADM

## 2018-03-15 RX ORDER — FENTANYL CITRATE 50 UG/ML
25-50 INJECTION, SOLUTION INTRAMUSCULAR; INTRAVENOUS
Status: DISCONTINUED | OUTPATIENT
Start: 2018-03-15 | End: 2018-03-15 | Stop reason: HOSPADM

## 2018-03-15 RX ORDER — SODIUM CHLORIDE, SODIUM LACTATE, POTASSIUM CHLORIDE, CALCIUM CHLORIDE 600; 310; 30; 20 MG/100ML; MG/100ML; MG/100ML; MG/100ML
INJECTION, SOLUTION INTRAVENOUS CONTINUOUS
Status: DISCONTINUED | OUTPATIENT
Start: 2018-03-15 | End: 2018-03-15 | Stop reason: HOSPADM

## 2018-03-15 RX ORDER — ONDANSETRON 2 MG/ML
4 INJECTION INTRAMUSCULAR; INTRAVENOUS EVERY 6 HOURS PRN
Status: DISCONTINUED | OUTPATIENT
Start: 2018-03-15 | End: 2018-03-16 | Stop reason: HOSPADM

## 2018-03-15 RX ORDER — NALOXONE HYDROCHLORIDE 0.4 MG/ML
.1-.4 INJECTION, SOLUTION INTRAMUSCULAR; INTRAVENOUS; SUBCUTANEOUS
Status: DISCONTINUED | OUTPATIENT
Start: 2018-03-15 | End: 2018-03-15

## 2018-03-15 RX ORDER — ONDANSETRON 4 MG/1
4 TABLET, ORALLY DISINTEGRATING ORAL EVERY 6 HOURS PRN
Status: DISCONTINUED | OUTPATIENT
Start: 2018-03-15 | End: 2018-03-16 | Stop reason: HOSPADM

## 2018-03-15 RX ORDER — POLYETHYLENE GLYCOL 3350 17 G/17G
17 POWDER, FOR SOLUTION ORAL DAILY PRN
Status: DISCONTINUED | OUTPATIENT
Start: 2018-03-15 | End: 2018-03-16 | Stop reason: HOSPADM

## 2018-03-15 RX ORDER — ONDANSETRON 2 MG/ML
4 INJECTION INTRAMUSCULAR; INTRAVENOUS EVERY 30 MIN PRN
Status: DISCONTINUED | OUTPATIENT
Start: 2018-03-15 | End: 2018-03-15 | Stop reason: HOSPADM

## 2018-03-15 RX ORDER — LIDOCAINE 40 MG/G
CREAM TOPICAL
Status: DISCONTINUED | OUTPATIENT
Start: 2018-03-15 | End: 2018-03-16 | Stop reason: HOSPADM

## 2018-03-15 RX ORDER — HYDROMORPHONE HYDROCHLORIDE 1 MG/ML
.3-.5 INJECTION, SOLUTION INTRAMUSCULAR; INTRAVENOUS; SUBCUTANEOUS EVERY 5 MIN PRN
Status: DISCONTINUED | OUTPATIENT
Start: 2018-03-15 | End: 2018-03-15 | Stop reason: HOSPADM

## 2018-03-15 RX ORDER — NALOXONE HYDROCHLORIDE 0.4 MG/ML
.1-.4 INJECTION, SOLUTION INTRAMUSCULAR; INTRAVENOUS; SUBCUTANEOUS
Status: DISCONTINUED | OUTPATIENT
Start: 2018-03-15 | End: 2018-03-16 | Stop reason: HOSPADM

## 2018-03-15 RX ORDER — CEFAZOLIN SODIUM 2 G/100ML
2 INJECTION, SOLUTION INTRAVENOUS
Status: COMPLETED | OUTPATIENT
Start: 2018-03-15 | End: 2018-03-15

## 2018-03-15 RX ORDER — FENTANYL CITRATE 50 UG/ML
INJECTION, SOLUTION INTRAMUSCULAR; INTRAVENOUS PRN
Status: DISCONTINUED | OUTPATIENT
Start: 2018-03-15 | End: 2018-03-15

## 2018-03-15 RX ORDER — ACETAMINOPHEN 325 MG/1
650 TABLET ORAL EVERY 4 HOURS PRN
Status: DISCONTINUED | OUTPATIENT
Start: 2018-03-15 | End: 2018-03-16 | Stop reason: HOSPADM

## 2018-03-15 RX ORDER — TRAMADOL HYDROCHLORIDE 50 MG/1
50 TABLET ORAL EVERY 6 HOURS PRN
Status: DISCONTINUED | OUTPATIENT
Start: 2018-03-15 | End: 2018-03-16 | Stop reason: HOSPADM

## 2018-03-15 RX ORDER — METOPROLOL SUCCINATE 50 MG/1
50 TABLET, EXTENDED RELEASE ORAL DAILY
Status: DISCONTINUED | OUTPATIENT
Start: 2018-03-15 | End: 2018-03-16 | Stop reason: HOSPADM

## 2018-03-15 RX ORDER — PROPOFOL 10 MG/ML
INJECTION, EMULSION INTRAVENOUS PRN
Status: DISCONTINUED | OUTPATIENT
Start: 2018-03-15 | End: 2018-03-15

## 2018-03-15 RX ORDER — MEPERIDINE HYDROCHLORIDE 25 MG/ML
12.5 INJECTION INTRAMUSCULAR; INTRAVENOUS; SUBCUTANEOUS EVERY 5 MIN PRN
Status: DISCONTINUED | OUTPATIENT
Start: 2018-03-15 | End: 2018-03-15 | Stop reason: HOSPADM

## 2018-03-15 RX ORDER — CEFAZOLIN SODIUM 1 G
1 VIAL (EA) INJECTION SEE ADMIN INSTRUCTIONS
Status: DISCONTINUED | OUTPATIENT
Start: 2018-03-15 | End: 2018-03-15 | Stop reason: HOSPADM

## 2018-03-15 RX ORDER — PROCHLORPERAZINE 25 MG
12.5 SUPPOSITORY, RECTAL RECTAL EVERY 12 HOURS PRN
Status: DISCONTINUED | OUTPATIENT
Start: 2018-03-15 | End: 2018-03-16 | Stop reason: HOSPADM

## 2018-03-15 RX ORDER — AMOXICILLIN 250 MG
1 CAPSULE ORAL 2 TIMES DAILY PRN
Status: DISCONTINUED | OUTPATIENT
Start: 2018-03-15 | End: 2018-03-16 | Stop reason: HOSPADM

## 2018-03-15 RX ORDER — KETOROLAC TROMETHAMINE 15 MG/ML
15 INJECTION, SOLUTION INTRAMUSCULAR; INTRAVENOUS
Status: DISCONTINUED | OUTPATIENT
Start: 2018-03-15 | End: 2018-03-15 | Stop reason: HOSPADM

## 2018-03-15 RX ORDER — PROCHLORPERAZINE MALEATE 5 MG
5 TABLET ORAL EVERY 6 HOURS PRN
Status: DISCONTINUED | OUTPATIENT
Start: 2018-03-15 | End: 2018-03-16 | Stop reason: HOSPADM

## 2018-03-15 RX ORDER — LIDOCAINE HYDROCHLORIDE 20 MG/ML
INJECTION, SOLUTION INFILTRATION; PERINEURAL PRN
Status: DISCONTINUED | OUTPATIENT
Start: 2018-03-15 | End: 2018-03-15

## 2018-03-15 RX ORDER — AMOXICILLIN 250 MG
2 CAPSULE ORAL 2 TIMES DAILY PRN
Status: DISCONTINUED | OUTPATIENT
Start: 2018-03-15 | End: 2018-03-16 | Stop reason: HOSPADM

## 2018-03-15 RX ORDER — ACETAMINOPHEN 650 MG/1
650 SUPPOSITORY RECTAL EVERY 4 HOURS PRN
Status: DISCONTINUED | OUTPATIENT
Start: 2018-03-15 | End: 2018-03-16 | Stop reason: HOSPADM

## 2018-03-15 RX ORDER — ACETAMINOPHEN 325 MG/1
650 TABLET ORAL EVERY 4 HOURS PRN
Qty: 100 TABLET | Refills: 0 | Status: SHIPPED | OUTPATIENT
Start: 2018-03-15 | End: 2018-03-16

## 2018-03-15 RX ORDER — DEXAMETHASONE SODIUM PHOSPHATE 4 MG/ML
INJECTION, SOLUTION INTRA-ARTICULAR; INTRALESIONAL; INTRAMUSCULAR; INTRAVENOUS; SOFT TISSUE PRN
Status: DISCONTINUED | OUTPATIENT
Start: 2018-03-15 | End: 2018-03-15

## 2018-03-15 RX ADMIN — PHENYLEPHRINE HYDROCHLORIDE 100 MCG: 10 INJECTION INTRAVENOUS at 12:38

## 2018-03-15 RX ADMIN — MIDAZOLAM 2 MG: 1 INJECTION INTRAMUSCULAR; INTRAVENOUS at 12:28

## 2018-03-15 RX ADMIN — CEFAZOLIN SODIUM 2 G: 2 INJECTION, SOLUTION INTRAVENOUS at 12:37

## 2018-03-15 RX ADMIN — DEXAMETHASONE SODIUM PHOSPHATE 4 MG: 4 INJECTION, SOLUTION INTRA-ARTICULAR; INTRALESIONAL; INTRAMUSCULAR; INTRAVENOUS; SOFT TISSUE at 12:39

## 2018-03-15 RX ADMIN — FENTANYL CITRATE 50 MCG: 50 INJECTION, SOLUTION INTRAMUSCULAR; INTRAVENOUS at 12:32

## 2018-03-15 RX ADMIN — PROPOFOL 160 MG: 10 INJECTION, EMULSION INTRAVENOUS at 12:32

## 2018-03-15 RX ADMIN — FENTANYL CITRATE 25 MCG: 50 INJECTION, SOLUTION INTRAMUSCULAR; INTRAVENOUS at 13:02

## 2018-03-15 RX ADMIN — PHENYLEPHRINE HYDROCHLORIDE 100 MCG: 10 INJECTION INTRAVENOUS at 12:43

## 2018-03-15 RX ADMIN — SODIUM CHLORIDE, POTASSIUM CHLORIDE, SODIUM LACTATE AND CALCIUM CHLORIDE: 600; 310; 30; 20 INJECTION, SOLUTION INTRAVENOUS at 11:40

## 2018-03-15 RX ADMIN — LIDOCAINE HYDROCHLORIDE 60 MG: 20 INJECTION, SOLUTION INFILTRATION; PERINEURAL at 12:32

## 2018-03-15 RX ADMIN — PHENYLEPHRINE HYDROCHLORIDE 100 MCG: 10 INJECTION INTRAVENOUS at 12:52

## 2018-03-15 RX ADMIN — FENTANYL CITRATE 25 MCG: 50 INJECTION, SOLUTION INTRAMUSCULAR; INTRAVENOUS at 12:51

## 2018-03-15 RX ADMIN — SODIUM CHLORIDE, POTASSIUM CHLORIDE, SODIUM LACTATE AND CALCIUM CHLORIDE: 600; 310; 30; 20 INJECTION, SOLUTION INTRAVENOUS at 14:52

## 2018-03-15 RX ADMIN — METOPROLOL SUCCINATE 50 MG: 50 TABLET, EXTENDED RELEASE ORAL at 15:03

## 2018-03-15 ASSESSMENT — ENCOUNTER SYMPTOMS
FEVER: 0
DIZZINESS: 0
SHORTNESS OF BREATH: 0
DIFFICULTY URINATING: 1
ABDOMINAL PAIN: 0
LIGHT-HEADEDNESS: 0
CHILLS: 0

## 2018-03-15 NOTE — OP NOTE
Procedure Date: 03/15/2018      SURGEON:  Cody Torres MD      ANESTHESIA:  General anesthesia.      PREOPERATIVE DIAGNOSIS:  Gross hematuria with clot retention.      POSTOPERATIVE DIAGNOSIS:  Gross hematuria with clot retention.      PROCEDURE:  Cystoscopy, clot evacuation, fulguration of bleeding areas in bladder.      INDICATIONS:  Please see preoperative consultation.      DESCRIPTION OF PROCEDURE:  The patient was brought to the operative suite and after the induction of anesthesia, was placed in the dorsal lithotomy position with the genitalia prepped and draped in customary fashion.  Timeout was then called.        The 24 Wallisian cystoscope was then passed into the penile urethra.  The penile urethra was normal.  The external sphincter was intact when viewed from the verumontanum.  Prostatic urethra showed mild hyperplasia but was otherwise open.  The interior of the bladder was entered without difficulty.  A significant number of blood clots could be seen in the bladder with the cystoscope and therefore, using the glass piston syringe, I was able to evacuate all the clots from the bladder without difficulty.  I then reinspected the bladder, noticing that the area where the resection of the main tumor had been performed was particularly hemorrhagic.  In addition, one of the biopsy sites on the left lateral wall also appeared particularly hemorrhagic.  I removed the cystoscope, dilated the urethra to 28 Wallisian with a curved Mary sound and inserted the 26 resectoscope through the urethra into the bladder and inserted the resecting element with the small ball electrode in place.  At a low coagulating current of 30, I then carefully fulgurated every single area in the bladder where previous surgery had been performed or appeared hemorrhagic until I completed to my satisfaction.  I took particular care, as the large tumor on the right side of the bladder was quite close to the right ureteric orifice, not to  injure the right ureteric orifice in way.  At the completion of the procedure, there was absolutely no bleeding.  There was no sign of any more hemorrhagic areas and therefore I withdrew the cystoscope and inserted a 22 Luxembourgish 2-way Blood catheter through the urethra into the bladder and inflated the balloon with about 10 mL of fluid, connected to drainage, and the patient was then taken to the recovery room, having tolerated the procedure well.      CONCLUSION:  The patient will stay this afternoon.  We will evaluate his progress.  He may stay tonight, or if he is doing extremely well, we might let him go home this evening.         CODY CONTRERAS MD             D: 03/15/2018   T: 03/15/2018   MT: GAYATRI      Name:     NII MARQUIS   MRN:      [id number]        Account:        [id number]   :      1950           Procedure Date: 03/15/2018      Document: [id number]       cc: Cody Contreras MD

## 2018-03-15 NOTE — IP AVS SNAPSHOT
MRN:[id number]                      After Visit Summary   3/15/2018    Lobo Isaacs    MRN: [id number]           Thank you!     Thank you for choosing [facility] for your care. Our goal is always to provide you with excellent care. Hearing back from our patients is one way we can continue to improve our services. Please take a few minutes to complete the written survey that you may receive in the mail after you visit with us. Thank you!        Patient Information     Date Of Birth          1950        About your hospital stay     You were [facility] on:  March 15, 2018 [facility]    You were discharged on:  March 16, 2018        Reason for your hospital stay       You were hospitalized for further evaluation and treatment of urinary retention and gross hematuria.                  Who to Call     For medical emergencies, please call 911.  For non-urgent questions about your medical care, please call your primary care provider or clinic, [phone]  For questions related to your surgery, please call your surgery clinic        Attending Provider     Provider Specialty    Victorino Daily DO Emergency Medicine    Cody Torres MD Urology    Citlaly Gould MD Internal Medicine       Primary Care Provider Office Phone # Fax #    Efra Jasmina Crockett [phone]      After Care Instructions     Activity       Your activity upon discharge: activity as tolerated            Diet       Follow this diet upon discharge: Regular diet            Discharge Instructions       1) Follow-up with your primary care provider in the next week to discuss hospitalization   2) Follow-up with Urology as scheduled to discuss hospitalization and follow-up on biopsy results   3) Wife to remove Blood catheter on 3/19, syringe provided at discharge   4) Continue with Flomax until Urology follow-up            No lifting        No  "lifting over 10  lbs and no strenuous physical activity for 3 weeks                  Follow-up Appointments     Follow-up and recommended labs and tests        Follow up with primary care provider, EVERTON TRIPLETT, within 7 days for hospital follow- up.  No follow up labs or test are needed.    Follow up with Urology as scheduled, they will contact you with appointment time. Please call clinic if you don't hear from them in the next 48 hours.                  Your next 10 appointments already scheduled     Apr 03, 2018  2:20 PM CDT   Return Visit with Cody Torres MD   [facility] [place] ([facility] [place])    [address]  [address]  [facility] [address]   [phone]              Pending Results     No orders found for last 3 day(s).            Statement of Approval     Ordered          03/16/18 1100  I have reviewed and agree with all the recommendations and orders detailed in this document.  EFFECTIVE NOW     Approved and electronically signed by:  Laura Boone PA-C             Admission Information     Date & Time Provider Department Dept. Phone    3/15/2018 Citlaly Gould MD [facility] [phone]      Your Vitals Were     Blood Pressure Pulse Temperature Respirations Height Weight    152/55 70 98.8  F (37.1  C) (Oral) 16 1.727 m (5' 8\") 63 kg (139 lb)    Pulse Oximetry BMI (Body Mass Index)                99% 21.13 kg/m2          [employer] Information     [employer] lets you send messages to your doctor, view your test results, renew your prescriptions, schedule appointments and more. To sign up, go to [email or web address] . Click on \"Log in\" on the left side of the screen, which will take you to the Welcome page. Then click on \"Sign up Now\" on the right side of the page.     You will be asked to enter the access code listed below, as well as some personal information. Please follow the directions to create your " username and password.     Your access code is: [email or web address]  Expires: 2018  9:37 AM     Your access code will  in 90 days. If you need help or a new code, please call your [facility] clinic or [phone].        Care EveryWhere ID     This is your Care EveryWhere ID. This could be used by other organizations to access your [facility] medical records  [id number]        Equal Access to Services     NAS BARRON : Hadii jan ku hadasho Soomaali, waaxda luqadaha, qaybta kaalmada adeegyada, waxay latricein haykatien adececelia inmanestherabigail stubbs . So [facility] [phone].    ATENCIÓN: Si habla español, tiene a diaz disposición servicios gratuitos de asistencia lingüística. Llame al [phone].    We comply with applicable federal civil rights laws and [place] laws. We do not discriminate on the basis of race, color, national origin, age, disability, sex, sexual orientation, or gender identity.               Review of your medicines      CONTINUE these medicines which have NOT CHANGED        Dose / Directions    metoprolol succinate 50 MG 24 hr tablet   Commonly known as:  TOPROL-XL   Used for:  Hypertension goal BP (blood pressure) < 140/80        Dose:  50 mg   Take 1 tablet (50 mg) by mouth daily   Quantity:  90 tablet   Refills:  3       tamsulosin 0.4 MG capsule   Commonly known as:  FLOMAX   Used for:  Urinary retention with incomplete bladder emptying        Dose:  0.4 mg   Take 1 capsule (0.4 mg) by mouth daily   Quantity:  30 capsule   Refills:  0       TYLENOL PO        Dose:  500 mg   Take 500 mg by mouth every 4 hours as needed for mild pain or fever   Refills:  0                Protect others around you: Learn how to safely use, store and throw away your medicines at www.disposemymeds.org.             Medication List: This is a list of all your medications and when to take them. Check marks below indicate your daily home schedule. Keep this list as a reference.      Medications           Morning Afternoon Evening  Bedtime As Needed    metoprolol succinate 50 MG 24 hr tablet   Commonly known as:  TOPROL-XL   Take 1 tablet (50 mg) by mouth daily   Last time this was given:  50 mg on 3/16/2018  9:00 AM                                   tamsulosin 0.4 MG capsule   Commonly known as:  FLOMAX   Take 1 capsule (0.4 mg) by mouth daily   Last time this was given:  0.4 mg on 3/16/2018  9:00 AM                                   TYLENOL PO   Take 500 mg by mouth every 4 hours as needed for mild pain or fever

## 2018-03-15 NOTE — ANESTHESIA PREPROCEDURE EVALUATION
Procedure: Procedure(s):  COMBINED CYSTOSCOPY, FULGURATE BLEEDERS, EVACUATE CLOT(S)  Preop diagnosis: GROSS HEMATURIA.    No Known Allergies  Past Medical History:   Diagnosis Date     Anemia      Arthritis      Bladder cancer (H)      Hearing loss      History of blood transfusion 3/2015    bladder surgery(2 Units)     Hyperlipidemia      Hypertension      Mumps      Past Surgical History:   Procedure Laterality Date     COLONOSCOPY       CYSTOSCOPY       CYSTOSCOPY, BIOPSY BLADDER, COMBINED N/A 3/8/2018    Procedure: COMBINED CYSTOSCOPY, BIOPSY BLADDER;;  Surgeon: Cody Torres MD;  Location:  OR     CYSTOSCOPY, CYSTOGRAM, COMBINED N/A 3/2/2015    Procedure: COMBINED CYSTOSCOPY, CYSTOGRAM;  Surgeon: Cody Torres MD;  Location:  OR     CYSTOSCOPY, FULGURATE BLEEDERS, EVACUATE CLOT(S), COMBINED N/A 3/2/2015    Procedure: COMBINED CYSTOSCOPY, FULGURATE BLEEDERS, EVACUATE CLOT(S);  Surgeon: Cody Torres MD;  Location:  OR     CYSTOSCOPY, RETROGRADES, COMBINED  3/2/2015    Procedure: COMBINED CYSTOSCOPY, RETROGRADES;  Surgeon: Cody Torres MD;  Location:  OR     CYSTOSCOPY, RETROGRADES, COMBINED  7/7/2015    Procedure: COMBINED CYSTOSCOPY, RETROGRADES;  Surgeon: Cody Torres MD;  Location:  OR     CYSTOSCOPY, RETROGRADES, COMBINED Bilateral 3/8/2018    Procedure: COMBINED CYSTOSCOPY, RETROGRADES;  CYSTOSCOPY, BILATERAL RETROGRADES, TRANSURETHRAL RESECTION OF BLADDER TUMOR, BLADDER BIOPSY;  Surgeon: Cody Torres MD;  Location:  OR     CYSTOSCOPY, TRANSURETHRAL RESECTION (TUR) TUMOR BLADDER, COMBINED N/A 3/2/2015    Procedure: COMBINED CYSTOSCOPY, TRANSURETHRAL RESECTION (TUR) TUMOR BLADDER;  Surgeon: Cody Torres MD;  Location:  OR     CYSTOSCOPY, TRANSURETHRAL RESECTION (TUR) TUMOR BLADDER, COMBINED N/A 7/7/2015    Procedure: COMBINED CYSTOSCOPY, TRANSURETHRAL RESECTION (TUR) TUMOR BLADDER;  Surgeon: Cody Torres MD;  Location:  [facility]     CYSTOSCOPY, TRANSURETHRAL RESECTION (TUR) TUMOR BLADDER, COMBINED N/A 3/8/2018    Procedure: COMBINED CYSTOSCOPY, TRANSURETHRAL RESECTION (TUR) TUMOR BLADDER;;  Surgeon: Cody Torres MD;  Location: [facility]     HERNIORRHAPHY INGUINAL Right 9/4/2015    Procedure: HERNIORRHAPHY INGUINAL;  Surgeon: Levi Warren MD;  [facility]     LAPAROSCOPIC HERNIORRHAPHY INGUINAL Right 9/4/2015    Procedure: LAPAROSCOPIC HERNIORRHAPHY INGUINAL;  Surgeon: Levi Warren MD;  Location:  [facility]     Social History   Substance Use Topics     Smoking status: Former Smoker     Smokeless tobacco: Never Used      Comment: SMOKED BRIEFLY IN HIS 20'S     Alcohol use No     Prior to Admission medications    Medication Sig Start Date End Date Taking? Authorizing Provider   Acetaminophen (TYLENOL PO) Take 500 mg by mouth every 4 hours as needed for mild pain or fever   Yes Reported, Patient   tamsulosin (FLOMAX) 0.4 MG capsule Take 1 capsule (0.4 mg) by mouth daily 3/13/18  Yes Cody Torres MD   metoprolol succinate (TOPROL-XL) 50 MG 24 hr tablet Take 1 tablet (50 mg) by mouth daily 1/12/18  Yes Efra Crockett MD     Current Facility-Administered Medications Ordered in Epic   Medication Dose Route Frequency Last Rate Last Dose     ceFAZolin (ANCEF) 1g in 10 mL SWFI premix for IVP  1 g Intravenous See Admin Instructions         ceFAZolin (ANCEF) intermittent infusion 2 g in 100 mL dextrose PRE-MIX  2 g Intravenous Pre-Op/Pre-procedure x 1 dose         lactated ringers infusion   Intravenous Continuous 25 mL/hr at 03/15/18 1140       No current [facility]-ordered outpatient prescriptions on file.       lactated ringers 25 mL/hr at 03/15/18 1140     Wt Readings from Last 1 Encounters:   03/15/18 63 kg (139 lb)     Temp Readings from Last 1 Encounters:   03/15/18 36.7  C (98  F) (Oral)     BP Readings from Last 6 Encounters:   03/15/18 133/70   03/08/18 126/71   03/06/18 140/82   02/22/18 138/80    12/15/17 130/78   12/07/17 120/80     Pulse Readings from Last 4 Encounters:   03/15/18 81   03/06/18 66   02/22/18 70   12/15/17 74     Resp Readings from Last 1 Encounters:   03/15/18 16     SpO2 Readings from Last 1 Encounters:   03/15/18 98%     Recent Labs   Lab Test  03/15/18   0645  03/06/18   0923   NA  133  137   POTASSIUM  4.0  4.6   CHLORIDE  99  103   CO2  28  30   ANIONGAP  6  4   GLC  109*  99   BUN  13  15   CR  0.85  0.92   KARLA  8.1*  8.8     Recent Labs   Lab Test  10/06/16   0916  05/28/15   0946  03/12/15   0606   AST  20  21  13   ALT  21  19  18   ALKPHOS  67   --   49   BILITOTAL  1.1   --   0.4     Recent Labs   Lab Test  03/15/18   0645  12/07/17   0935   WBC  6.5  6.5   HGB  11.7*  14.2   PLT  198  224     Recent Labs   Lab Test  03/11/15   1745   ABO  O   RH   Pos     Recent Labs   Lab Test  03/02/15   1540   INR  1.09      No results for input(s): TROPI in the last 97731 hours.  No results for input(s): PH, PCO2, PO2, HCO3 in the last 56675 hours.  No results for input(s): HCG in the last 87283 hours.  Recent Results (from the past 744 hour(s))   CT Abdomen Pelvis Hematuria w/o & w Contrast    Narrative    CT ABDOMEN/PELVIS HEMATURIA WITH AND WITHOUT IV CONTRAST March 5, 2018  2:58 PM     HISTORY: Malignant neoplasm of urinary bladder, unspecified site (H).    TECHNIQUE: 100mL Isovue-370. Radiation dose for this scan was reduced  using automated exposure control, adjustment of the mA and/or kV  according to patient size, or iterative reconstruction technique.    COMPARISON: 3/10/2015.    FINDINGS: There is slight high density of the renal pyramids  bilaterally, likely a variant of normal. No urinary tract calculi are  demonstrated. There is symmetric enhancement and excretion of contrast  from both kidneys. No evidence of solid renal mass. There is a filling  defect in the urinary bladder adjacent to the right ureterovesical  junction that measures 1.3 cm (series 7, image 70).    A small  calcified gallstone is noted. No CT evidence of cholecystitis.  The liver, spleen, pancreas, and adrenal glands are unremarkable. No  abdominal or retroperitoneal adenopathy. No pelvic lymphadenopathy. No  free fluid in the pelvis. No suspicious bony lesions.      Impression    IMPRESSION:  1. There is a filling defect in the urinary bladder adjacent to the  right ureterovesical junction that could represent a mass or a blood  clot. Correlate with cystoscopy. No other cause for hematuria  demonstrated.  2. Cholelithiasis.    JI YIP MD   XR Chest 2 Views    Narrative    CHEST TWO VIEWS   3/6/2018 8:42 AM     HISTORY:  Preop general physical exam.    COMPARISON: 2/26/2015.    FINDINGS: The heart size is normal. The thoracic aorta is calcified  and mildly tortuous. The lungs are mildly hyperinflated but clear. No  pneumothorax or pleural effusion. Degenerative disease in the thoracic  spine.       Impression    IMPRESSION: No acute abnormality.    KIMMIE QUIROS MD   XR Surgery MURPHY L/T 5 Min Fluoro w Stills    Narrative    SURGERY C-ARM FLUOROSCOPY LESS THAN FIVE MINUTES WITH STILLS  3/8/2018  7:45 AM     COMPARISON: 7/7/2015    HISTORY: Bilateral retrogrades.    NUMBER OF IMAGES ACQUIRED: 2    VIEWS: 2    FLUOROSCOPY TIME: .2 minutes.      Impression    IMPRESSION: Normal bilateral retrograde pyelograms. Bifid left renal  collecting system. No change.    ARIAN MANE MD       RECENT LABS:   ECG:   ECHO:       Anesthesia Evaluation     . Pt has had prior anesthetic.     No history of anesthetic complications          ROS/MED HX    ENT/Pulmonary:     (+)EFREN risk factors snores loudly, , . .   (-) sleep apnea   Neurologic:       Cardiovascular:     (+) Dyslipidemia, hypertension----. : . . . :. .       METS/Exercise Tolerance:  >4 METS   Hematologic:     (+) Anemia, -      Musculoskeletal:   (+) arthritis, , , -       GI/Hepatic:        (-) GERD and liver disease   Renal/Genitourinary:      (-) renal  disease   Endo:      (-) Type I DM   Psychiatric:         Infectious Disease:         Malignancy:   (+) Malignancy History of Other  Other CA bladder status post         Other:                     Physical Exam      Airway   Mallampati: II  TM distance: >3 FB  Neck ROM: full    Dental   (+) chipped    Cardiovascular   Rhythm and rate: regular and normal      Pulmonary    breath sounds clear to auscultation                        Anesthesia Plan      History & Physical Review  History and physical reviewed and following examination; no interval change.    ASA Status:  2 .    NPO Status:  > 8 hours    Plan for General and LMA with Intravenous induction. Maintenance will be Balanced.    PONV prophylaxis:  Ondansetron (or other 5HT-3) and Dexamethasone or Solumedrol       Postoperative Care      Consents  Anesthetic plan, risks, benefits and alternatives discussed with:  Patient..                          .

## 2018-03-15 NOTE — ED PROVIDER NOTES
"  History     Chief Complaint:  Urinary Retention    HPI   Lobo Isaacs is a 67 year old male, on Flomax, with a history of bladder cancer s/p recent cystoscopy and tumor removal of the bladder who presents for evaluation of urinary retention. He reports issues voiding secondary to having his bladder irrigated two days ago. His difficulty urinating began last night, but he has felt increased pressure and discomfort on his bladder. He was able to void some urine early on, but comments that \"it just quit\" after that. He denies fevers, chills, chest pain, shortness of breath, leg swelling, rash, lightheadedness, dizziness, and other abdominal pain.  Lobo is currently not anticoagulated.    Allergies:  No Known Drug Allergies     Medications:    Flomax   Toprol - XL      Past Medical History:    Anemia  Arthritis  Bladder cancer  Hearing loss  History of blood transfusion  Hyperlipidemia   Hypertension   Mumps   Microscopic hematuria  Bladder tumor  Bladder abscess     Past Surgical History:    Colonoscopy  Cystoscopy   Cystoscopy, Biopsy Bladder, Combined x2  Cystoscopy, Fulgurate Bleeders, Evacuate Clots, Combined  Cystoscopy retrogrades, combined x3  Cystoscopy, Transurethral resection (TUR), Tumor Bladder, Combined x3  Herniorrhaphy inguinal x2    Family History:    Heart disease - Mother  Lung cancer - Father  Colorectal cancer - Sister     Social History:  The patient was accompanied to the ED by his wife.  Smoking Status: Former  Smokeless Tobacco: Never  Alcohol Use: No   Marital Status:        Review of Systems   Constitutional: Negative for chills and fever.   Respiratory: Negative for shortness of breath.    Cardiovascular: Negative for chest pain and leg swelling.   Gastrointestinal: Negative for abdominal pain.   Genitourinary: Positive for difficulty urinating and urgency.   Skin: Negative for rash.   Neurological: Negative for dizziness and light-headedness.   All other systems reviewed and are " "negative.    Physical Exam     Patient Vitals for the past 24 hrs:   BP Temp Temp src Pulse Resp SpO2 Height Weight   03/15/18 0931 - - - - - 98 % - -   03/15/18 0930 129/71 - - - - - - -   03/15/18 0608 154/75 98  F (36.7  C) Oral 81 16 99 % 1.727 m (5' 8\") 63 kg (139 lb)        Physical Exam  General: Patient in mild distress.  Alert and cooperative with exam. Normal mentation  HEENT: NC/AT. Conjunctiva without injection or scleral icterus. External ears normal.  Respiratory: Breathing comfortably on room air  CV: Normal rate, all extremities well perfused  GI:  Non-distended abdomen.  Mild suprapubic tenderness/bladder distention  : Normal external genital exam. no evidence of infection. Uncircumcised. Blood in place. Grossly bloody output. No testicular pain or swelling.   Skin: Warm, dry, no rashes/open wounds on exposed skin  Musculoskeletal: No obvious deformities  Neuro: Alert, answers questions appropriately. No gross motor deficits    Emergency Department Course     Laboratory:  Laboratory findings were communicated with the patient and family who voiced understanding of the findings.    CBC: HGB 11.7 (L) o/w WNL. (WBC 6.5, )   BMP: Glucose 109 (H), Calcium 8.1 (L) o/w WNL (Creatinine 0.85)    UA: Red, cloudy urine. Blood urine large, Protein Albumin Urine 100, RBC Urine >182 o/w WNL   Urine Culture Aerobic Bacterial: Pending    Emergency Department Course:  Nursing notes and vitals reviewed.  IV was inserted and blood was drawn for laboratory testing, results above.  The patient provided a urine sample here in the emergency department. This was sent for laboratory testing, findings above.    0627: I performed an exam of the patient as documented above.   0805: Patient rechecked and updated.   0849: Patient rechecked and updated.   0926: I spoke with Dr. Torres of the Urology service from [facility] regarding patient's presentation, findings, and plan of " care.  0928: Patient rechecked and updated.   0933: I spoke with Dr. Gould of the Hospitalist service regarding patient's presentation, findings, and plan of care.  0935: I spoke with Dr. Torres of the Urology service from [facility] regarding patient's presentation, findings, and plan of care.  0936: I confirmed with the patient that he has not eaten or drank anything today.     Findings and plan explained to the Patient and spouse who consents to admission. Discussed the patient with Dr. Gould, who will admit the patient to a OBS bed for further monitoring, evaluation, and treatment.  I personally reviewed the laboratory results with the Patient and spouse and answered all related questions prior to admission.    Impression & Plan      Medical Decision Making:  Lobo Isaacs is a 67 year old male who presents to the emergency department today with urinary retention and gross hematuria; history of recent bladder procedure; not anticoagulated. Patient's medical history and records were reviewed. On intal evaluation, three way Blood was placed and demonstrated gross hematuria with small blood clots and resolution of patient's lower abdominal pain. He was initiated on CBI and continued to have red urine despite two bags of CBI. Case was discussed with  Dr. Torres, the patient's Urologist. Will admit to observation with the hospitalist service for continued CBI and Urology consultation. Patient is to be NPO per Dr. Torres for possible procedure later today. Presentation consistent with urinary retention secondary to gross hematuria. Labs were obtained in the emergency department and demonstrated anemia (hemoglobin 11.7, which is significantly changed from most recent in December). labs otherwise unremarkable. UA without evidence of infection. Urine sent for culture. No indication at this time for empiric antibiotics.     Diagnosis:  Gross hematuria  Urinary  retention    Disposition:  Admitted to OBS    Scribe Disclosure:  I, Myriam Jackson, am serving as a scribe at 6:27 AM on 3/15/2018 to document services personally performed by Victorino Daily DO based on my observations and the provider's statements to me.   3/15/2018    EMERGENCY DEPARTMENT       Victorino Daily DO  03/15/18 1423

## 2018-03-15 NOTE — PROGRESS NOTES
RECEIVING UNIT ED HANDOFF REVIEW    ED Nurse Handoff Report was reviewed by: Caitlin Toscano on March 15, 2018 at 10:26 AM

## 2018-03-15 NOTE — H&P
Admitted:     03/15/2018      PRIMARY CARE PHYSICIAN:  Efra Crockett MD      CHIEF COMPLAINT:  Urinary retention, blood in urine.      History is obtained primarily from patient and chart review.  Wife accompanies patient at bedside and contributes some to history.      Lobo Isaacs is a 67-year-old gentleman with past medical history of bladder cancer, hypertension and hyperlipidemia who presented to the Emergency Department on 03/15/2018 for further evaluation of urinary retention and gross hematuria.  The patient recently had a cystoscopy with bilateral retrograde pyelograms, bladder biopsies and transurethral resection of a medium-sized bladder tumor with fulguration and Blood catheter insertion on 03/08/2018.  The surgery was performed by Dr. Torres of Urology.  The patient tolerated the surgery well.  She was discharged to home.  On 03/13/2018, he developed urinary retention and was thus seen in Urology Clinic where UA was obtained which showed a large amount of blood, a large leukocyte esterase.  Urine culture showed no growth.  The patient was bladder scanned and noted to have a PVR of 223 mL.  A catheter was placed at that time and irrigation was performed.  The patient's urine cleared up and catheter was thus removed and the patient was discharged home.  The patient was prescribed tamsulosin at that time.  He did well for about a day and a half, but now presents again with urinary retention and gross hematuria.      In the ED, the patient was seen and assessed by Dr. Daily who obtained basic labs and imaging which revealed an unremarkable BMP, CBC with hemoglobin of 11.7, which is down from prior hemoglobin of 14.2 in 12/2017.  UA showed large amount of blood, urine culture pending.  A 3-way catheter was placed in the ED and continuous bladder irrigation was initiated.  Dr. Torres of Urology was made aware of the patient with plans to register the patient under observation status for further  evaluation and treatment.      On my interview, the patient confirms the above history.  The catheter bag contains hematuria.  He denies any abdominal pain, dizziness, lightheadedness, palpitations, nausea, vomiting, fevers or chills.  No recent illness.  He has been otherwise well without any recent medication changes.      REVIEW OF SYSTEMS:  A 10-point review of systems was performed and is otherwise negative unless specified in the HPI.      PAST MEDICAL HISTORY:   1.  Bladder cancer.   2.  Hypertension.   3.  Hyperlipidemia, not treated.      MEDICATIONS:    Prior to Admission Medications   Prescriptions Last Dose Informant Patient Reported? Taking?   Acetaminophen (TYLENOL PO) 3/15/2018 at Unknown time Self Yes Yes   Sig: Take 500 mg by mouth every 4 hours as needed for mild pain or fever   metoprolol succinate (TOPROL-XL) 50 MG 24 hr tablet 3/14/2018 at Unknown time Self No Yes   Sig: Take 1 tablet (50 mg) by mouth daily   tamsulosin (FLOMAX) 0.4 MG capsule 3/14/2018 at Unknown time Self No Yes   Sig: Take 1 capsule (0.4 mg) by mouth daily      Facility-Administered Medications: None      ALLERGIES:  NO KNOWN DRUG ALLERGIES.       PAST SURGICAL HISTORY:    Past Surgical History:   Procedure Laterality Date     COLONOSCOPY       CYSTOSCOPY       CYSTOSCOPY, BIOPSY BLADDER, COMBINED N/A 3/8/2018    Procedure: COMBINED CYSTOSCOPY, BIOPSY BLADDER;;  Surgeon: Cody Torres MD;  Location:  OR     CYSTOSCOPY, CYSTOGRAM, COMBINED N/A 3/2/2015    Procedure: COMBINED CYSTOSCOPY, CYSTOGRAM;  Surgeon: Cody Torres MD;  Location:  OR     CYSTOSCOPY, FULGURATE BLEEDERS, EVACUATE CLOT(S), COMBINED N/A 3/2/2015    Procedure: COMBINED CYSTOSCOPY, FULGURATE BLEEDERS, EVACUATE CLOT(S);  Surgeon: Cody Torres MD;  Location:  OR     CYSTOSCOPY, RETROGRADES, COMBINED  3/2/2015    Procedure: COMBINED CYSTOSCOPY, RETROGRADES;  Surgeon: Cody Torres MD;  Location:  OR     CYSTOSCOPY,  RETROGRADES, COMBINED  2015    Procedure: COMBINED CYSTOSCOPY, RETROGRADES;  Surgeon: Cody Torres MD;  Location:  OR     CYSTOSCOPY, RETROGRADES, COMBINED Bilateral 3/8/2018    Procedure: COMBINED CYSTOSCOPY, RETROGRADES;  CYSTOSCOPY, BILATERAL RETROGRADES, TRANSURETHRAL RESECTION OF BLADDER TUMOR, BLADDER BIOPSY;  Surgeon: Cody Torres MD;  Location:  OR     CYSTOSCOPY, TRANSURETHRAL RESECTION (TUR) TUMOR BLADDER, COMBINED N/A 3/2/2015    Procedure: COMBINED CYSTOSCOPY, TRANSURETHRAL RESECTION (TUR) TUMOR BLADDER;  Surgeon: Cody Torres MD;  Location: [facility]     CYSTOSCOPY, TRANSURETHRAL RESECTION (TUR) TUMOR BLADDER, COMBINED N/A 2015    Procedure: COMBINED CYSTOSCOPY, TRANSURETHRAL RESECTION (TUR) TUMOR BLADDER;  Surgeon: Cody Torres MD;  Location:  OR     CYSTOSCOPY, TRANSURETHRAL RESECTION (TUR) TUMOR BLADDER, COMBINED N/A 3/8/2018    Procedure: COMBINED CYSTOSCOPY, TRANSURETHRAL RESECTION (TUR) TUMOR BLADDER;;  Surgeon: Cody Torres MD;  Location:  OR     HERNIORRHAPHY INGUINAL Right 2015    Procedure: HERNIORRHAPHY INGUINAL;  Surgeon: Levi Warren MD;  Location: [facility]     LAPAROSCOPIC HERNIORRHAPHY INGUINAL Right 2015    Procedure: LAPAROSCOPIC HERNIORRHAPHY INGUINAL;  Surgeon: Levi Warren MD;  Location: [facility]     SOCIAL HISTORY:  The patient lives in [place] with his wife.  He denies current tobacco use, prior use.  Occasional alcohol use, about 1 time per year.  No illicit drug use.      FAMILY HISTORY:  Mother with heart disease, father with lung cancer,  at 88.  Sister with colorectal cancer diagnosed at 30 years old.      LABORATORY DATA:  Reviewed per Epic and noted in the HPI.  Of note, hemoglobin 11.7, down from 14.2 in 2017.  Urine culture pending.      IMAGING:  None at this time.        VITAL SIGNS:  T98, P 81, /71, R 16, SpO2 98% on room air:     CONSTITUTIONAL: Pt laying in bed,  dressed in hospital garb. Appears comfortable. Cooperative with interview. Accompanied by wife at bedside.   HEENT: Normocephalic, atraumatic. Negative for conjunctival redness or scleral icterus.    CARDIOVASCULAR: RRR, no murmurs, rubs, or extra heart sounds appreciated. Pulses +2/4 and regular in upper and lower extremities, bilaterally.   RESPIRATORY: No increased work of breathing.  CTA, bilat; no wheezes, rales, or rhonchi appreciated.  GASTROINTESTINAL:  Abdomen soft, non-distended. BS auscultated in all four quadrants. Negative for tenderness to palpation.  No masses or organomegaly noted.  MUSCULOSKELETAL: No gross deformities noted. Normal muscle tone.   HEMATOLOGIC/LYMPHATIC/IMMUNOLOGIC: Negative for lower extremity edema, bilaterally.  NEUROLOGIC: Alert and oriented to person, place, and time.  strength intact.   SKIN: Warm, dry, intact. No jaundice noted. Negative for suspicious lesions, rashes, bruising, open sores or abrasions.   : Catheter in place with hematuria (bright red) noted in the catheter bag.      ASSESSMENT AND PLAN:  Lobo Isaacs is a 67-year-old gentleman with past medical history of bladder cancer, hypertension and hyperlipidemia who presented to the Emergency Department on 03/15/2018 with complaints of urinary retention and hematuria.  Plan for observation admission for further evaluation and treatment.  Vitals currently within normal limits.  The patient is currently stable.     Urinary retention  Gross hematuria  History of bladder cancer:  The patient followed by Dr. [facility].  He was initially seen in 2015 with gross hematuria.  At that time, found to have very extensive involvement of the bladder with tumors, which fortunately after careful and extensive resection were all low-grade.  He did have an extraperitoneal leak into the bladder initially so he was catheterized for prolonged periods until cystogram had shown the bladder had healed.  As a result,  intravesical instillation of any agents was not considered at that time.  Since then he had done well, but recently on check cystoscopy was found to have recurrence immediately lateral to the right ureteric orifice.  He is status post cystoscopy, bilateral retrograde pyelograms, bladder biopsies and transurethral resection of a medium-sized bladder tumor with fulguration on 2018.  He was seen in Urology Clinic on 2018 for urinary retention with a PVR of 223 mL.  At that time, a catheter was placed, noted gross hematuria and he was irrigated.  Ultimately discharged home without catheter.  Now presenting again with retention and hematuria.  Hemoglobin 11.7, down from 14.2 in 2017.   -- [place] under observation status.   -- Urology consulted; Dr. Torres aware of patient.   -- Blood catheter placed in the Emergency Department, continuous bladder irrigation ordered.   -- NPO until Urology sees.   -- Recheck hemoglobin at noon.   -- CBC and BMP in a.m.   -- Continue PTA Flomax.     Acute blood loss anemia:  Secondary to above.  Note plan as above with repeat hemoglobin at noon.     Recent Labs  Lab 03/15/18  0645   HGB 11.7*     Hypertension:  Continue PTA metoprolol.     Deep venous thrombosis prophylaxis:  Encouraged ambulation.      CODE STATUS:  Full code.      The patient was seen and assessed with Dr. Gould, who agrees with the plan as outlined above.       CARIE GOULD MD       As dictated by KORIN PIPER PA-C            D: 03/15/2018   T: 03/15/2018   MT: ISAI      Name:     NII MARQUIS   MRN:      [id number]        Account:      [id number]   :      1950        Admitted:     03/15/2018                   Document: [id number]

## 2018-03-15 NOTE — PROGRESS NOTES
Pt settled in room. A&O x4. VSS on RA. Capno on, WDL. Blood in place, draining clear yellow urine. Denies pain. LR infusing at 75 mL/hr.

## 2018-03-15 NOTE — ANESTHESIA CARE TRANSFER NOTE
Patient: Lobo Isaacs    Procedure(s):  CYSTOSCOPY, FULGURATION OF BLEEDERS. - Wound Class: II-Clean Contaminated    Diagnosis: GROSS HEMATURIA.  Diagnosis Additional Information: No value filed.    Anesthesia Type:   General, LMA     Note:  Airway :Face Mask  Patient transferred to:PACU  Handoff Report: Identifed the Patient, Identified the Reponsible Provider, Reviewed the pertinent medical history, Discussed the surgical course, Reviewed Intra-OP anesthesia mangement and issues during anesthesia, Set expectations for post-procedure period and Allowed opportunity for questions and acknowledgement of understanding    At end of procedure, spontaneous respirations, adequate tidal volumes, LMA removed atraumatically, airway patent after LMA removal. Oxygen via facemask at 10 liters per minute to PACU. Oxygen tubing connected to wall O2 in PACU, SpO2, NiBP, and EKG monitors and alarms on and functioning, report on patient's clinical status given to PACU RN, RN questions answered.    Electronically Signed By: DIANE Nicole CRNA  March 15, 2018  1:22 PM

## 2018-03-15 NOTE — PHARMACY-ADMISSION MEDICATION HISTORY
Admission medication history interview status for the 3/15/2018  admission is complete. See EPIC admission navigator for prior to admission medications     Medication history source reliability:Good    Actions taken by pharmacist (provider contacted, etc):None     Additional medication history information not noted on PTA med list :None    Medication reconciliation/reorder completed by provider prior to medication history? No    Time spent in this activity: 10    Prior to Admission medications    Medication Sig Last Dose Taking? Auth Provider   Acetaminophen (TYLENOL PO) Take 500 mg by mouth every 4 hours as needed for mild pain or fever Past Week at Unknown time Yes Reported, Patient   tamsulosin (FLOMAX) 0.4 MG capsule Take 1 capsule (0.4 mg) by mouth daily 3/14/2018 at Unknown time Yes Cody Torres MD   metoprolol succinate (TOPROL-XL) 50 MG 24 hr tablet Take 1 tablet (50 mg) by mouth daily 3/14/2018 at Unknown time Yes Efra Crockett MD

## 2018-03-15 NOTE — IP AVS SNAPSHOT
[facility]    [address] [place] [id number]    Phone:  [phone]                                       After Visit Summary   3/15/2018    Lobo Isaacs    MRN: [id number]           After Visit Summary Signature Page     I have received my discharge instructions, and my questions have been answered. I have discussed any challenges I see with this plan with the nurse or doctor.    ..........................................................................................................................................  Patient/Patient Representative Signature      ..........................................................................................................................................  Patient Representative Print Name and Relationship to Patient    ..................................................               ................................................  Date                                            Time    ..........................................................................................................................................  Reviewed by Signature/Title    ...................................................              ..............................................  Date                                                            Time

## 2018-03-15 NOTE — ANESTHESIA POSTPROCEDURE EVALUATION
Patient: Lobo Isaacs    Procedure(s):  CYSTOSCOPY, FULGURATION OF BLEEDERS. - Wound Class: II-Clean Contaminated    Diagnosis:GROSS HEMATURIA.  Diagnosis Additional Information: No value filed.    Anesthesia Type:  General, LMA    Note:  Anesthesia Post Evaluation    Patient location during evaluation: PACU  Patient participation: Able to fully participate in evaluation  Level of consciousness: sleepy but conscious and responsive to verbal stimuli  Pain management: adequate  Airway patency: patent  Cardiovascular status: acceptable and hemodynamically stable  Respiratory status: acceptable and unassisted  Hydration status: acceptable  PONV: none     Anesthetic complications: None          Last vitals:  Vitals:    03/15/18 1410 03/15/18 1420 03/15/18 1431   BP: 141/77 138/73    Pulse:      Resp: 12 9    Temp:      SpO2: 100% 100% 99%         Electronically Signed By: See Walsh MD  March 15, 2018  2:33 PM

## 2018-03-15 NOTE — ED NOTES
"[facility]  ED Nurse Handoff Report    ED Chief complaint: Urinary Retention (pt recently had surgery to remove cancer from bladder and has been having issues voiding since having bladder irrigated on Tuesday)      ED Diagnosis:   Final diagnoses:   None       Code Status: Full Code    Allergies: No Known Allergies    Activity level - Baseline/Home:  Independent    Activity Level - Current:   Independent     Needed?: No    Isolation: No  Infection: Not Applicable    Bariatric?: No    Vital Signs:   Vitals:    03/15/18 0608   BP: 154/75   Pulse: 81   Resp: 16   Temp: 98  F (36.7  C)   TempSrc: Oral   SpO2: 99%   Weight: 63 kg (139 lb)   Height: 1.727 m (5' 8\")       Cardiac Rhythm: ,        Pain level: 0-10 Pain Scale: 0    Is this patient confused?: No     Patient Report: Initial Complaint: urinary retention  Focused Assessment: Pt presents to ED with hx of bladder cancer s/p recent cystoscopy and tumor removal of bladder who presents with urinary retention.  Pt was seen in clinic a couple days ago, irrigated bladder and sent home.  Placed 3 way with continuous bladder irrigation in ED.  Patient on 2nd bag of irrigation fluid.  Tolerating well.  Clear urine output after 1st bag.  Small clots found initially.  Urology consulted. Patient a/o x4.  Spouse @ bedside.  Tests Performed: blood work/UA  Abnormal Results: hmg 11.7  Treatments provided: bladder irrigation    Family Comments: spouse Gema @ bedside    OBS brochure/video discussed/provided to patient: Yes    ED Medications: Medications - No data to display    Drips infusing?:  No    For the majority of the shift this patient was Green.   Interventions performed were .    Severe Sepsis OR Septic Shock Diagnosis Present: No      ED NURSE PHONE NUMBER: *[phone]         "

## 2018-03-15 NOTE — CONSULTS
Consult Date:  03/15/2018      HISTORY OF PRESENT ILLNESS:  Lobo Isaacs is a very pleasant 67-year-old gentleman came to the emergency room today with gross hematuria and retention of urine.  He had transurethral resection of a fairly large bladder tumor on 02/08/2018.  He also had some bladder biopsies taken and this tumor was found to be a low-grade noninvasive tumor.  However, he was told not to lift more than 10 pounds for a month, but yesterday was sweeping out his garage and during the early hours this morning developed gross hematuria with retention of urine.  He came to the emergency room this morning, was catheterized and apparently the urine was quite bloody and continued to remain bloody even with irrigation.  Therefore, we admitted him to the observation unit.        Previous history indicated that he had presented with gross hematuria in 2015, at which time he was found to have very extensive involvement of the bladder by papillary tumors which were extensively resected and found to be a low-grade noninvasive tumors.  At that time, because of the extent of the resection, he actually even had an extraperitoneal leak and we had to drain the bladder for a month with a catheter until the cystogram showed no evidence of residual leak of urine from the bladder.  Since that time, although until recently when we noticed a small recurrence in a flat area of tumor approximately just over 2 cm in diameter just lateral to the right ureteric orifice, he had had very stable progress.        PAST MEDICAL HISTORY:  Other past medical history includes a history of hypertension and hyperlipidemia.      ALLERGIES:  NONE.      PAST SURGICAL HISTORY:  Includes, in addition to the cystoscopy for bladder tumors, laparoscopic repair of an inguinal hernia.      SOCIAL HISTORY:  The patient lives in this city with his wife.  He is a nonsmoker.      FAMILY HISTORY:  There is no immediately related family history, although his  father did have lung cancer.      REVIEW OF SYSTEMS:  When I saw him, no other specific complaints at this time in 10-system review.        PHYSICAL EXAMINATION:   VITAL SIGNS:  At the time I saw him, he was afebrile with a blood pressure of 154/80 and a pulse rate of 82.   GENERAL:  He is a pleasant gentleman who is in no acute distress and was conversant and well oriented in time, place and person.   MENTAL STATUS:  Slightly anxious.   HEAD, EYES, EARS, NOSE AND THROAT:  There was no clinical evidence of jaundice and the mucous membranes are normal.  There were no other remarkable features.   SKIN:  Otherwise normal to examination.   LUNGS:  Respiratory cycle was normal and clear to both auscultation and percussion.     CARDIOVASCULAR:  Heart sounds were normal and no murmurs were heard.  Peripheral pulses were palpable and regular.     ABDOMEN:  Soft.  There were no palpable masses.  The bladder was not palpable.  There was no evidence of tenderness or any other significant features.  There was no tenderness in either renal angle.   EXTREMITIES:  Showed no evidence of peripheral edema.  No other remarkable features.   NEUROLOGIC:  There were no focal abnormal clinical neurologic signs in the central or peripheral nervous systems.   GENITOURINARY:  Catheter is in place in the bladder.        After I reviewed the situation and discussed the situation with the patient, we decided that we should proceed with cystoscopy under anesthesia to evacuating any clots and thoroughly fulgurate anywhere that may have started bleeding.  I did explain this very carefully to the patient today.  He understood this and is very much in agreement with pursuing this line of action.      This included not only evaluation of the patient and evaluating his history, but also review of records, discussion with colleagues, review of pertinent lab and radiologic studies, and decision making.         JAGJIT CONTRERAS MD             D:  03/15/2018   T: 03/15/2018   MT: SHERRY      Name:     NII MARQUIS   MRN:      [id number]        Account:       [id number]   :      1950           Consult Date:  03/15/2018      Document: [id number]

## 2018-03-16 VITALS
SYSTOLIC BLOOD PRESSURE: 152 MMHG | HEIGHT: 68 IN | HEART RATE: 70 BPM | TEMPERATURE: 98.8 F | OXYGEN SATURATION: 99 % | BODY MASS INDEX: 21.07 KG/M2 | WEIGHT: 139 LBS | RESPIRATION RATE: 16 BRPM | DIASTOLIC BLOOD PRESSURE: 55 MMHG

## 2018-03-16 LAB
ANION GAP SERPL CALCULATED.3IONS-SCNC: 8 MMOL/L (ref 3–14)
BACTERIA SPEC CULT: NO GROWTH
BASOPHILS # BLD AUTO: 0 10E9/L (ref 0–0.2)
BASOPHILS NFR BLD AUTO: 0.1 %
BUN SERPL-MCNC: 13 MG/DL (ref 7–30)
CALCIUM SERPL-MCNC: 8.4 MG/DL (ref 8.5–10.1)
CHLORIDE SERPL-SCNC: 101 MMOL/L (ref 94–109)
CO2 SERPL-SCNC: 26 MMOL/L (ref 20–32)
CREAT SERPL-MCNC: 0.8 MG/DL (ref 0.66–1.25)
DIFFERENTIAL METHOD BLD: ABNORMAL
EOSINOPHIL # BLD AUTO: 0 10E9/L (ref 0–0.7)
EOSINOPHIL NFR BLD AUTO: 0.3 %
ERYTHROCYTE [DISTWIDTH] IN BLOOD BY AUTOMATED COUNT: 13.1 % (ref 10–15)
GFR SERPL CREATININE-BSD FRML MDRD: >90 ML/MIN/1.7M2
GLUCOSE BLDC GLUCOMTR-MCNC: 106 MG/DL (ref 70–99)
GLUCOSE SERPL-MCNC: 106 MG/DL (ref 70–99)
HCT VFR BLD AUTO: 34.7 % (ref 40–53)
HGB BLD-MCNC: 11.8 G/DL (ref 13.3–17.7)
IMM GRANULOCYTES # BLD: 0 10E9/L (ref 0–0.4)
IMM GRANULOCYTES NFR BLD: 0.3 %
LYMPHOCYTES # BLD AUTO: 1.5 10E9/L (ref 0.8–5.3)
LYMPHOCYTES NFR BLD AUTO: 16.3 %
Lab: NORMAL
MCH RBC QN AUTO: 29.4 PG (ref 26.5–33)
MCHC RBC AUTO-ENTMCNC: 34 G/DL (ref 31.5–36.5)
MCV RBC AUTO: 87 FL (ref 78–100)
MONOCYTES # BLD AUTO: 0.8 10E9/L (ref 0–1.3)
MONOCYTES NFR BLD AUTO: 8.3 %
NEUTROPHILS # BLD AUTO: 6.9 10E9/L (ref 1.6–8.3)
NEUTROPHILS NFR BLD AUTO: 74.7 %
NRBC # BLD AUTO: 0 10*3/UL
NRBC BLD AUTO-RTO: 0 /100
PLATELET # BLD AUTO: 200 10E9/L (ref 150–450)
POTASSIUM SERPL-SCNC: 4.3 MMOL/L (ref 3.4–5.3)
RBC # BLD AUTO: 4.01 10E12/L (ref 4.4–5.9)
SODIUM SERPL-SCNC: 135 MMOL/L (ref 133–144)
SPECIMEN SOURCE: NORMAL
WBC # BLD AUTO: 9.3 10E9/L (ref 4–11)

## 2018-03-16 PROCEDURE — 36415 COLL VENOUS BLD VENIPUNCTURE: CPT | Performed by: PHYSICIAN ASSISTANT

## 2018-03-16 PROCEDURE — 00000146 ZZHCL STATISTIC GLUCOSE BY METER IP

## 2018-03-16 PROCEDURE — 85025 COMPLETE CBC W/AUTO DIFF WBC: CPT | Performed by: PHYSICIAN ASSISTANT

## 2018-03-16 PROCEDURE — 80048 BASIC METABOLIC PNL TOTAL CA: CPT | Performed by: PHYSICIAN ASSISTANT

## 2018-03-16 PROCEDURE — G0378 HOSPITAL OBSERVATION PER HR: HCPCS

## 2018-03-16 PROCEDURE — 99217 ZZC OBSERVATION CARE DISCHARGE: CPT | Performed by: PHYSICIAN ASSISTANT

## 2018-03-16 PROCEDURE — A9270 NON-COVERED ITEM OR SERVICE: HCPCS | Mod: GY | Performed by: PHYSICIAN ASSISTANT

## 2018-03-16 PROCEDURE — 25000132 ZZH RX MED GY IP 250 OP 250 PS 637: Mod: GY | Performed by: PHYSICIAN ASSISTANT

## 2018-03-16 RX ADMIN — METOPROLOL SUCCINATE 50 MG: 50 TABLET, EXTENDED RELEASE ORAL at 09:00

## 2018-03-16 RX ADMIN — TAMSULOSIN HYDROCHLORIDE 0.4 MG: 0.4 CAPSULE ORAL at 09:00

## 2018-03-16 NOTE — DISCHARGE SUMMARY
[facility]    Discharge Summary  Hospitalist    Date of Admission:  3/15/2018  Date of Discharge:  3/17/2018  Discharging Provider: Laura Boone PA-C  Date of Service (when I saw the patient): 03/17/18    Discharge Diagnoses   S/P cystoscopy with clot evacuation and fulguration on 3/15/17  Urinary retention  Gross hematuria    History of Present Illness   Lobo Isaacs is a 67-year-old gentleman with past medical history of bladder cancer, hypertension and hyperlipidemia who presented to the Emergency Department on 03/15/2018 with complaints of urinary retention and hematuria. Registered under observation for further evaluation and treatment.  Vitals currently within normal limits.  The patient is currently stable. See H&P by me from 3/15/18.     Hospital Course   Lobo Isaacs was admitted on 3/15/2018.  The following problems were addressed during his hospitalization:    S/P cystoscopy with clot evacuation and fulguration on 3/15/17  Urinary retention  Gross hematuria  History of bladder cancer:  The patient followed by Dr. Torres [facility].  He was initially seen in 2015 with gross hematuria.  At that time, found to have very extensive involvement of the bladder with tumors, which fortunately after careful and extensive resection were all low-grade.  He did have an extraperitoneal leak into the bladder initially so he was catheterized for prolonged periods until cystogram had shown the bladder had healed.  As a result, intravesical instillation of any agents was not considered at that time.  Since then he had done well, but recently on check cystoscopy was found to have recurrence immediately lateral to the right ureteric orifice.  He is status post cystoscopy, bilateral retrograde pyelograms, bladder biopsies and transurethral resection of a medium-sized bladder tumor with fulguration on 03/08/2018.  He was seen in Urology Clinic on 03/13/2018 for urinary retention with  a PVR of 223 mL.  At that time, a catheter was placed, noted gross hematuria and he was irrigated.  Ultimately discharged home without catheter.  Now presenting again with retention and hematuria.  Hemoglobin 11.7, down from 14.2 in 2017. Blood catheter placed in the Emergency Department, continuous bladder irrigation ordered.   -- Urology followed patient throughout stay; pt underwent cystoscopy with clot evacuation and fulguration of bleeding areas in the bladder on 3/15. Procedure by Dr. Torres. Pt tolerated well. Plan to continue with Blood catheter, wife (who is an RN) will remove Blood on 3/19, continue Flomax at discharge, f/u with Urology in place     Acute blood loss anemia:  Secondary to above. Stable.     Recent Labs  Lab 03/16/18  0620 03/15/18  1540 03/15/18  0645   HGB 11.8* 11.9* 11.7*      Hypertension:  Continue PTA metoprolol.     # Discharge Pain Plan:   - Patient currently has NO PAIN and is not being prescribed pain medications on discharge.    Laura Boone PA-C    This patient was discussed with Dr. Arguello of the Hospitalist Service who agrees with current plans as outlined above.     Significant Results and Procedures   See below     Pending Results   These results will be followed up by PCP   Unresulted Labs Ordered in the Past 30 Days of this Admission     Date and Time Order Name Status Description    3/15/2018 0657 Urine Culture Preliminary           Code Status   Full Code       Primary Care Physician   EVERTON TRIPLETT    Physical Exam                      Vitals:    03/15/18 0608   Weight: 63 kg (139 lb)     Vital Signs with Ranges     I/O last 3 completed shifts:  In: 1100 [P.O.:200; I.V.:900]  Out: 4240 [Urine:4225; Blood:15]    CONSTITUTIONAL: Pt laying in bed, dressed in hospital garb. Appears comfortable. Cooperative with interview. Accompanied by wife at bedside.   HEENT: Normocephalic, atraumatic. Oral mucosa pink and moist; negative for ulcerations, erythema, or  exudates.  Dentition in good repair.   CARDIOVASCULAR: RRR, no murmurs, rubs, or extra heart sounds appreciated. Pulses +2/4 and regular in upper and lower extremities, bilaterally.   RESPIRATORY: No increased work of breathing.  CTA, bilat; no wheezes, rales, or rhonchi appreciated.  GASTROINTESTINAL:  Abdomen soft, non-distended. BS auscultated in all four quadrants. Negative for tenderness to palpation.  No masses or organomegaly noted.  MUSCULOSKELETAL: No gross deformities noted. Normal muscle tone.   HEMATOLOGIC/LYMPHATIC/IMMUNOLOGIC: No anterior or posterior cervical LAD, bilaterally. Negative for lower extremity edema, bilaterally.  NEUROLOGIC: Alert and oriented to person, place, and time.  strength intact.   SKIN: Warm, dry, intact. No jaundice noted. Negative for suspicious lesions, rashes, bruising, open sores or abrasions.   : Blood catheter in place with straw colored urine draining    Discharge Disposition   Discharged to home  Condition at discharge: Stable    Consultations This Hospital Stay   UROLOGY IP CONSULT    Time Spent on this Encounter   I, Laura Boone, personally saw the patient today and spent greater than 30 minutes discharging this patient.    Discharge Orders     No lifting    No lifting over 10  lbs and no strenuous physical activity for 3 weeks     Reason for your hospital stay   You were hospitalized for further evaluation and treatment of urinary retention and gross hematuria.     Follow-up and recommended labs and tests    Follow up with primary care provider, EVERTON TRIPLETT, within 7 days for hospital follow- up.  No follow up labs or test are needed.    Follow up with Urology as scheduled, they will contact you with appointment time. Please call clinic if you don't hear from them in the next 48 hours.     Activity   Your activity upon discharge: activity as tolerated     Discharge Instructions   1) Follow-up with your primary care provider in the next week to  discuss hospitalization   2) Follow-up with Urology as scheduled to discuss hospitalization and follow-up on biopsy results   3) Wife to remove Blood catheter on 3/19, syringe provided at discharge   4) Continue with Flomax until Urology follow-up     Full Code     Diet   Follow this diet upon discharge: Regular diet       Discharge Medications   Current Discharge Medication List      CONTINUE these medications which have NOT CHANGED    Details   Acetaminophen (TYLENOL PO) Take 500 mg by mouth every 4 hours as needed for mild pain or fever      tamsulosin (FLOMAX) 0.4 MG capsule Take 1 capsule (0.4 mg) by mouth daily  Qty: 30 capsule, Refills: 0    Associated Diagnoses: Urinary retention with incomplete bladder emptying      metoprolol succinate (TOPROL-XL) 50 MG 24 hr tablet Take 1 tablet (50 mg) by mouth daily  Qty: 90 tablet, Refills: 3    Associated Diagnoses: Hypertension goal BP (blood pressure) < 140/80           Allergies   No Known Allergies  Data   Most Recent 3 CBC's:  Recent Labs   Lab Test  03/16/18   0620  03/15/18   1540  03/15/18   0645  12/07/17   0935   WBC  9.3   --   6.5  6.5   HGB  11.8*  11.9*  11.7*  14.2   MCV  87   --   87  91   PLT  200   --   198  224      Most Recent 3 BMP's:  Recent Labs   Lab Test  03/16/18   0620  03/15/18   0645  03/06/18   0923   NA  135  133  137   POTASSIUM  4.3  4.0  4.6   CHLORIDE  101  99  103   CO2  26  28  30   BUN  13  13  15   CR  0.80  0.85  0.92   ANIONGAP  8  6  4   KARLA  8.4*  8.1*  8.8   GLC  106*  109*  99     Most Recent 2 LFT's:  Recent Labs   Lab Test  10/06/16   0916  05/28/15   0946  03/12/15   0606   AST  20  21  13   ALT  21  19  18   ALKPHOS  67   --   49   BILITOTAL  1.1   --   0.4     Most Recent INR's and Anticoagulation Dosing History:  Anticoagulation Dose History     Recent Dosing and Labs Latest Ref Rng & Units 3/2/2015    INR 0.86 - 1.14 1.09        Most Recent 3 Troponin's:No lab results found.  Most Recent Cholesterol Panel:  Recent  Labs   Lab Test  12/07/17   0935   CHOL  270*   LDL  178*   HDL  78   TRIG  70     Most Recent 6 Bacteria Isolates From Any Culture (See EPIC Reports for Culture Details):  Recent Labs   Lab Test  03/15/18   0645  03/13/18   1117  09/02/15   1520  01/28/15   1722   CULT  No growth  No growth  No growth  No growth     Most Recent TSH, T4 and A1c Labs:No lab results found.  Results for orders placed or performed during the hospital encounter of 03/08/18   XR Surgery MURPHY L/T 5 Min Fluoro w Stills    Narrative    SURGERY C-ARM FLUOROSCOPY LESS THAN FIVE MINUTES WITH STILLS  3/8/2018  7:45 AM     COMPARISON: 7/7/2015    HISTORY: Bilateral retrogrades.    NUMBER OF IMAGES ACQUIRED: 2    VIEWS: 2    FLUOROSCOPY TIME: .2 minutes.      Impression    IMPRESSION: Normal bilateral retrograde pyelograms. Bifid left renal  collecting system. No change.    ARIAN MANE MD

## 2018-03-16 NOTE — PROGRESS NOTES
[facility] Urology Progress Note          Assessment and Plan:   Active Problems:    Hematuria, AUR, bladder cancer  A/P: POD 1 clot evac and fulguration  Home today with Blood. Wife is a nurse and will remove at home on Monday.   Continue with Flomax  Will coordinate appt with Dr. Torres to review path.     Discussed with Hospitalist MICHAEL Barnard PA-C  [facility]  Cell: [phone] (text or call until 5pm, Mon-Wed & Fri)               Interval History:   doing well; no cp, sob, n/v/d, or abd pain. Blood clear              Review of Systems:   The 5 point Review of Systems is negative other than noted in the HPI             Medications:     Current Facility-Administered Medications Ordered in Epic   Medication Dose Route Frequency Last Rate Last Dose     acetaminophen (TYLENOL) tablet 500 mg  500 mg Oral Q4H PRN         metoprolol succinate (TOPROL-XL) 24 hr tablet 50 mg  50 mg Oral Daily   50 mg at 03/16/18 0900     tamsulosin (FLOMAX) capsule 0.4 mg  0.4 mg Oral Daily   0.4 mg at 03/16/18 0900     acetaminophen (TYLENOL) tablet 650 mg  650 mg Oral Q4H PRN         acetaminophen (TYLENOL) Suppository 650 mg  650 mg Rectal Q4H PRN         naloxone (NARCAN) injection 0.1-0.4 mg  0.1-0.4 mg Intravenous Q2 Min PRN         melatonin tablet 1 mg  1 mg Oral At Bedtime PRN         ondansetron (ZOFRAN-ODT) ODT tab 4 mg  4 mg Oral Q6H PRN        Or     ondansetron (ZOFRAN) injection 4 mg  4 mg Intravenous Q6H PRN         senna-docusate (SENOKOT-S;PERICOLACE) 8.6-50 MG per tablet 1 tablet  1 tablet Oral BID PRN        Or     senna-docusate (SENOKOT-S;PERICOLACE) 8.6-50 MG per tablet 2 tablet  2 tablet Oral BID PRN         polyethylene glycol (MIRALAX/GLYCOLAX) Packet 17 g  17 g Oral Daily PRN         prochlorperazine (COMPAZINE) injection 5 mg  5 mg Intravenous Q6H PRN        Or     prochlorperazine (COMPAZINE) tablet 5 mg  5 mg Oral Q6H PRN        Or     prochlorperazine (COMPAZINE)  Suppository 12.5 mg  12.5 mg Rectal Q12H PRN         sodium chloride 0.9% (bag) irrigation   Irrigation Continuous         lidocaine 1 % 1 mL  1 mL Other Q1H PRN         lidocaine (LMX4) cream   Topical Q1H PRN         sodium chloride (PF) 0.9% PF flush 3 mL  3 mL Intracatheter Q1H PRN         sodium chloride (PF) 0.9% PF flush 3 mL  3 mL Intracatheter Q8H         lactated ringers infusion   Intravenous Continuous   Stopped at 03/15/18 1800     traMADol (ULTRAM) tablet 50 mg  50 mg Oral Q6H PRN         Current Outpatient Prescriptions Ordered in Epic   Medication     acetaminophen (TYLENOL) 325 MG tablet                  Physical Exam:   Vitals were reviewed  Patient Vitals for the past 8 hrs:   BP Temp Temp src Pulse Resp SpO2   03/16/18 0737 152/55 98.8  F (37.1  C) Oral 70 16 99 %   03/16/18 0500 136/73 97.7  F (36.5  C) Axillary 63 14 97 %     GEN: NAD, lying in bed  HEENT: EOMI  NECK: Supple  ABD: Obese, soft  EXT: No LE edema  : Blood clear           Data:   No results found for: NTBNPI, NTBNP  Lab Results   Component Value Date    WBC 9.3 03/16/2018    WBC 6.5 03/15/2018    WBC 6.5 12/07/2017    HGB 11.8 (L) 03/16/2018    HGB 11.9 (L) 03/15/2018    HGB 11.7 (L) 03/15/2018    HCT 34.7 (L) 03/16/2018    HCT 34.8 (L) 03/15/2018    HCT 42.7 12/07/2017    MCV 87 03/16/2018    MCV 87 03/15/2018    MCV 91 12/07/2017     03/16/2018     03/15/2018     12/07/2017     Lab Results   Component Value Date    INR 1.09 03/02/2015

## 2018-03-16 NOTE — PLAN OF CARE
Problem: Patient Care Overview  Goal: Plan of Care/Patient Progress Review  Outcome: Improving  RN: Alert and oriented X 4, up with SBA. Admitted s/p cystoscopy/clot evacuation with Dr. Torres today. Blood catheter in place draining clear yellow urine. Denies pain. Tolerating regular diet. IV SL. Capno on. VSS on RA. Plan to d/c home tomorrow.

## 2018-03-16 NOTE — PLAN OF CARE
Problem: Patient Care Overview  Goal: Plan of Care/Patient Progress Review  Outcome: No Change  PT Alert and oriented X 4, VSS on RA, on capno RA CMS intact neuro's stable. up with SBA. Admitted s/p cystoscopy/clot evacuation . Blood patentin clear yellow urine. No clot observed at this time. Denies pain. Tolerating regular diet. IV SL. Plan to d/c home this morning.

## 2018-03-17 NOTE — DISCHARGE SUMMARY
Patient has been discharged March 16, 2018. Discharge instructions and education reviewed, medication schedule and follow up appts discussed. Pt had no questions. Syringe sent w/pt and wife- will remove monroy on Monday.

## 2018-03-17 NOTE — PLAN OF CARE
A&Ox4. VSS. Denies pain. Blood patent and draining- urine clear, yellow, no clots. Tolerating regular diet. Ambulating ind.

## 2018-03-20 ENCOUNTER — TELEPHONE (OUTPATIENT)
Dept: FAMILY MEDICINE | Facility: CLINIC | Age: 68
End: 2018-03-20

## 2018-03-20 ENCOUNTER — OFFICE VISIT (OUTPATIENT)
Dept: FAMILY MEDICINE | Facility: CLINIC | Age: 68
End: 2018-03-20
Payer: COMMERCIAL

## 2018-03-20 VITALS
WEIGHT: 137 LBS | HEART RATE: 63 BPM | DIASTOLIC BLOOD PRESSURE: 64 MMHG | OXYGEN SATURATION: 100 % | BODY MASS INDEX: 20.83 KG/M2 | RESPIRATION RATE: 16 BRPM | SYSTOLIC BLOOD PRESSURE: 116 MMHG | TEMPERATURE: 96.7 F

## 2018-03-20 DIAGNOSIS — E78.00 PURE HYPERCHOLESTEROLEMIA: Primary | ICD-10-CM

## 2018-03-20 PROCEDURE — 99214 OFFICE O/P EST MOD 30 MIN: CPT | Performed by: FAMILY MEDICINE

## 2018-03-20 RX ORDER — ATORVASTATIN CALCIUM 10 MG/1
10 TABLET, FILM COATED ORAL DAILY
Qty: 30 TABLET | Refills: 11 | Status: SHIPPED | OUTPATIENT
Start: 2018-03-20 | End: 2018-05-18

## 2018-03-20 RX ORDER — ATORVASTATIN CALCIUM 10 MG/1
10 TABLET, FILM COATED ORAL DAILY
Qty: 30 TABLET | Refills: 11 | Status: SHIPPED | OUTPATIENT
Start: 2018-03-20 | End: 2019-02-19

## 2018-03-20 NOTE — PROGRESS NOTES
SUBJECTIVE:   Lobo Isaacs is a 67 year old male who presents to clinic today for the following health issues:      Labs from            BLOOD IN URINE AND HISTORY OF BLADDER TUMOR  OR HISTORY OF BLADDER ABSCESS     HISTORY OF LEFT ROTATOR CUFF SYNDROME    BILATERAL HEARING LOSS     CATARACT LEFT EYE     HISTORY URINE RETENTION    CALCIUM IN AORTA    HYPERLIPIDEMIA               Topic Date Due     COLONOSCOPY Q5 YR  2018               .  Current Outpatient Prescriptions   Medication Sig Dispense Refill     atorvastatin (LIPITOR) 10 MG tablet Take 1 tablet (10 mg) by mouth daily 30 tablet 11     atorvastatin (LIPITOR) 10 MG tablet Take 1 tablet (10 mg) by mouth daily 30 tablet 11     Acetaminophen (TYLENOL PO) Take 500 mg by mouth every 4 hours as needed for mild pain or fever       tamsulosin (FLOMAX) 0.4 MG capsule Take 1 capsule (0.4 mg) by mouth daily 30 capsule 0     metoprolol succinate (TOPROL-XL) 50 MG 24 hr tablet Take 1 tablet (50 mg) by mouth daily 90 tablet 3              No Known Allergies      Immunization History   Administered Date(s) Administered     Influenza (High Dose) 3 valent vaccine 2015, 2016, 2017     Influenza (IIV3) PF 2012, 2013     Pneumo Conj 13-V (2010&after) 2015     Pneumococcal 23 valent 02/10/2014     TDAP Vaccine (Adacel) 2012               reports that he does not drink alcohol.          reports that he does not use illicit drugs.        family history includes CANCER (age of onset: 87) in his father; Cancer - colorectal (age of onset: 30) in his sister; HEART DISEASE in his mother.        indicated that his mother is . He indicated that his father is . He indicated that the status of his sister is unknown. He indicated that his maternal grandmother is . He indicated that his maternal grandfather is . He indicated that his paternal grandmother is . He indicated that his paternal  grandfather is .          has a past surgical history that includes Cystoscopy, fulgurate bleeders, evacuate clot(s), combined (N/A, 3/2/2015); Cystoscopy, cystogram, combined (N/A, 3/2/2015); Cystoscopy, transurethral resection (TUR) tumor bladder, combined (N/A, 3/2/2015); Cystoscopy, retrogrades, combined (3/2/2015); Cystoscopy, transurethral resection (TUR) tumor bladder, combined (N/A, 2015); Cystoscopy, retrogrades, combined (2015); Laparoscopic herniorrhaphy inguinal (Right, 2015); Herniorrhaphy inguinal (Right, 2015); Cystoscopy; colonoscopy; Cystoscopy, retrogrades, combined (Bilateral, 3/8/2018); Cystoscopy, transurethral resection (TUR) tumor bladder, combined (N/A, 3/8/2018); Cystoscopy, biopsy bladder, combined (N/A, 3/8/2018); and Cystoscopy, fulgurate bleeders, evacuate clot(s), combined (N/A, 3/15/2018).         reports that he currently engages in sexual activity and has had female partners.    .  Pediatric History   Patient Guardian Status     Not on file.     Other Topics Concern     Parent/Sibling W/ Cabg, Mi Or Angioplasty Before 65f 55m? No     Social History Narrative               reports that he has quit smoking. He has never used smokeless tobacco.        Medical, social, surgical, and family histories reviewed.        Labs reviewed in EPIC  Patient Active Problem List   Diagnosis     Microscopic hematuria     Hyperlipidemia with target LDL less than 130     Primary bladder papillary carcinoma (H)     Hypertension goal BP (blood pressure) < 140/80     Bladder cancer (H)     Bladder tumor     Abscess, bladder     Health Care Home     ACP (advance care planning)     Rotator cuff syndrome of left shoulder     Right foot pain     Primary osteoarthritis of right knee     Underweight     Bilateral sensorineural hearing loss     Senile cataract of left eye     Hematuria     Urinary retention       Past Surgical History:   Procedure Laterality Date     COLONOSCOPY        CYSTOSCOPY       CYSTOSCOPY, BIOPSY BLADDER, COMBINED N/A 3/8/2018    Procedure: COMBINED CYSTOSCOPY, BIOPSY BLADDER;;  Surgeon: Cody Torres MD;  Location:  OR     CYSTOSCOPY, CYSTOGRAM, COMBINED N/A 3/2/2015    Procedure: COMBINED CYSTOSCOPY, CYSTOGRAM;  Surgeon: Cody Torres MD;  Location:  OR     CYSTOSCOPY, FULGURATE BLEEDERS, EVACUATE CLOT(S), COMBINED N/A 3/2/2015    Procedure: COMBINED CYSTOSCOPY, FULGURATE BLEEDERS, EVACUATE CLOT(S);  Surgeon: Cody Torres MD;  Location:  OR     CYSTOSCOPY, FULGURATE BLEEDERS, EVACUATE CLOT(S), COMBINED N/A 3/15/2018    Procedure: COMBINED CYSTOSCOPY, FULGURATE BLEEDERS, EVACUATE CLOT(S);  CYSTOSCOPY, FULGURATION OF BLEEDERS.;  Surgeon: Cody Torres MD;  Location:  OR     CYSTOSCOPY, RETROGRADES, COMBINED  3/2/2015    Procedure: COMBINED CYSTOSCOPY, RETROGRADES;  Surgeon: Cody Torres MD;  Location:  OR     CYSTOSCOPY, RETROGRADES, COMBINED  7/7/2015    Procedure: COMBINED CYSTOSCOPY, RETROGRADES;  Surgeon: Cody Torres MD;  Location:  OR     CYSTOSCOPY, RETROGRADES, COMBINED Bilateral 3/8/2018    Procedure: COMBINED CYSTOSCOPY, RETROGRADES;  CYSTOSCOPY, BILATERAL RETROGRADES, TRANSURETHRAL RESECTION OF BLADDER TUMOR, BLADDER BIOPSY;  Surgeon: Cody Torres MD;  Location:  OR     CYSTOSCOPY, TRANSURETHRAL RESECTION (TUR) TUMOR BLADDER, COMBINED N/A 3/2/2015    Procedure: COMBINED CYSTOSCOPY, TRANSURETHRAL RESECTION (TUR) TUMOR BLADDER;  Surgeon: Cody Torres MD;  Location:  OR     CYSTOSCOPY, TRANSURETHRAL RESECTION (TUR) TUMOR BLADDER, COMBINED N/A 7/7/2015    Procedure: COMBINED CYSTOSCOPY, TRANSURETHRAL RESECTION (TUR) TUMOR BLADDER;  Surgeon: Cody Torres MD;  Location:  OR     CYSTOSCOPY, TRANSURETHRAL RESECTION (TUR) TUMOR BLADDER, COMBINED N/A 3/8/2018    Procedure: COMBINED CYSTOSCOPY, TRANSURETHRAL RESECTION (TUR) TUMOR BLADDER;;  Surgeon: Cody Torres  MD Álvaro;  Location:  OR     HERNIORRHAPHY INGUINAL Right 2015    Procedure: HERNIORRHAPHY INGUINAL;  Surgeon: Levi Warren MD;  Location:  [facility]     LAPAROSCOPIC HERNIORRHAPHY INGUINAL Right 2015    Procedure: LAPAROSCOPIC HERNIORRHAPHY INGUINAL;  Surgeon: Levi Warren MD;  Location: [facility]         Social History   Substance Use Topics     Smoking status: Former Smoker     Smokeless tobacco: Never Used      Comment: SMOKED BRIEFLY IN HIS 20'S     Alcohol use No       Family History   Problem Relation Age of Onset     HEART DISEASE Mother      CANCER Father 87     Lung cancer,  age 88     Cancer - colorectal Sister 30             Current Outpatient Prescriptions   Medication Sig Dispense Refill     atorvastatin (LIPITOR) 10 MG tablet Take 1 tablet (10 mg) by mouth daily 30 tablet 11     atorvastatin (LIPITOR) 10 MG tablet Take 1 tablet (10 mg) by mouth daily 30 tablet 11     Acetaminophen (TYLENOL PO) Take 500 mg by mouth every 4 hours as needed for mild pain or fever       tamsulosin (FLOMAX) 0.4 MG capsule Take 1 capsule (0.4 mg) by mouth daily 30 capsule 0     metoprolol succinate (TOPROL-XL) 50 MG 24 hr tablet Take 1 tablet (50 mg) by mouth daily 90 tablet 3           Recent Labs   Lab Test  18   0620  03/15/18   0645   17   0935  10/06/16   0916  11/03/15   0930   05/28/15   0946  03/12/15   0606   LDL   --    --    --   178*   --   144*   --   107   --    HDL   --    --    --   78   --   70   --   67   --    TRIG   --    --    --   70   --   52   --   55   --    ALT   --    --    --    --   21   --    --   19  18   CR  0.80  0.85   < >  0.85  0.93   --    < >  0.90  0.73   GFRESTIMATED  >90  90   < >  90  81   --    < >  84  >90  Non  GFR Calc     GFRESTBLACK  >90  >90   < >  >90  >90   --    < >  >90  >90  African American GFR Calc     POTASSIUM  4.3  4.0   < >  4.7  4.7   --    < >  4.6  3.9    < > = values in this interval not displayed.       "      BP Readings from Last 6 Encounters:   03/20/18 116/64   03/16/18 152/55   03/08/18 126/71   03/06/18 140/82   02/22/18 138/80   12/15/17 130/78           Wt Readings from Last 3 Encounters:   03/20/18 137 lb (62.1 kg)   03/15/18 139 lb (63 kg)   03/08/18 133 lb 8 oz (60.6 kg)                 Positive symptoms or findings indicated by bold designation:         ROS: 10 point ROS neg other than the symptoms noted above in the HPI.except  has Microscopic hematuria; Hyperlipidemia with target LDL less than 130; Primary bladder papillary carcinoma (H); Hypertension goal BP (blood pressure) < 140/80; Bladder cancer (H); Bladder tumor; Abscess, bladder; Health Care Home; ACP (advance care planning); Rotator cuff syndrome of left shoulder; Right foot pain; Primary osteoarthritis of right knee; Underweight; Bilateral sensorineural hearing loss; Senile cataract of left eye; Hematuria; and Urinary retention on his problem list.  Review Of Systems    Skin: negative    Eyes: negative    Ears/Nose/Throat: negative    Respiratory: No shortness of breath, dyspnea on exertion, cough, or hemoptysis    Cardiovascular: negative    Gastrointestinal: negative    Genitourinary:  BLADDER CANCER AND COMPLICATED BY HEMATURIA     Musculoskeletal: joint pain and  OSTEOARTHRITIS RIGHT KNEE    Neurologic: negative    Psychiatric: negative    Hematologic/Lymphatic/Immunologic: negative    Endocrine: negative and  LDL OR \"BAD\" CHOLESTEROL  GOAL < 100     CURRENTLY 176 MG%                PE:  /64 (Cuff Size: Adult Regular)  Pulse 63  Temp 96.7  F (35.9  C) (Tympanic)  Resp 16  Wt 137 lb (62.1 kg)  SpO2 100%  BMI 20.83 kg/m2 Body mass index is 20.83 kg/(m^2).        Constitutional: general appearance, well nourished, well developed, in no acute distress, well developed, appears stated age, normal body habitus,          Eyes:; The patient has normal eyelids sclerae and conjunctivae :          Ears/Nose/Throat: external ear, overall: " normal appearance; external nose, overall: benign appearance, normal moujth gums and lips           Neck: thyroid, overall: normal size, normal consistency, nontender,          Respiratory:  palpation of chest, overall: normal excursion,    Clear to percussion and auscultation     NO Tachypnea    NORMAL  Color          Cardiovascular:  Good color with no peripheral edema    Regular sinus rhythm without murmur.  Physiologic heart sounds   Heart is unelarged    .     Chest/Breast: normal shape           Abdominal exam,  Liver and spleen are  unenlarged        Tenderness    Scars              Urogenital; no renal, flank or bladder  tenderness;          Lymphatic: neck nodes,     Other nodes         Musculoskeletal:  Brief ortho exam normal except:   OSTEOARTHRITIS LEFT KNEE PAIN         Integument: inspection of skin, no rash, lesions; and, palpation, no induration, no tenderness.          Neurologic mental status, overall: alert and oriented; gait, no ataxia, no unsteadiness; coordination, no tremors; cranial nerves, overall: normal motor, overall: normal bulk, tone.          Psychiatric: orientation/consciousness, overall: oriented to person, place and time; behavior/psychomotor activity, no tics, normal psychomotor activity; mood and affect, overall: normal mood and affect; appearance, overall: well-groomed, good eye contact; speech, overall: normal quality, no aphasia and normal quality, quantity, intact.        Diagnostic Test Results:  Results for orders placed or performed during the hospital encounter of 03/15/18   UA with Microscopic   Result Value Ref Range    Color Urine Red     Appearance Urine Cloudy     Glucose Urine Negative NEG^Negative mg/dL    Bilirubin Urine Negative NEG^Negative    Ketones Urine Negative NEG^Negative mg/dL    Specific Gravity Urine 1.012 1.003 - 1.035    Blood Urine Large (A) NEG^Negative    pH Urine 7.0 5.0 - 7.0 pH    Protein Albumin Urine 100 (A) NEG^Negative mg/dL     Urobilinogen mg/dL Normal 0.0 - 2.0 mg/dL    Nitrite Urine Negative NEG^Negative    Leukocyte Esterase Urine Negative NEG^Negative    Source Midstream Urine     WBC Urine 0 0 - 5 /HPF    RBC Urine >182 (H) 0 - 2 /HPF   CBC with platelets + differential   Result Value Ref Range    WBC 6.5 4.0 - 11.0 10e9/L    RBC Count 3.99 (L) 4.4 - 5.9 10e12/L    Hemoglobin 11.7 (L) 13.3 - 17.7 g/dL    Hematocrit 34.8 (L) 40.0 - 53.0 %    MCV 87 78 - 100 fl    MCH 29.3 26.5 - 33.0 pg    MCHC 33.6 31.5 - 36.5 g/dL    RDW 13.0 10.0 - 15.0 %    Platelet Count 198 150 - 450 10e9/L    Diff Method Automated Method     % Neutrophils 67.3 %    % Lymphocytes 19.9 %    % Monocytes 9.2 %    % Eosinophils 3.1 %    % Basophils 0.2 %    % Immature Granulocytes 0.3 %    Nucleated RBCs 0 0 /100    Absolute Neutrophil 4.4 1.6 - 8.3 10e9/L    Absolute Lymphocytes 1.3 0.8 - 5.3 10e9/L    Absolute Monocytes 0.6 0.0 - 1.3 10e9/L    Absolute Eosinophils 0.2 0.0 - 0.7 10e9/L    Absolute Basophils 0.0 0.0 - 0.2 10e9/L    Abs Immature Granulocytes 0.0 0 - 0.4 10e9/L    Absolute Nucleated RBC 0.0    Basic metabolic panel   Result Value Ref Range    Sodium 133 133 - 144 mmol/L    Potassium 4.0 3.4 - 5.3 mmol/L    Chloride 99 94 - 109 mmol/L    Carbon Dioxide 28 20 - 32 mmol/L    Anion Gap 6 3 - 14 mmol/L    Glucose 109 (H) 70 - 99 mg/dL    Urea Nitrogen 13 7 - 30 mg/dL    Creatinine 0.85 0.66 - 1.25 mg/dL    GFR Estimate 90 >60 mL/min/1.7m2    GFR Estimate If Black >90 >60 mL/min/1.7m2    Calcium 8.1 (L) 8.5 - 10.1 mg/dL   Hemoglobin   Result Value Ref Range    Hemoglobin 11.9 (L) 13.3 - 17.7 g/dL   Basic metabolic panel   Result Value Ref Range    Sodium 135 133 - 144 mmol/L    Potassium 4.3 3.4 - 5.3 mmol/L    Chloride 101 94 - 109 mmol/L    Carbon Dioxide 26 20 - 32 mmol/L    Anion Gap 8 3 - 14 mmol/L    Glucose 106 (H) 70 - 99 mg/dL    Urea Nitrogen 13 7 - 30 mg/dL    Creatinine 0.80 0.66 - 1.25 mg/dL    GFR Estimate >90 >60 mL/min/1.7m2    GFR Estimate  If Black >90 >60 mL/min/1.7m2    Calcium 8.4 (L) 8.5 - 10.1 mg/dL   CBC with platelets differential   Result Value Ref Range    WBC 9.3 4.0 - 11.0 10e9/L    RBC Count 4.01 (L) 4.4 - 5.9 10e12/L    Hemoglobin 11.8 (L) 13.3 - 17.7 g/dL    Hematocrit 34.7 (L) 40.0 - 53.0 %    MCV 87 78 - 100 fl    MCH 29.4 26.5 - 33.0 pg    MCHC 34.0 31.5 - 36.5 g/dL    RDW 13.1 10.0 - 15.0 %    Platelet Count 200 150 - 450 10e9/L    Diff Method Automated Method     % Neutrophils 74.7 %    % Lymphocytes 16.3 %    % Monocytes 8.3 %    % Eosinophils 0.3 %    % Basophils 0.1 %    % Immature Granulocytes 0.3 %    Nucleated RBCs 0 0 /100    Absolute Neutrophil 6.9 1.6 - 8.3 10e9/L    Absolute Lymphocytes 1.5 0.8 - 5.3 10e9/L    Absolute Monocytes 0.8 0.0 - 1.3 10e9/L    Absolute Eosinophils 0.0 0.0 - 0.7 10e9/L    Absolute Basophils 0.0 0.0 - 0.2 10e9/L    Abs Immature Granulocytes 0.0 0 - 0.4 10e9/L    Absolute Nucleated RBC 0.0    Glucose by meter   Result Value Ref Range    Glucose 106 (H) 70 - 99 mg/dL   Urine Culture   Result Value Ref Range    Specimen Description Midstream Urine     Special Requests Specimen received in preservative     Culture Micro No growth            ICD-10-CM    1. Pure hypercholesterolemia E78.00 atorvastatin (LIPITOR) 10 MG tablet     atorvastatin (LIPITOR) 10 MG tablet              .    Side effects benefits and risks thoroughly discussed. .he may come in early if unimproved or getting worse          Please drink 2 glasses of water prior to meals and walk 15-30 minutes after meals        I spent 25 MINUTES SPENT  with patient discussing the following issues   The encounter diagnosis was Pure hypercholesterolemia. over half of which involved counseling and coordination of care.      Patient Instructions     History   Smoking Status     Former Smoker   Smokeless Tobacco     Never Used     Comment: SMOKED BRIEFLY IN HIS 20'S     BP Readings from Last 1 Encounters:   03/20/18 116/64     Recent Labs   Lab Test   "12/07/17   0935  11/03/15   0930  05/28/15   0946   CHOL  270*  224*  185   HDL  78  70  67   LDL  178*  144*  107   TRIG  70  52  55   CHOLHDLRATIO   --   3.2  2.8     No results for input(s): A1C in the last 54245 hours.    The 10-year ASCVD risk score (Ketan SCHWARTZ Jr, et al., 2013) is: 15.9%    Values used to calculate the score:      Age: 67 years      Sex: Male      Is Non- : No      Diabetic: No      Tobacco smoker: No      Systolic Blood Pressure: 116 mmHg      Is BP treated: Yes      HDL Cholesterol: 78 mg/dL      Total Cholesterol: 270 mg/dL     (E78.00) Pure hypercholesterolemia  (primary encounter diagnosis)  Comment:    Plan: atorvastatin (LIPITOR) 10 MG tablet, one daily  Efra Crockett Jr., MD      What You Can Do to Reduce Cholesterol Levels  and Reduce Risk of Diabetes, Heart, and Blood Vessel Disease  1. Eat six to eight servings of green or root vegetables or fruit (raw or cooked) per day. You need 35 grams of fiber per day.  Examples: carrots apples  broccoli oranges  spinach grapefruit  lettuce pears  bananas  2. Use up to 2 cup of walnuts, almonds, or pecans as a source of \"good\" fat per day.  3. Drink one to three servings of soy milk (great for cereal) or 2 cup of soynuts per day.  4. Use margarine with reduced trans or saturated fats (e.g. Benecol, Smart Balance, olive oil margarine) as replacement for butter or margarine.  5. Eat fewer or half-portions of desserts, baked goods, chips, cookies, processed flour and sugar, pasta, butter, cream, non-skim dairy, ice cream.  6. Use olive or canola oil as the only oils for cooking and dressings.  7. Use one tablespoon of ground flaxseed or Natural Ovens \"Energy Mix\" per day (great on cereal or over salad).  8. Eat one to two servings per week of wild salmon or water-packed tuna.  9. Limit beef, lamb, and pork to at most one serving per day. (Range-fed beef, if you can get it, is much preferred and allows for greater use in " diet).  10. Eat three to four servings per day of whole grains (legumes, rice, wheat, oats).  11. Limit sodas and beer at most to three per week (only special occasions).  12. 65 percent of us need to lose weight and all of us need to keep moving! You should be walking at least 150 minutes per week. You should lose approximately seven percent of your weight in the next year if overweight. Check with me for more details.  1. Andrew Weil's paperback book, The Healthy Kitchen, is a great addition to your healthy lifestyle library.  2. American Diabetic Association (www.diabetes.org) - a wealth of information on nutritional excellence.  3. Other great websites for Mediterranean diets are available.                                     ALL THE ABOVE PROBLEMS ARE STABLE AND MED CHANGES AS NOTED        Diet:  MEDITERRANEAN DIET AND DIABETES         Exercise:     Exercises Range of motion, balance, isometric, and strengthening exercises 30 repetitions twice daily of involved joints            .EVERTON TRIPLETT MD 3/20/2018 6:04 PM  March 20, 2018

## 2018-03-20 NOTE — PATIENT INSTRUCTIONS
"History   Smoking Status     Former Smoker   Smokeless Tobacco     Never Used     Comment: SMOKED BRIEFLY IN HIS 20'S     BP Readings from Last 1 Encounters:   03/20/18 116/64     Recent Labs   Lab Test  12/07/17   0935  11/03/15   0930  05/28/15   0946   CHOL  270*  224*  185   HDL  78  70  67   LDL  178*  144*  107   TRIG  70  52  55   CHOLHDLRATIO   --   3.2  2.8     No results for input(s): A1C in the last 73926 hours.    The 10-year ASCVD risk score (Ketankhris SCHWARTZ Jr et al., 2013) is: 15.9%    Values used to calculate the score:      Age: 67 years      Sex: Male      Is Non- : No      Diabetic: No      Tobacco smoker: No      Systolic Blood Pressure: 116 mmHg      Is BP treated: Yes      HDL Cholesterol: 78 mg/dL      Total Cholesterol: 270 mg/dL     (E78.00) Pure hypercholesterolemia  (primary encounter diagnosis)  Comment:    Plan: atorvastatin (LIPITOR) 10 MG tablet, one daily  Efra Crockett Jr., MD      What You Can Do to Reduce Cholesterol Levels  and Reduce Risk of Diabetes, Heart, and Blood Vessel Disease  1. Eat six to eight servings of green or root vegetables or fruit (raw or cooked) per day. You need 35 grams of fiber per day.  Examples: carrots apples  broccoli oranges  spinach grapefruit  lettuce pears  bananas  2. Use up to 2 cup of walnuts, almonds, or pecans as a source of \"good\" fat per day.  3. Drink one to three servings of soy milk (great for cereal) or 2 cup of soynuts per day.  4. Use margarine with reduced trans or saturated fats (e.g. Benecol, Smart Balance, olive oil margarine) as replacement for butter or margarine.  5. Eat fewer or half-portions of desserts, baked goods, chips, cookies, processed flour and sugar, pasta, butter, cream, non-skim dairy, ice cream.  6. Use olive or canola oil as the only oils for cooking and dressings.  7. Use one tablespoon of ground flaxseed or Natural Ovens \"Energy Mix\" per day (great on cereal or over salad).  8. Eat one to two " servings per week of wild salmon or water-packed tuna.  9. Limit beef, lamb, and pork to at most one serving per day. (Range-fed beef, if you can get it, is much preferred and allows for greater use in diet).  10. Eat three to four servings per day of whole grains (legumes, rice, wheat, oats).  11. Limit sodas and beer at most to three per week (only special occasions).  12. 65 percent of us need to lose weight and all of us need to keep moving! You should be walking at least 150 minutes per week. You should lose approximately seven percent of your weight in the next year if overweight. Check with me for more details.  1. Andrew Weil's paperback book, The Healthy Kitchen, is a great addition to your healthy lifestyle library.  2. American Diabetic Association (www.diabetes.org)   a wealth of information on nutritional excellence.  3. Other great websites for Mediterranean diets are available.

## 2018-03-20 NOTE — TELEPHONE ENCOUNTER
"ED/Discharge Protocol    \"Hi, my name is Mulu Hester, a registered nurse, and I am calling on behalf of Dr. Crockett's office at [facility].  I am calling to follow up and see how things are going for you after your recent visit.\"    \"I see that you were in the (ER/UC/IP) on 3/16/2018    How are you doing now that you are home?\" good    Is patient experiencing symptoms that may require a hospital visit?  no    Discharge Instructions    \"Let's review your discharge instructions.  What is/are the follow-up recommendations?  Pt. Response: Pt is coming in to see Dr Crockett today    \"Were you instructed to make a follow-up appointment?\"  Pt. Response: Yes.  Has appointment been made?   Yes      \"When you see the provider, I would recommend that you bring your discharge instructions with you.    Medications    \"How many new medications are you on since your hospitalization/ED visit?\"    0-1  \"How many of your current medicines changed (dose, timing, name, etc.) while you were in the hospital/ED visit?\"   0-1  \"Do you have questions about your medications?\"   No  \"Were you newly diagnosed with heart failure, COPD, diabetes or did you have a heart attack?\"   No  For patients on insulin: \"Did you start on insulin in the hospital or did you have your insulin dose changed?\"   No    Medication reconciliation completed? No, due to coming in to see Dr grigsby    Was MTM referral placed (*Make sure to put transitions as reason for referral)?   No    Call Summary    \"Do you have any questions or concerns about your condition or care plan at the moment?\"    No  Triage nurse advice given: keep appt    Patient was in ER 2   x in the past year (assess appropriateness of ER visits.)      \"If you have questions or things don't continue to improve, we encourage you contact us through the main clinic number,  [phone].  Even if the clinic is not open, triage nurses are available 24/7 to help you.     We would like you to know that " "our clinic has extended hours (provide information).  We also have urgent care (provide details on closest location and hours/contact info)\"      \"Thank you for your time and take care!\"        "

## 2018-03-20 NOTE — MR AVS SNAPSHOT
"              After Visit Summary   3/20/2018    Lobo Isaacs    MRN: [id number]           Patient Information     Date Of Birth          1950        Visit Information        Provider Department      3/20/2018 2:30 PM Efra Crockett MD [facility]        Today's Diagnoses     Pure hypercholesterolemia    -  1      Care Instructions    History   Smoking Status     Former Smoker   Smokeless Tobacco     Never Used     Comment: SMOKED BRIEFLY IN HIS 20'S     BP Readings from Last 1 Encounters:   03/20/18 116/64     Recent Labs   Lab Test  12/07/17   0935  11/03/15   0930  05/28/15   0946   CHOL  270*  224*  185   HDL  78  70  67   LDL  178*  144*  107   TRIG  70  52  55   CHOLHDLRATIO   --   3.2  2.8     No results for input(s): A1C in the last 38975 hours.    The 10-year ASCVD risk score (Ketan SCHWARTZ Jr, et al., 2013) is: 15.9%    Values used to calculate the score:      Age: 67 years      Sex: Male      Is Non- : No      Diabetic: No      Tobacco smoker: No      Systolic Blood Pressure: 116 mmHg      Is BP treated: Yes      HDL Cholesterol: 78 mg/dL      Total Cholesterol: 270 mg/dL     (E78.00) Pure hypercholesterolemia  (primary encounter diagnosis)  Comment:    Plan: atorvastatin (LIPITOR) 10 MG tablet, one daily  Efra Crockett Jr., MD      What You Can Do to Reduce Cholesterol Levels  and Reduce Risk of Diabetes, Heart, and Blood Vessel Disease  1. Eat six to eight servings of green or root vegetables or fruit (raw or cooked) per day. You need 35 grams of fiber per day.  Examples: carrots apples  broccoli oranges  spinach grapefruit  lettuce pears  bananas  2. Use up to 2 cup of walnuts, almonds, or pecans as a source of \"good\" fat per day.  3. Drink one to three servings of soy milk (great for cereal) or 2 cup of soynuts per day.  4. Use margarine with reduced trans or saturated fats (e.g. Benecol, Smart Balance, olive oil margarine) as " "replacement for butter or margarine.  5. Eat fewer or half-portions of desserts, baked goods, chips, cookies, processed flour and sugar, pasta, butter, cream, non-skim dairy, ice cream.  6. Use olive or canola oil as the only oils for cooking and dressings.  7. Use one tablespoon of ground flaxseed or Natural Ovens \"Energy Mix\" per day (great on cereal or over salad).  8. Eat one to two servings per week of wild salmon or water-packed tuna.  9. Limit beef, lamb, and pork to at most one serving per day. (Range-fed beef, if you can get it, is much preferred and allows for greater use in diet).  10. Eat three to four servings per day of whole grains (legumes, rice, wheat, oats).  11. Limit sodas and beer at most to three per week (only special occasions).  12. 65 percent of us need to lose weight and all of us need to keep moving! You should be walking at least 150 minutes per week. You should lose approximately seven percent of your weight in the next year if overweight. Check with me for more details.  1. Andrew Weil's paperback book, The Healthy Kitchen, is a great addition to your healthy lifestyle library.  2. American Diabetic Association (www.diabetes.org) - a wealth of information on nutritional excellence.  3. Other great websites for Mediterranean diets are available.                                   Follow-ups after your visit        Follow-up notes from your care team     Return in about 3 months (around 6/20/2018).      Your next 10 appointments already scheduled     Mar 26, 2018 10:20 AM CDT   Return Visit with Cody Torres MD   [facility] ([facility])    [address]  [address]  [facility] [address] [phone]              Who to contact     If you have questions or need follow up information about today's clinic visit or your schedule please contact [facility] directly at [phone].  Normal or " "non-critical lab and imaging results will be communicated to you by MyChart, letter or phone within 4 business days after the clinic has received the results. If you do not hear from us within 7 days, please contact the clinic through Conclusive Analyticst or phone. If you have a critical or abnormal lab result, we will notify you by phone as soon as possible.  Submit refill requests through [employer]-Bio or call your pharmacy and they will forward the refill request to us. Please allow 3 business days for your refill to be completed.          Additional Information About Your Visit        PBS-Bio Information     PBS-Bio lets you send messages to your doctor, view your test results, renew your prescriptions, schedule appointments and more. To sign up, go to [email or web address] . Click on \"Log in\" on the left side of the screen, which will take you to the Welcome page. Then click on \"Sign up Now\" on the right side of the page.     You will be asked to enter the access code listed below, as well as some personal information. Please follow the directions to create your username and password.     Your access code is: [email or web address]  Expires: 2018  9:37 AM     Your access code will  in 90 days. If you need help or a new code, please call your [place] clinic or [phone].        Care EveryWhere ID     This is your Care EveryWhere ID. This could be used by other organizations to access your [place] medical records  [id number]        Your Vitals Were     Pulse Temperature Respirations Pulse Oximetry BMI (Body Mass Index)       63 96.7  F (35.9  C) (Tympanic) 16 100% 20.83 kg/m2        Blood Pressure from Last 3 Encounters:   18 116/64   18 152/55   18 126/71    Weight from Last 3 Encounters:   18 137 lb (62.1 kg)   03/15/18 139 lb (63 kg)   18 133 lb 8 oz (60.6 kg)              Today, you had the following     No orders found for display         Today's Medication Changes          These changes " are accurate as of 3/20/18  3:04 PM.  If you have any questions, ask your nurse or doctor.               Start taking these medicines.        Dose/Directions    * atorvastatin 10 MG tablet   Commonly known as:  LIPITOR   Used for:  Pure hypercholesterolemia   Started by:  Efra Crockett MD        Dose:  10 mg   Take 1 tablet (10 mg) by mouth daily   Quantity:  30 tablet   Refills:  11       * atorvastatin 10 MG tablet   Commonly known as:  LIPITOR   Used for:  Pure hypercholesterolemia   Started by:  Efra Crockett MD        Dose:  10 mg   Take 1 tablet (10 mg) by mouth daily   Quantity:  30 tablet   Refills:  11       * Notice:  This list has 2 medication(s) that are the same as other medications prescribed for you. Read the directions carefully, and ask your doctor or other care provider to review them with you.         Where to get your medicines      These medications were sent to [facility] [address] [place] [address]     Phone:  [phone]     atorvastatin 10 MG tablet    atorvastatin 10 MG tablet                Primary Care Provider Office Phone # Fax #    Efra Crockett [phone]       [address] [facility] [address]        Equal Access to Services     NAS BARRON AH: Hadii jan ku hadasho Soomaali, waaxda luqadaha, qaybta kaalmada adeegyada, waxay latricein thangn stu melton. So [facility] [phone].    ATENCIÓN: Si habla español, tiene a diaz disposición servicios gratuitos de asistencia lingüística. Llame al [phone].    We comply with applicable federal civil rights laws and [place] laws. We do not discriminate on the basis of race, color, national origin, age, disability, sex, sexual orientation, or gender identity.            Thank you!     Thank you for choosing [facility]  for your care. Our goal is always to provide you with  excellent care. Hearing back from our patients is one way we can continue to improve our services. Please take a few minutes to complete the written survey that you may receive in the mail after your visit with us. Thank you!             Your Updated Medication List - Protect others around you: Learn how to safely use, store and throw away your medicines at www.disposemymeds.org.          This list is accurate as of 3/20/18  3:04 PM.  Always use your most recent med list.                   Brand Name Dispense Instructions for use Diagnosis    * atorvastatin 10 MG tablet    LIPITOR    30 tablet    Take 1 tablet (10 mg) by mouth daily    Pure hypercholesterolemia       * atorvastatin 10 MG tablet    LIPITOR    30 tablet    Take 1 tablet (10 mg) by mouth daily    Pure hypercholesterolemia       metoprolol succinate 50 MG 24 hr tablet    TOPROL-XL    90 tablet    Take 1 tablet (50 mg) by mouth daily    Hypertension goal BP (blood pressure) < 140/80       tamsulosin 0.4 MG capsule    FLOMAX    30 capsule    Take 1 capsule (0.4 mg) by mouth daily    Urinary retention with incomplete bladder emptying       TYLENOL PO      Take 500 mg by mouth every 4 hours as needed for mild pain or fever        * Notice:  This list has 2 medication(s) that are the same as other medications prescribed for you. Read the directions carefully, and ask your doctor or other care provider to review them with you.

## 2018-03-20 NOTE — NURSING NOTE
"Chief Complaint   Patient presents with     Lab Result Notice     lab results from 3/6       Initial /64 (Cuff Size: Adult Regular)  Pulse 63  Temp 96.7  F (35.9  C) (Tympanic)  Resp 16  Wt 137 lb (62.1 kg)  SpO2 100%  BMI 20.83 kg/m2 Estimated body mass index is 20.83 kg/(m^2) as calculated from the following:    Height as of 3/15/18: 5' 8\" (1.727 m).    Weight as of this encounter: 137 lb (62.1 kg).  Medication Reconciliation: complete   Yenny Jackson CMA    "

## 2018-03-26 ENCOUNTER — OFFICE VISIT (OUTPATIENT)
Dept: UROLOGY | Facility: CLINIC | Age: 68
End: 2018-03-26
Payer: COMMERCIAL

## 2018-03-26 VITALS
SYSTOLIC BLOOD PRESSURE: 122 MMHG | OXYGEN SATURATION: 98 % | HEART RATE: 66 BPM | DIASTOLIC BLOOD PRESSURE: 70 MMHG | WEIGHT: 137 LBS | BODY MASS INDEX: 20.76 KG/M2 | HEIGHT: 68 IN

## 2018-03-26 DIAGNOSIS — C67.2 MALIGNANT NEOPLASM OF LATERAL WALL OF URINARY BLADDER (H): Primary | ICD-10-CM

## 2018-03-26 PROCEDURE — 99214 OFFICE O/P EST MOD 30 MIN: CPT | Performed by: UROLOGY

## 2018-03-26 ASSESSMENT — PAIN SCALES - GENERAL: PAINLEVEL: NO PAIN (0)

## 2018-03-26 NOTE — PROGRESS NOTES
This very pleasant 67-year-old gentleman returns today for review of the pathology report.  Initially had presented with gross hematuria with very extensive involvement of the bladder by papillary tumors and he had have a very extensive resection.  He did have an extraperitoneal leak at first and a catheter was left in for a prolonged period until a cystogram which showed no evidence of residual leak.  The pathology showed low-grade noninvasive bladder cancer.  He has done very well since but recently a check cystoscopy we did notice a patch which was a low and lateral to the right ureter consistent with a medium sized flat recurrence.  This area was biopsied and fulgurated and 3 other biopsies were taken from other areas of the bladder which seemed a little suspicious.        SPECIMEN(S):   A: Bladder, anterior wall   B: Bladder, left lateral wall   C: Bladder, posterior wall   D: Bladder tumor     FINAL DIAGNOSIS:   A: Urinary bladder, anterior wall, biopsy: Negative for malignancy.     B: Urinary bladder, left lateral wall, biopsy: Negative for malignancy.     C: Urinary bladder, posterior wall, biopsy: Negative for malignancy.     D: Urinary bladder, tumor: Non-invasive low-grade papillary urothelial   carcinoma.  See synoptic summary.     Report Name: Urinary Bladder - Biopsy; TURBT        Status: Submitted Checklist Inst: 1      Last Updated By: Davion Cote M.D., 03/09/2018 14:26:07   Part(s) Involved:   D: Bladder tumor     Synoptic Report:     SPECIMEN     Procedure:         - TURBT     TUMOR     Tumor Site:         - Not specified     Histologic Type:         - Papillary urothelial carcinoma, noninvasive     Histologic Grade:         - Low-grade     Tumor Extent       Tumor Extension:           - Noninvasive papillary carcinoma     Accessory Findings       Muscularis Propria Presence:           - Muscularis propria (detrusor muscle) present       Lymphovascular Invasion:           - Not identified  "    2017 June AJCC 8th Edition CAP Annual Release     Electronically signed out by:     Davion Cote M.D.       I am reassured by this report and that the only area of involvement was the area that appeared to be tumor, is both low-grade and noninvasive.  I discussed the situation very carefully with the patient and his wife in the office today and after careful discussions about the options of merely observation, and then the option also considering intravesical chemotherapy to complement observation, we decided to proceed with observation alone for the time being.    Therefore we will plan for cystoscopy and urine cytology in 3 months.    If there is evidence of early recurrence we will of course then be planning some type of intravesical treatment.    I did discuss the entire situation with the patient and his wife in detail today.  I answered all the questions    Plan.  Cystoscopy and cytology in 3 months.    Time.  30 minutes.  Greater than 50% in discussion and consultation, extensive review of records lengthy discussion of pathology report and previous pathology reports with discussion.    \"This dictation was performed with voice recognition software and may contain errors,  omissions and inadvertent word substitution.\"    "

## 2018-03-26 NOTE — MR AVS SNAPSHOT
"              After Visit Summary   3/26/2018    Lobo Isaacs    MRN: [id number]           Patient Information     Date Of Birth          1950        Visit Information        Provider Department      3/26/2018 10:20 AM Cody Torres MD [facility]         Follow-ups after your visit        Follow-up notes from your care team     Return in about 3 months (around 6/26/2018) for cystoscopy.      Your next 10 appointments already scheduled     Jun 18, 2018  8:30 AM CDT   Cystoscopy with Cody Torres MD, UA CYF   [facility] ([facility] [place])    [address]  [address]  [facility] [address] [phone]              Who to contact     If you have questions or need follow up information about today's clinic visit or your schedule please contact [facility] directly at [phone].  Normal or non-critical lab and imaging results will be communicated to you by Aeropostalehart, letter or phone within 4 business days after the clinic has received the results. If you do not hear from us within 7 days, please contact the clinic through Aeropostalehart or phone. If you have a critical or abnormal lab result, we will notify you by phone as soon as possible.  Submit refill requests through [employer] or call your pharmacy and they will forward the refill request to us. Please allow 3 business days for your refill to be completed.          Additional Information About Your Visit        Aeropostalehart Information     [employer] lets you send messages to your doctor, view your test results, renew your prescriptions, schedule appointments and more. To sign up, go to [email or web address] . Click on \"Log in\" on the left side of the screen, which will take you to the Welcome page. Then click on \"Sign up Now\" on the right side of the page.     You will be asked to enter the access code listed below, as " "well as some personal information. Please follow the directions to create your username and password.     Your access code is: [email or web address]  Expires: 2018  9:37 AM     Your access code will  in 90 days. If you need help or a new code, please call your [place] clinic or [phone].        Care EveryWhere ID     This is your Care EveryWhere ID. This could be used by other organizations to access your [place] medical records  [id number]        Your Vitals Were     Pulse Height Pulse Oximetry BMI (Body Mass Index)          66 1.727 m (5' 8\") 98% 20.83 kg/m2         Blood Pressure from Last 3 Encounters:   18 122/70   18 116/64   18 152/55    Weight from Last 3 Encounters:   18 62.1 kg (137 lb)   18 62.1 kg (137 lb)   03/15/18 63 kg (139 lb)              Today, you had the following     No orders found for display       Primary Care Provider Office Phone # Fax #    Efra Jasmina Crockett [phone]       [address] [facility] [address]        Equal Access to Services     NAS BARRON : Hadii jan ordriguez hadasho Sodawnaali, waaxda luqadaha, qaybta kaalmada adeegyada, purnima melton. So [facility] [phone].    ATENCIÓN: Si habla español, tiene a diaz disposición servicios gratuitos de asistencia lingüística. Llame al [phone].    We comply with applicable federal civil rights laws and [place] laws. We do not discriminate on the basis of race, color, national origin, age, disability, sex, sexual orientation, or gender identity.            Thank you!     Thank you for choosing [facility] [place]  for your care. Our goal is always to provide you with excellent care. Hearing back from our patients is one way we can continue to improve our services. Please take a few minutes to complete the written survey that you may receive in the mail after your visit with us. Thank you!             Your Updated " Medication List - Protect others around you: Learn how to safely use, store and throw away your medicines at www.disposemymeds.org.          This list is accurate as of 3/26/18 10:30 AM.  Always use your most recent med list.                   Brand Name Dispense Instructions for use Diagnosis    * atorvastatin 10 MG tablet    LIPITOR    30 tablet    Take 1 tablet (10 mg) by mouth daily    Pure hypercholesterolemia       * atorvastatin 10 MG tablet    LIPITOR    30 tablet    Take 1 tablet (10 mg) by mouth daily    Pure hypercholesterolemia       metoprolol succinate 50 MG 24 hr tablet    TOPROL-XL    90 tablet    Take 1 tablet (50 mg) by mouth daily    Hypertension goal BP (blood pressure) < 140/80       tamsulosin 0.4 MG capsule    FLOMAX    30 capsule    Take 1 capsule (0.4 mg) by mouth daily    Urinary retention with incomplete bladder emptying       TYLENOL PO      Take 500 mg by mouth every 4 hours as needed for mild pain or fever        * Notice:  This list has 2 medication(s) that are the same as other medications prescribed for you. Read the directions carefully, and ask your doctor or other care provider to review them with you.

## 2018-03-26 NOTE — LETTER
3/26/2018       RE: Lobo Isaacs  [address]  [facility] [address]     Dear Colleague,    Thank you for referring your patient, Lobo Isaacs, to the [facility] [place] at [facility]. Please see a copy of my visit note below.    This very pleasant 67-year-old gentleman returns today for review of the pathology report.  Initially had presented with gross hematuria with very extensive involvement of the bladder by papillary tumors and he had have a very extensive resection.  He did have an extraperitoneal leak at first and a catheter was left in for a prolonged period until a cystogram which showed no evidence of residual leak.  The pathology showed low-grade noninvasive bladder cancer.  He has done very well since but recently a check cystoscopy we did notice a patch which was a low and lateral to the right ureter consistent with a medium sized flat recurrence.  This area was biopsied and fulgurated and 3 other biopsies were taken from other areas of the bladder which seemed a little suspicious.        SPECIMEN(S):   A: Bladder, anterior wall   B: Bladder, left lateral wall   C: Bladder, posterior wall   D: Bladder tumor     FINAL DIAGNOSIS:   A: Urinary bladder, anterior wall, biopsy: Negative for malignancy.     B: Urinary bladder, left lateral wall, biopsy: Negative for malignancy.     C: Urinary bladder, posterior wall, biopsy: Negative for malignancy.     D: Urinary bladder, tumor: Non-invasive low-grade papillary urothelial   carcinoma.  See synoptic summary.     Report Name: Urinary Bladder - Biopsy; TURBT        Status: Submitted Checklist Inst: 1      Last Updated By: Davion Cote M.D., 03/09/2018 14:26:07   Part(s) Involved:   D: Bladder tumor     Synoptic Report:     SPECIMEN     Procedure:         - TURBT     TUMOR     Tumor Site:         - Not specified     Histologic Type:         - Papillary urothelial  "carcinoma, noninvasive     Histologic Grade:         - Low-grade     Tumor Extent       Tumor Extension:           - Noninvasive papillary carcinoma     Accessory Findings       Muscularis Propria Presence:           - Muscularis propria (detrusor muscle) present       Lymphovascular Invasion:           - Not identified     2017 June AJCC 8th Edition CAP Annual Release     Electronically signed out by:     Davion Cote M.D.       I am reassured by this report and that the only area of involvement was the area that appeared to be tumor, is both low-grade and noninvasive.  I discussed the situation very carefully with the patient and his wife in the office today and after careful discussions about the options of merely observation, and then the option also considering intravesical chemotherapy to complement observation, we decided to proceed with observation alone for the time being.    Therefore we will plan for cystoscopy and urine cytology in 3 months.    If there is evidence of early recurrence we will of course then be planning some type of intravesical treatment.    I did discuss the entire situation with the patient and his wife in detail today.  I answered all the questions    Plan.  Cystoscopy and cytology in 3 months.    Time.  30 minutes.  Greater than 50% in discussion and consultation, extensive review of records lengthy discussion of pathology report and previous pathology reports with discussion.    \"This dictation was performed with voice recognition software and may contain errors,  omissions and inadvertent word substitution.\"      Again, thank you for allowing me to participate in the care of your patient.      Sincerely,    Cody Torres MD      "

## 2018-03-31 ENCOUNTER — HEALTH MAINTENANCE LETTER (OUTPATIENT)
Age: 68
End: 2018-03-31

## 2018-05-18 DIAGNOSIS — E78.00 PURE HYPERCHOLESTEROLEMIA: ICD-10-CM

## 2018-05-18 NOTE — TELEPHONE ENCOUNTER
"Requested Prescriptions   Pending Prescriptions Disp Refills     atorvastatin (LIPITOR) 10 MG tablet  PATIENT REQUESTING A 90 DAY SUPPLY.   Last Written Prescription Date:  3/20/18  Last Fill Quantity: 30 TABLET,  # refills: 11   Last office visit: 3/20/2018 with prescribing provider:  FANI TRIPLETT   Future Office Visit:     30 tablet 11     Sig: Take 1 tablet (10 mg) by mouth daily    Statins Protocol Passed    5/18/2018 10:36 AM       Passed - LDL on file in past 12 months    Recent Labs   Lab Test  12/07/17   0935   LDL  178*            Passed - No abnormal creatine kinase in past 12 months    No lab results found.            Passed - Recent (12 mo) or future (30 days) visit within the authorizing provider's specialty    Patient had office visit in the last 12 months or has a visit in the next 30 days with authorizing provider or within the authorizing provider's specialty.  See \"Patient Info\" tab in inbasket, or \"Choose Columns\" in Meds & Orders section of the refill encounter.           Passed - Patient is age 18 or older          "

## 2018-05-21 RX ORDER — ATORVASTATIN CALCIUM 10 MG/1
10 TABLET, FILM COATED ORAL DAILY
Qty: 90 TABLET | Refills: 1 | Status: SHIPPED | OUTPATIENT
Start: 2018-05-21 | End: 2019-02-25

## 2018-06-14 DIAGNOSIS — C67.9 BLADDER CANCER (H): Primary | ICD-10-CM

## 2018-06-18 ENCOUNTER — OFFICE VISIT (OUTPATIENT)
Dept: UROLOGY | Facility: CLINIC | Age: 68
End: 2018-06-18
Payer: COMMERCIAL

## 2018-06-18 VITALS
BODY MASS INDEX: 20.46 KG/M2 | OXYGEN SATURATION: 100 % | HEIGHT: 68 IN | SYSTOLIC BLOOD PRESSURE: 138 MMHG | DIASTOLIC BLOOD PRESSURE: 74 MMHG | WEIGHT: 135 LBS | HEART RATE: 71 BPM

## 2018-06-18 DIAGNOSIS — C67.9 BLADDER CANCER (H): ICD-10-CM

## 2018-06-18 DIAGNOSIS — Z79.2 PROPHYLACTIC ANTIBIOTIC: Primary | ICD-10-CM

## 2018-06-18 DIAGNOSIS — Z85.51 PERSONAL HISTORY OF MALIGNANT NEOPLASM OF BLADDER: ICD-10-CM

## 2018-06-18 LAB
ALBUMIN UR-MCNC: ABNORMAL MG/DL
APPEARANCE UR: CLEAR
BILIRUB UR QL STRIP: NEGATIVE
COLOR UR AUTO: YELLOW
GLUCOSE UR STRIP-MCNC: NEGATIVE MG/DL
HGB UR QL STRIP: ABNORMAL
KETONES UR STRIP-MCNC: NEGATIVE MG/DL
LEUKOCYTE ESTERASE UR QL STRIP: ABNORMAL
NITRATE UR QL: NEGATIVE
PH UR STRIP: 6.5 PH (ref 5–7)
SOURCE: ABNORMAL
SP GR UR STRIP: 1.02 (ref 1–1.03)
UROBILINOGEN UR STRIP-ACNC: 0.2 EU/DL (ref 0.2–1)

## 2018-06-18 PROCEDURE — 52000 CYSTOURETHROSCOPY: CPT | Performed by: UROLOGY

## 2018-06-18 PROCEDURE — 00000103 ZZHCL STATISTIC CYTO-FISH QC 88120: Performed by: UROLOGY

## 2018-06-18 PROCEDURE — 88112 CYTOPATH CELL ENHANCE TECH: CPT | Performed by: UROLOGY

## 2018-06-18 PROCEDURE — 81003 URINALYSIS AUTO W/O SCOPE: CPT | Performed by: UROLOGY

## 2018-06-18 RX ORDER — CIPROFLOXACIN 500 MG/1
500 TABLET, FILM COATED ORAL ONCE
Qty: 1 TABLET | Refills: 0 | Status: SHIPPED | OUTPATIENT
Start: 2018-06-18 | End: 2019-02-19

## 2018-06-18 ASSESSMENT — PAIN SCALES - GENERAL: PAINLEVEL: NO PAIN (0)

## 2018-06-18 NOTE — MR AVS SNAPSHOT
"              After Visit Summary   6/18/2018    Lobo Isaacs    MRN: [id number]           Patient Information     Date Of Birth          1950        Visit Information        Provider Department      6/18/2018 8:00 AM Cody Torres MD; [facility]        Today's Diagnoses     Prophylactic antibiotic    -  1      Care Instructions         AFTER YOUR CYSTOSCOPY         You have just completed a cystoscopy, or \"cysto\", which allowed your physician to learn more about your bladder (or to remove a stent placed after surgery). We suggest that you continue to avoid caffeine, fruit juice, and alcohol for the next 24 hours, however, you are encouraged to return to your normal activities.       A few things that are considered normal after your cystoscopy:    * small amount of bleeding (or spotting) that clears within the next 24 hours    * slight burning sensation with urination    * sensation to of needing to avoid more frequently    * the feeling of \"air\" in your urine    * mild discomfort that is relieved with Tylonol        Please contact our office promptly if you:    * develop a fever above 101 degrees    * are unable to urinate    * develop bright red blood that does not stop    * severe pain or swelling        And of course, please contact our office with any concerns or questions [phone]          Follow-ups after your visit        Your next 10 appointments already scheduled     Sep 17, 2018  8:00 AM CDT   Cystoscopy with Cody Torres MD, [facility] ([facility])    [address]  [address]  [facility] [address] [phone]              Who to contact     If you have questions or need follow up information about today's clinic visit or your schedule please contact [facility] [place] directly at [phone].  Normal or non-critical lab and " "imaging results will be communicated to you by MyChart, letter or phone within 4 business days after the clinic has received the results. If you do not hear from us within 7 days, please contact the clinic through [employer] or phone. If you have a critical or abnormal lab result, we will notify you by phone as soon as possible.  Submit refill requests through [employer] or call your pharmacy and they will forward the refill request to us. Please allow 3 business days for your refill to be completed.          Additional Information About Your Visit        [employer] Information     [employer] lets you send messages to your doctor, view your test results, renew your prescriptions, schedule appointments and more. To sign up, go to [email or web address] . Click on \"Log in\" on the left side of the screen, which will take you to the Welcome page. Then click on \"Sign up Now\" on the right side of the page.     You will be asked to enter the access code listed below, as well as some personal information. Please follow the directions to create your username and password.     Your access code is: [email or web address]  Expires: 2018  8:33 AM     Your access code will  in 90 days. If you need help or a new code, please call your [place] clinic or [phone].        Care EveryWhere ID     This is your Care EveryWhere ID. This could be used by other organizations to access your [place] medical records  [id number]        Your Vitals Were     Pulse Height Pulse Oximetry BMI (Body Mass Index)          71 1.727 m (5' 8\") 100% 20.53 kg/m2         Blood Pressure from Last 3 Encounters:   18 138/74   18 122/70   18 116/64    Weight from Last 3 Encounters:   18 61.2 kg (135 lb)   18 62.1 kg (137 lb)   18 62.1 kg (137 lb)              Today, you had the following     No orders found for display         Today's Medication Changes          These changes are accurate as of 18  8:33 AM.  If you have any " questions, ask your nurse or doctor.               Start taking these medicines.        Dose/Directions    ciprofloxacin 500 MG tablet   Commonly known as:  CIPRO   Used for:  Prophylactic antibiotic   Started by:  Cody Torres MD        Dose:  500 mg   Take 1 tablet (500 mg) by mouth once for 1 dose   Quantity:  1 tablet   Refills:  0            Where to get your medicines      These medications were sent to [facility] [address] [place] [address] [address] [facility]  [address] [facility] [place] [place] [address]     Phone:  [phone]     ciprofloxacin 500 MG tablet                Primary Care Provider Office Phone # Fax #    Efra Jasmina Crockett [phone]       [address]  [facility] [address]        Equal Access to Services     NAS BARRON : Hadii jan rodriguez hadasho Sodawnaali, waaxda luqadaha, qaybta kaalmada adeegyada, waxay hany melton. So [facility] [phone].    ATENCIÓN: Si habla español, tiene a diaz disposición servicios gratuitos de asistencia lingüística. [facility] [phone].    We comply with applicable federal civil rights laws and [place] laws. We do not discriminate on the basis of race, color, national origin, age, disability, sex, sexual orientation, or gender identity.            Thank you!     Thank you for choosing [facility] [place]  for your care. Our goal is always to provide you with excellent care. Hearing back from our patients is one way we can continue to improve our services. Please take a few minutes to complete the written survey that you may receive in the mail after your visit with us. Thank you!             Your Updated Medication List - Protect others around you: Learn how to safely use, store and throw away your medicines at www.disposemymeds.org.          This list is accurate as of 6/18/18  8:33 AM.  Always use your most recent med list.                   Brand Name Dispense  Instructions for use Diagnosis    * atorvastatin 10 MG tablet    LIPITOR    30 tablet    Take 1 tablet (10 mg) by mouth daily    Pure hypercholesterolemia       * atorvastatin 10 MG tablet    LIPITOR    90 tablet    Take 1 tablet (10 mg) by mouth daily    Pure hypercholesterolemia       ciprofloxacin 500 MG tablet    CIPRO    1 tablet    Take 1 tablet (500 mg) by mouth once for 1 dose    Prophylactic antibiotic       metoprolol succinate 50 MG 24 hr tablet    TOPROL-XL    90 tablet    Take 1 tablet (50 mg) by mouth daily    Hypertension goal BP (blood pressure) < 140/80       tamsulosin 0.4 MG capsule    FLOMAX    30 capsule    Take 1 capsule (0.4 mg) by mouth daily    Urinary retention with incomplete bladder emptying       TYLENOL PO      Take 500 mg by mouth every 4 hours as needed for mild pain or fever        * Notice:  This list has 2 medication(s) that are the same as other medications prescribed for you. Read the directions carefully, and ask your doctor or other care provider to review them with you.

## 2018-06-18 NOTE — NURSING NOTE
Chief Complaint   Patient presents with     Cystoscopy     Pt here for cysto due to bladder cancer     Prior to the start of the procedure and with procedural staff participation, I verbally confirmed the patient s identity using two indicators, relevant allergies, that the procedure was appropriate and matched the consent or emergent situation, and that the correct equipment/implants were available. Immediately prior to starting the procedure I conducted the Time Out with the procedural staff and re-confirmed the patient s name, procedure, and site/side. I have wiped the patient off with the povidone-Iodine solution, draped them,  used Lidocaine hydrochloride jelly, and instilled sterile water into the bladder. (The Joint Commission universal protocol was followed.)  Yes    Sedation (Moderate or Deep): None    Darlin Landa CMA

## 2018-06-18 NOTE — PROGRESS NOTES
This very pleasant 67-year-old gentleman returns today for check cystoscopy.  We recall he had presented with gross hematuria in 2015 and was found to have very extensive involvement of the bladder by superficial bladder cancer.  This was extensively resected at that time and following this procedure we were concerned about the possibility of a bladder leak.  Although subsequently a cystogram showed did not confirm this we did leave the catheter in initially for an extended period  Pathology was low-grade superficial bladder cancer  Follow-up examination of the bladder 3 months later did show additional tumor which was resected, which also was low-grade superficial bladder cancer.  Since then he has had very little evidence of recurrence of disease until 3 months ago when a small patch in the bladder was concerning but was biopsied and showed very superficial low-grade disease in a small area.  He returns today for check cystoscopy.     Procedure.  Cystoscopy.   Surgeon.    Melissa  Anesthesia.  Local anesthesia.  Description.  With the patient in the supine position, with the genital area prepped and draped in the customary fashion, with local anesthetic and the urethra, the flexible cystoscope was Inserted.  The penile urethra is normal.  The external sphincter is intact.  The prostatic urethra shows evidence of an open bladder neck following resection.  The anterior the bladder is carefully inspected.  There are some areas still indicating inflammation from the previous fulguration 3 months ago.  I see no evidence of new tumor.  There is no evidence of stone formation.  There is no major trabeculation.  There are no other remarkable features.    Impression.  We will quite clearly need to follow this very closely.  We will send a urine for FISH test today.  We will see him again in 3 months to repeat cystoscopy and fish test.  We did have a careful discussion today about the need for long-term surveillance, in  "particular because of the extensive disease we encountered 3 years ago.  He is voiding well with minimal frequency and nocturia and no other complaints.  It is possible should further disease be identified we will  need to plan for intravesical treatment of some sort probably BCG.  We did therefore have a discussion about the use of BCG and some of its benefits and potential risks of drawbacks.  At this point we will continue surveillance alone and will see him in 3 months for cystoscopy repeat fish test.  If the fish test is positive on this occasion today and we may need to consider bladder biopsies prior to that time.  I did discuss the entire situation with the patient in detail today.  I answered all his questions.    Plan.  3 months for cystoscopy and fish test.    Time.  I did spend 15  minutes in addition to the procedure to previous records thoroughly, to go over the findings today and talk about the need for close follow-up and potential alternative treatment strategies based on the future findings.  In addition we did have a discussion about other urinary symptoms including the strength of his stream, frequency and nocturia in case the capacity of the bladder was declining, but I am very glad to say that he has no significant other urine complaints.    \"This dictation was performed with voice recognition software and may contain errors,  omissions and inadvertent word substitution.\"    "

## 2018-06-18 NOTE — LETTER
6/18/2018       RE: Lobo Isaacs  [address]  [facility] [address]     Dear Colleague,    Thank you for referring your patient, Lobo Isaacs, to the [facility] [place] at [facility]. Please see a copy of my visit note below.    This very pleasant 67-year-old gentleman returns today for check cystoscopy.  We recall he had presented with gross hematuria in 2015 and was found to have very extensive involvement of the bladder by superficial bladder cancer.  This was extensively resected at that time and following this procedure we were concerned about the possibility of a bladder leak.  Although subsequently a cystogram showed did not confirm this we did leave the catheter in initially for an extended period  Pathology was low-grade superficial bladder cancer  Follow-up examination of the bladder 3 months later did show additional tumor which was resected, which also was low-grade superficial bladder cancer.  Since then he has had very little evidence of recurrence of disease until 3 months ago when a small patch in the bladder was concerning but was biopsied and showed very superficial low-grade disease in a small area.  He returns today for check cystoscopy.     Procedure.  Cystoscopy.   Surgeon.    Melissa  Anesthesia.  Local anesthesia.  Description.  With the patient in the supine position, with the genital area prepped and draped in the customary fashion, with local anesthetic and the urethra, the flexible cystoscope was Inserted.  The penile urethra is normal.  The external sphincter is intact.  The prostatic urethra shows evidence of an open bladder neck following resection.  The anterior the bladder is carefully inspected.  There are some areas still indicating inflammation from the previous fulguration 3 months ago.  I see no evidence of new tumor.  There is no evidence of stone formation.  There is no major  "trabeculation.  There are no other remarkable features.    Impression.  We will quite clearly need to follow this very closely.  We will send a urine for FISH test today.  We will see him again in 3 months to repeat cystoscopy and fish test.  We did have a careful discussion today about the need for long-term surveillance, in particular because of the extensive disease we encountered 3 years ago.  He is voiding well with minimal frequency and nocturia and no other complaints.  It is possible should further disease be identified we will  need to plan for intravesical treatment of some sort probably BCG.  We did therefore have a discussion about the use of BCG and some of its benefits and potential risks of drawbacks.  At this point we will continue surveillance alone and will see him in 3 months for cystoscopy repeat fish test.  If the fish test is positive on this occasion today and we may need to consider bladder biopsies prior to that time.  I did discuss the entire situation with the patient in detail today.  I answered all his questions.    Plan.  3 months for cystoscopy and fish test.    Time.  I did spend 15  minutes in addition to the procedure to previous records thoroughly, to go over the findings today and talk about the need for close follow-up and potential alternative treatment strategies based on the future findings.  In addition we did have a discussion about other urinary symptoms including the strength of his stream, frequency and nocturia in case the capacity of the bladder was declining, but I am very glad to say that he has no significant other urine complaints.    \"This dictation was performed with voice recognition software and may contain errors,  omissions and inadvertent word substitution.\"      Again, thank you for allowing me to participate in the care of your patient.      Sincerely,    Cody Torres MD      "

## 2018-06-25 LAB
COPATH REPORT: NORMAL
COPATH REPORT: NORMAL

## 2018-09-13 DIAGNOSIS — C67.9 BLADDER CANCER (H): Primary | ICD-10-CM

## 2018-09-17 ENCOUNTER — OFFICE VISIT (OUTPATIENT)
Dept: UROLOGY | Facility: CLINIC | Age: 68
End: 2018-09-17
Payer: COMMERCIAL

## 2018-09-17 VITALS
SYSTOLIC BLOOD PRESSURE: 144 MMHG | DIASTOLIC BLOOD PRESSURE: 86 MMHG | BODY MASS INDEX: 20.46 KG/M2 | WEIGHT: 135 LBS | HEIGHT: 68 IN

## 2018-09-17 DIAGNOSIS — C67.9 MALIGNANT NEOPLASM OF URINARY BLADDER, UNSPECIFIED SITE (H): ICD-10-CM

## 2018-09-17 DIAGNOSIS — N40.0 BENIGN PROSTATIC HYPERPLASIA WITHOUT LOWER URINARY TRACT SYMPTOMS: Primary | ICD-10-CM

## 2018-09-17 LAB
ALBUMIN UR-MCNC: NEGATIVE MG/DL
APPEARANCE UR: CLEAR
BILIRUB UR QL STRIP: NEGATIVE
COLOR UR AUTO: YELLOW
GLUCOSE UR STRIP-MCNC: NEGATIVE MG/DL
HGB UR QL STRIP: ABNORMAL
KETONES UR STRIP-MCNC: NEGATIVE MG/DL
LEUKOCYTE ESTERASE UR QL STRIP: NEGATIVE
NITRATE UR QL: NEGATIVE
PH UR STRIP: 6.5 PH (ref 5–7)
SOURCE: ABNORMAL
SP GR UR STRIP: 1.01 (ref 1–1.03)
UROBILINOGEN UR STRIP-ACNC: 0.2 EU/DL (ref 0.2–1)

## 2018-09-17 PROCEDURE — 88120 CYTP URNE 3-5 PROBES EA SPEC: CPT | Performed by: UROLOGY

## 2018-09-17 PROCEDURE — 52000 CYSTOURETHROSCOPY: CPT | Performed by: UROLOGY

## 2018-09-17 PROCEDURE — 81003 URINALYSIS AUTO W/O SCOPE: CPT | Performed by: UROLOGY

## 2018-09-17 RX ORDER — CIPROFLOXACIN 500 MG/1
500 TABLET, FILM COATED ORAL ONCE
Qty: 1 TABLET | Refills: 0 | Status: SHIPPED | OUTPATIENT
Start: 2018-09-17 | End: 2019-02-19

## 2018-09-17 ASSESSMENT — PAIN SCALES - GENERAL
PAINLEVEL: NO PAIN (0)
PAINLEVEL: NO PAIN (0)

## 2018-09-17 NOTE — MR AVS SNAPSHOT
"              After Visit Summary   9/17/2018    Lobo Isaacs    MRN: [id number]           Patient Information     Date Of Birth          1950        Visit Information        Provider Department      9/17/2018 8:00 AM Cody Torres MD; [facility] [place]        Today's Diagnoses     Bladder cancer (H)        Malignant neoplasm of urinary bladder, unspecified site (H)          Care Instructions         AFTER YOUR CYSTOSCOPY         You have just completed a cystoscopy, or \"cysto\", which allowed your physician to learn more about your bladder (or to remove a stent placed after surgery). We suggest that you continue to avoid caffeine, fruit juice, and alcohol for the next 24 hours, however, you are encouraged to return to your normal activities.       A few things that are considered normal after your cystoscopy:    * small amount of bleeding (or spotting) that clears within the next 24 hours    * slight burning sensation with urination    * sensation to of needing to avoid more frequently    * the feeling of \"air\" in your urine    * mild discomfort that is relieved with Tylonol        Please contact our office promptly if you:    * develop a fever above 101 degrees    * are unable to urinate    * develop bright red blood that does not stop    * severe pain or swelling        And of course, please contact our office with any concerns or questions [phone]              Follow-ups after your visit        Your next 10 appointments already scheduled     Mar 18, 2019  9:00 AM CDT   Cystoscopy with Cody Torres MD, [facility] ([facility])    [address]  [address]  [facility] [id number] [phone]              Who to contact     If you have questions or need follow up information about today's clinic visit or your schedule please contact [facility] " "[place] directly at [phone].  Normal or non-critical lab and imaging results will be communicated to you by MyChart, letter or phone within 4 business days after the clinic has received the results. If you do not hear from us within 7 days, please contact the clinic through [employer] or phone. If you have a critical or abnormal lab result, we will notify you by phone as soon as possible.  Submit refill requests through [employer] or call your pharmacy and they will forward the refill request to us. Please allow 3 business days for your refill to be completed.          Additional Information About Your Visit        [employer] Information     [employer] lets you send messages to your doctor, view your test results, renew your prescriptions, schedule appointments and more. To sign up, go to [email or web address] . Click on \"Log in\" on the left side of the screen, which will take you to the Welcome page. Then click on \"Sign up Now\" on the right side of the page.     You will be asked to enter the access code listed below, as well as some personal information. Please follow the directions to create your username and password.     Your access code is: [email or web address]  Expires: 2018  8:49 AM     Your access code will  in 90 days. If you need help or a new code, please call your [place] clinic or [phone].        Care EveryWhere ID     This is your Care EveryWhere ID. This could be used by other organizations to access your [place] medical records  [id number]        Your Vitals Were     Height BMI (Body Mass Index)                1.727 m (5' 8\") 20.53 kg/m2           Blood Pressure from Last 3 Encounters:   18 144/86   18 138/74   18 122/70    Weight from Last 3 Encounters:   18 61.2 kg (135 lb)   18 61.2 kg (135 lb)   18 62.1 kg (137 lb)              We Performed the Following     FISH BLADDER CANCER     UA without Microscopic          Today's Medication Changes          These " changes are accurate as of 9/17/18  8:49 AM.  If you have any questions, ask your nurse or doctor.               Start taking these medicines.        Dose/Directions    ciprofloxacin 500 MG tablet   Commonly known as:  CIPRO   Used for:  Bladder cancer (H), Malignant neoplasm of urinary bladder, unspecified site (H)   Started by:  Cody Torres MD        Dose:  500 mg   Take 1 tablet (500 mg) by mouth once for 1 dose   Quantity:  1 tablet   Refills:  0            Where to get your medicines      [facility] [address] [place] [address] [address] [facility]  [address] [facility] [address]     Phone:  [phone]     ciprofloxacin 500 MG tablet                Primary Care Provider Office Phone # Fax #    Efra Jasmina Crockett [phone] [address] [facility] [address]        Equal Access to Services     [facility] : Hadii jan rodriguez hadasho Soomaali, waaxda luqadaha, qaybta kaalmada adeegyada, purnima stubbs . So [facility] [phone].    ATENCIÓN: Si habla español, tiene a diaz disposición servicios gratuitos de asistencia lingüística. Llame al [phone].    We comply with applicable federal civil rights laws and [place] laws. We do not discriminate on the basis of race, color, national origin, age, disability, sex, sexual orientation, or gender identity.            Thank you!     Thank you for choosing [facility] [place]  for your care. Our goal is always to provide you with excellent care. Hearing back from our patients is one way we can continue to improve our services. Please take a few minutes to complete the written survey that you may receive in the mail after your visit with us. Thank you!             Your Updated Medication List - Protect others around you: Learn how to safely use, store and throw away your medicines at www.disposemymeds.org.          This list  is accurate as of 9/17/18  8:49 AM.  Always use your most recent med list.                   Brand Name Dispense Instructions for use Diagnosis    * atorvastatin 10 MG tablet    LIPITOR    30 tablet    Take 1 tablet (10 mg) by mouth daily    Pure hypercholesterolemia       * atorvastatin 10 MG tablet    LIPITOR    90 tablet    Take 1 tablet (10 mg) by mouth daily    Pure hypercholesterolemia       ciprofloxacin 500 MG tablet    CIPRO    1 tablet    Take 1 tablet (500 mg) by mouth once for 1 dose    Bladder cancer (H), Malignant neoplasm of urinary bladder, unspecified site (H)       metoprolol succinate 50 MG 24 hr tablet    TOPROL-XL    90 tablet    Take 1 tablet (50 mg) by mouth daily    Hypertension goal BP (blood pressure) < 140/80       tamsulosin 0.4 MG capsule    FLOMAX    30 capsule    Take 1 capsule (0.4 mg) by mouth daily    Urinary retention with incomplete bladder emptying       TYLENOL PO      Take 500 mg by mouth every 4 hours as needed for mild pain or fever        * Notice:  This list has 2 medication(s) that are the same as other medications prescribed for you. Read the directions carefully, and ask your doctor or other care provider to review them with you.

## 2018-09-17 NOTE — PROGRESS NOTES
It is a great pleasure to see this very pleasant 67-year-old gentleman in follow-up consultation today.  He returns today for check cystoscopy.  We recall that he had presented in 2015 with gross hematuria with extensive involvement of the bladder by superficial bladder cancer.  This was extensively resected at that time, we initially were concerned about the bladder leak, we left the catheter in for an extended period, subsequent cystogram showed no evidence of leak.  Pathology was low-grade superficial bladder cancer.  3 months later follow-up cystoscopy did show additional tumor which was resected which is also low-grade and superficial.  Since then has been very little evidence recurrence, except 6 months ago small patch in the bladder was concerning, was biopsied and showed very superficial low-grade disease in a small area.  3 months ago, there is still some inflammation in the area of the fulguration from the previous biopsy but no other remarkable features.  .  There is been no evidence of gross hematuria and his general health is otherwise stable.  Results for NII MARQUIS (MRN [id number]) as of 9/17/2018 08:20   Ref. Range 3/16/2018 06:20   Creatinine Latest Ref Range: 0.66 - 1.25 mg/dL 0.80   Previous fish test did not show evidence of malignancy.       Procedure.  Cystoscopy.   Surgeon.    Melissa  Anesthesia.  Local anesthesia.  Description.  With the patient in the supine position, with the genital area prepped and draped in the customary fashion, with local anesthetic and the urethra, the flexible cystoscope was Inserted.  The penile urethra is normal.  The external sphincter is intact.  Review of the verumontanum there is mild enlargement of the lateral lobes of the prostate but no other remarkable features.  The interior of the bladder is carefully inspected.  There is grade 1 trabeculation.  There is no evidence of calculus in the bladder.  There are 2 very small patches of inflammation on the  "anterior surface of the bladder which appears stable.  There is no evidence of obvious tumor in the bladder.    Impression.  We will continue to monitor this closely.  I discussed the findings with him carefully today.  We have done a fish test today and if the fish test is positive we will need to return to biopsy and fulgurate these areas and probably do retrograde pyelograms as well.  I did explain to him how important it was for close follow-up.  At this stage however given the stability of the urothelium I would plan to repeat the next cystoscopy in 6 months unless the fish test is positive.  We did have a careful discussion also about other issues including reporting to me immediately should he observe gross hematuria, change in his voiding symptoms or indeed any other significant urologic factors.  I also explained to him about the prostate which was slightly enlarged although this time does not seem to be causing significant urologic problems.    Plan.  6 months for cystoscopy and fish test.    Time.  15 minutes was spent in addition to the procedure itself in order to discuss the findings, to talk about the need for close follow-up, to explain about the possibility of alternative therapeutic options that may need to be considered in the future and also discuss issues related to the prostate which was quite routine    \"This dictation was performed with voice recognition software and may contain errors,  omissions and inadvertent word substitution.\"    "

## 2018-09-17 NOTE — NURSING NOTE
Invasive Procedure Safety Checklist:    Procedure:     Action: Complete sections and checkboxes as appropriate.    Pre-procedure:  1. Patient ID Verified with 2 identifiers (Roxy and  or MRN) : YES    2. Procedure and site verified with patient/designee (when able) : YES    3. Accurate consent documentation in medical record : YES    4. H&P (or appropriate assessment) documented in medical record : YES  H&P must be up to 30 days prior to procedure an updated within 24 hours of                 Procedure as applicable.     5. Relevant diagnostic and radiology test results appropriately labeled and displayed as applicable : YES    6. Blood products, implants, devices, and/or special equipment available for the procedure as applicable : YES    7. Procedure site(s) marked with provider initials [Exclusions: N/A] : NO    8. Marking not required. Reason : Yes  Procedure does not require site marking    Time Out:     Time-Out performed immediately prior to starting procedure, including verbal and active participation of all team members addressing: YES    1. Correct patient identity.  2. Confirmed that the correct side and site are marked.  3. An accurate procedure to be done.  4. Agreement on the procedure to be done.  5. Correct patient position.  6. Relevant images and results are properly labeled and appropriately displayed.  7. The need to administer antibiotics or fluids for irrigation purposes during the procedure as applicable.  8. Safety precautions based on patient history or medication use.    During Procedure: Verification of correct person, site, and procedure occurs any time the responsibility for care of the patient is transferred to another member of the care team.    The following medication was given:     MEDICATION: Uro-Jet  ROUTE: Urethral   SITE: Urethra via the meatus    DOSE: 5 mL  LOT #: [id number]  :  [employer]   EXPIRATION DATE:  C#:  [id number]

## 2018-09-17 NOTE — PATIENT INSTRUCTIONS
"     AFTER YOUR CYSTOSCOPY         You have just completed a cystoscopy, or \"cysto\", which allowed your physician to learn more about your bladder (or to remove a stent placed after surgery). We suggest that you continue to avoid caffeine, fruit juice, and alcohol for the next 24 hours, however, you are encouraged to return to your normal activities.       A few things that are considered normal after your cystoscopy:    * small amount of bleeding (or spotting) that clears within the next 24 hours    * slight burning sensation with urination    * sensation to of needing to avoid more frequently    * the feeling of \"air\" in your urine    * mild discomfort that is relieved with Tylonol        Please contact our office promptly if you:    * develop a fever above 101 degrees    * are unable to urinate    * develop bright red blood that does not stop    * severe pain or swelling        And of course, please contact our office with any concerns or questions [phone]      "

## 2018-09-17 NOTE — LETTER
9/17/2018       RE: Nii Isaacs  [address]  [facility] [address]     Dear Colleague,    Thank you for referring your patient, Nii Isaacs, to the [facility] [place] at [facility]. Please see a copy of my visit note below.    It is a great pleasure to see this very pleasant 67-year-old gentleman in follow-up consultation today.  He returns today for check cystoscopy.  We recall that he had presented in 2015 with gross hematuria with extensive involvement of the bladder by superficial bladder cancer.  This was extensively resected at that time, we initially were concerned about the bladder leak, we left the catheter in for an extended period, subsequent cystogram showed no evidence of leak.  Pathology was low-grade superficial bladder cancer.  3 months later follow-up cystoscopy did show additional tumor which was resected which is also low-grade and superficial.  Since then has been very little evidence recurrence, except 6 months ago small patch in the bladder was concerning, was biopsied and showed very superficial low-grade disease in a small area.  3 months ago, there is still some inflammation in the area of the fulguration from the previous biopsy but no other remarkable features.  .  There is been no evidence of gross hematuria and his general health is otherwise stable.  Results for NII ISAACS (MRN [id number]) as of 9/17/2018 08:20   Ref. Range 3/16/2018 06:20   Creatinine Latest Ref Range: 0.66 - 1.25 mg/dL 0.80   Previous fish test did not show evidence of malignancy.       Procedure.  Cystoscopy.   Surgeon.    Melissa  Anesthesia.  Local anesthesia.  Description.  With the patient in the supine position, with the genital area prepped and draped in the customary fashion, with local anesthetic and the urethra, the flexible cystoscope was Inserted.  The penile urethra is normal.  The external sphincter is  "intact.  Review of the verumontanum there is mild enlargement of the lateral lobes of the prostate but no other remarkable features.  The interior of the bladder is carefully inspected.  There is grade 1 trabeculation.  There is no evidence of calculus in the bladder.  There are 2 very small patches of inflammation on the anterior surface of the bladder which appears stable.  There is no evidence of obvious tumor in the bladder.    Impression.  We will continue to monitor this closely.  I discussed the findings with him carefully today.  We have done a fish test today and if the fish test is positive we will need to return to biopsy and fulgurate these areas and probably do retrograde pyelograms as well.  I did explain to him how important it was for close follow-up.  At this stage however given the stability of the urothelium I would plan to repeat the next cystoscopy in 6 months unless the fish test is positive.  We did have a careful discussion also about other issues including reporting to me immediately should he observe gross hematuria, change in his voiding symptoms or indeed any other significant urologic factors.  I also explained to him about the prostate which was slightly enlarged although this time does not seem to be causing significant urologic problems.    Plan.  6 months for cystoscopy and fish test.    Time.  15 minutes was spent in addition to the procedure itself in order to discuss the findings, to talk about the need for close follow-up, to explain about the possibility of alternative therapeutic options that may need to be considered in the future and also discuss issues related to the prostate which was quite routine    \"This dictation was performed with voice recognition software and may contain errors,  omissions and inadvertent word substitution.\"      Cody Torres MD      "

## 2018-09-27 LAB — COPATH REPORT: NORMAL

## 2018-11-09 ENCOUNTER — ALLIED HEALTH/NURSE VISIT (OUTPATIENT)
Dept: NURSING | Facility: CLINIC | Age: 68
End: 2018-11-09
Payer: COMMERCIAL

## 2018-11-09 DIAGNOSIS — Z23 NEED FOR PROPHYLACTIC VACCINATION AND INOCULATION AGAINST INFLUENZA: Primary | ICD-10-CM

## 2018-11-09 PROCEDURE — 90662 IIV NO PRSV INCREASED AG IM: CPT

## 2018-11-09 PROCEDURE — G0008 ADMIN INFLUENZA VIRUS VAC: HCPCS

## 2018-11-09 PROCEDURE — 99207 ZZC NO CHARGE NURSE ONLY: CPT

## 2018-11-09 NOTE — MR AVS SNAPSHOT
"              After Visit Summary   11/9/2018    Lobo Isaacs    MRN: [id number]           Patient Information     Date Of Birth          1950        Visit Information        Provider Department      11/9/2018 3:00 PM BM NURSE [facility]        Today's Diagnoses     Need for prophylactic vaccination and inoculation against influenza    -  1       Follow-ups after your visit        Your next 10 appointments already scheduled     Mar 18, 2019  9:00 AM CDT   Cystoscopy with Cody Torres MD, UA CYF   [facility] [place] ([facility])    [address]  [address]  [facility] [address] [phone]              Who to contact     If you have questions or need follow up information about today's clinic visit or your schedule please contact [facility] directly at [phone].  Normal or non-critical lab and imaging results will be communicated to you by MyChart, letter or phone within 4 business days after the clinic has received the results. If you do not hear from us within 7 days, please contact the clinic through MyChart or phone. If you have a critical or abnormal lab result, we will notify you by phone as soon as possible.  Submit refill requests through [employer] or call your pharmacy and they will forward the refill request to us. Please allow 3 business days for your refill to be completed.          Additional Information About Your Visit        MyChart Information     [employer] lets you send messages to your doctor, view your test results, renew your prescriptions, schedule appointments and more. To sign up, go to [email or web address] . Click on \"Log in\" on the left side of the screen, which will take you to the Welcome page. Then click on \"Sign up Now\" on the right side of the page.     You will be asked to enter the access code listed below, as well as some personal " information. Please follow the directions to create your username and password.     Your access code is: [email or web address]  Expires: 2018  7:49 AM     Your access code will  in 90 days. If you need help or a new code, please call your [facility] clinic or [phone].        Care EveryWhere ID     This is your Care EveryWhere ID. This could be used by other organizations to access your [facility] medical records  [id number]         Blood Pressure from Last 3 Encounters:   18 144/86   18 138/74   18 122/70    Weight from Last 3 Encounters:   18 135 lb (61.2 kg)   18 135 lb (61.2 kg)   18 137 lb (62.1 kg)              We Performed the Following     ADMIN INFLUENZA (For MEDICARE Patients ONLY) []     FLU VACCINE, INCREASED ANTIGEN, PRESV FREE, AGE 65+ [70593]        Primary Care Provider Office Phone # Fax #    Efra Jasmina Crockett [phone] [address] [facility] [address]        Equal Access to Services     EUGENE BARRON : Hadii aad ku hadasho Soomaali, waaxda luqadaha, qaybta kaalmada adeegyada, waxay hany desirn stu melton. So [facility] [phone].    ATENCIÓN: Si habla español, tiene a diaz disposición servicios gratuitos de asistencia lingüística. Llame al [phone].    We comply with applicable federal civil rights laws and [place] laws. We do not discriminate on the basis of race, color, national origin, age, disability, sex, sexual orientation, or gender identity.            Thank you!     Thank you for choosing [facility]  for your care. Our goal is always to provide you with excellent care. Hearing back from our patients is one way we can continue to improve our services. Please take a few minutes to complete the written survey that you may receive in the mail after your visit with us. Thank you!             Your Updated Medication List - Protect others around you: Learn how to  safely use, store and throw away your medicines at www.disposemymeds.org.          This list is accurate as of 11/9/18  3:10 PM.  Always use your most recent med list.                   Brand Name Dispense Instructions for use Diagnosis    * atorvastatin 10 MG tablet    LIPITOR    30 tablet    Take 1 tablet (10 mg) by mouth daily    Pure hypercholesterolemia       * atorvastatin 10 MG tablet    LIPITOR    90 tablet    Take 1 tablet (10 mg) by mouth daily    Pure hypercholesterolemia       metoprolol succinate 50 MG 24 hr tablet    TOPROL-XL    90 tablet    Take 1 tablet (50 mg) by mouth daily    Hypertension goal BP (blood pressure) < 140/80       tamsulosin 0.4 MG capsule    FLOMAX    30 capsule    Take 1 capsule (0.4 mg) by mouth daily    Urinary retention with incomplete bladder emptying       TYLENOL PO      Take 500 mg by mouth every 4 hours as needed for mild pain or fever        * Notice:  This list has 2 medication(s) that are the same as other medications prescribed for you. Read the directions carefully, and ask your doctor or other care provider to review them with you.

## 2018-11-09 NOTE — PROGRESS NOTES

## 2019-02-12 DIAGNOSIS — I10 HYPERTENSION GOAL BP (BLOOD PRESSURE) < 140/80: ICD-10-CM

## 2019-02-12 NOTE — TELEPHONE ENCOUNTER
Reason for Call:  Medication or medication refill:    Do you use a [facility]?  Name of the pharmacy and phone number for the current request:  [facility] in [place]    Name of the medication requested: metoprolol succinate (TOPROL-XL) 50 MG 24 hr tablet  Other request:    Patient has a Wellness exam scheduled with Dr Rodolfo Crockett next week 02/19/19   He will run out of this medication before this    Can we leave a detailed message on this number? YES    Phone number patient can be reached at: Home number on file [phone] (home)    Best Time:   anytime    Call taken on 2/12/2019 at 10:19 AM by DEANNA RODRIGUEZ

## 2019-02-12 NOTE — TELEPHONE ENCOUNTER
"Requested Prescriptions   Pending Prescriptions Disp Refills     metoprolol succinate ER (TOPROL-XL) 50 MG 24 hr tablet  Last Written Prescription Date:  1/12/2018  Last Fill Quantity: 90 tablet,  # refills: 3   Last office visit: 3/20/2018 with prescribing provider:  FANI Crockett   Future Office Visit:   Next 5 appointments (look out 90 days)    Feb 19, 2019  8:00 AM CST  PHYSICAL with Efra Crockett MD  [facility] [address] [address] [phone]          90 tablet 3     Sig: Take 1 tablet (50 mg) by mouth daily    Beta-Blockers Protocol Failed - 2/12/2019 10:21 AM       Failed - Blood pressure under 140/90 in past 12 months    BP Readings from Last 3 Encounters:   09/17/18 144/86   06/18/18 138/74   03/26/18 122/70                Passed - Patient is age 6 or older       Passed - Recent (12 mo) or future (30 days) visit within the authorizing provider's specialty    Patient had office visit in the last 12 months or has a visit in the next 30 days with authorizing provider or within the authorizing provider's specialty.  See \"Patient Info\" tab in inbasket, or \"Choose Columns\" in Meds & Orders section of the refill encounter.             Passed - Medication is active on med list           "

## 2019-02-15 RX ORDER — METOPROLOL SUCCINATE 50 MG/1
50 TABLET, EXTENDED RELEASE ORAL DAILY
Qty: 30 TABLET | Refills: 0 | Status: SHIPPED | OUTPATIENT
Start: 2019-02-15 | End: 2019-03-16

## 2019-02-19 ENCOUNTER — OFFICE VISIT (OUTPATIENT)
Dept: FAMILY MEDICINE | Facility: CLINIC | Age: 69
End: 2019-02-19
Payer: COMMERCIAL

## 2019-02-19 VITALS
BODY MASS INDEX: 20.45 KG/M2 | HEART RATE: 60 BPM | TEMPERATURE: 96.4 F | WEIGHT: 134.5 LBS | OXYGEN SATURATION: 100 % | DIASTOLIC BLOOD PRESSURE: 74 MMHG | SYSTOLIC BLOOD PRESSURE: 132 MMHG

## 2019-02-19 DIAGNOSIS — C67.9: ICD-10-CM

## 2019-02-19 DIAGNOSIS — Z12.5 SCREENING PSA (PROSTATE SPECIFIC ANTIGEN): ICD-10-CM

## 2019-02-19 DIAGNOSIS — R31.29 MICROSCOPIC HEMATURIA: ICD-10-CM

## 2019-02-19 DIAGNOSIS — Z12.11 SPECIAL SCREENING FOR MALIGNANT NEOPLASMS, COLON: ICD-10-CM

## 2019-02-19 DIAGNOSIS — Z00.00 MEDICARE ANNUAL WELLNESS VISIT, SUBSEQUENT: Primary | ICD-10-CM

## 2019-02-19 DIAGNOSIS — H25.013 CORTICAL AGE-RELATED CATARACT OF BOTH EYES: ICD-10-CM

## 2019-02-19 DIAGNOSIS — E78.5 HYPERLIPIDEMIA WITH TARGET LDL LESS THAN 130: ICD-10-CM

## 2019-02-19 DIAGNOSIS — R63.6 UNDERWEIGHT: ICD-10-CM

## 2019-02-19 LAB
ALBUMIN UR-MCNC: NEGATIVE MG/DL
ALT SERPL W P-5'-P-CCNC: 20 U/L (ref 0–70)
ANION GAP SERPL CALCULATED.3IONS-SCNC: 2 MMOL/L (ref 3–14)
APPEARANCE UR: CLEAR
BASOPHILS # BLD AUTO: 0 10E9/L (ref 0–0.2)
BASOPHILS NFR BLD AUTO: 0.2 %
BILIRUB UR QL STRIP: NEGATIVE
BUN SERPL-MCNC: 19 MG/DL (ref 7–30)
CALCIUM SERPL-MCNC: 9.2 MG/DL (ref 8.5–10.1)
CHLORIDE SERPL-SCNC: 101 MMOL/L (ref 94–109)
CHOLEST SERPL-MCNC: 184 MG/DL
CO2 SERPL-SCNC: 31 MMOL/L (ref 20–32)
COLOR UR AUTO: YELLOW
CREAT SERPL-MCNC: 0.9 MG/DL (ref 0.66–1.25)
DIFFERENTIAL METHOD BLD: NORMAL
EOSINOPHIL # BLD AUTO: 0.2 10E9/L (ref 0–0.7)
EOSINOPHIL NFR BLD AUTO: 3 %
ERYTHROCYTE [DISTWIDTH] IN BLOOD BY AUTOMATED COUNT: 13.4 % (ref 10–15)
GFR SERPL CREATININE-BSD FRML MDRD: 86 ML/MIN/{1.73_M2}
GLUCOSE SERPL-MCNC: 102 MG/DL (ref 70–99)
GLUCOSE UR STRIP-MCNC: NEGATIVE MG/DL
HCT VFR BLD AUTO: 41.4 % (ref 40–53)
HDLC SERPL-MCNC: 73 MG/DL
HGB BLD-MCNC: 13.5 G/DL (ref 13.3–17.7)
HGB UR QL STRIP: ABNORMAL
KETONES UR STRIP-MCNC: NEGATIVE MG/DL
LDLC SERPL CALC-MCNC: 101 MG/DL
LEUKOCYTE ESTERASE UR QL STRIP: NEGATIVE
LYMPHOCYTES # BLD AUTO: 1.5 10E9/L (ref 0.8–5.3)
LYMPHOCYTES NFR BLD AUTO: 26.4 %
MCH RBC QN AUTO: 29.7 PG (ref 26.5–33)
MCHC RBC AUTO-ENTMCNC: 32.6 G/DL (ref 31.5–36.5)
MCV RBC AUTO: 91 FL (ref 78–100)
MONOCYTES # BLD AUTO: 0.6 10E9/L (ref 0–1.3)
MONOCYTES NFR BLD AUTO: 9.6 %
NEUTROPHILS # BLD AUTO: 3.5 10E9/L (ref 1.6–8.3)
NEUTROPHILS NFR BLD AUTO: 60.8 %
NITRATE UR QL: NEGATIVE
NONHDLC SERPL-MCNC: 111 MG/DL
PH UR STRIP: 7 PH (ref 5–7)
PLATELET # BLD AUTO: 210 10E9/L (ref 150–450)
POTASSIUM SERPL-SCNC: 4.5 MMOL/L (ref 3.4–5.3)
PSA SERPL-ACNC: 0.43 UG/L (ref 0–4)
RBC # BLD AUTO: 4.55 10E12/L (ref 4.4–5.9)
RBC #/AREA URNS AUTO: NORMAL /HPF
SODIUM SERPL-SCNC: 134 MMOL/L (ref 133–144)
SOURCE: ABNORMAL
SP GR UR STRIP: 1.01 (ref 1–1.03)
TRIGL SERPL-MCNC: 50 MG/DL
UROBILINOGEN UR STRIP-ACNC: 0.2 EU/DL (ref 0.2–1)
WBC # BLD AUTO: 5.7 10E9/L (ref 4–11)
WBC #/AREA URNS AUTO: NORMAL /HPF

## 2019-02-19 PROCEDURE — 36415 COLL VENOUS BLD VENIPUNCTURE: CPT | Performed by: FAMILY MEDICINE

## 2019-02-19 PROCEDURE — 80048 BASIC METABOLIC PNL TOTAL CA: CPT | Performed by: FAMILY MEDICINE

## 2019-02-19 PROCEDURE — G0103 PSA SCREENING: HCPCS | Performed by: FAMILY MEDICINE

## 2019-02-19 PROCEDURE — 85025 COMPLETE CBC W/AUTO DIFF WBC: CPT | Performed by: FAMILY MEDICINE

## 2019-02-19 PROCEDURE — G0439 PPPS, SUBSEQ VISIT: HCPCS | Performed by: FAMILY MEDICINE

## 2019-02-19 PROCEDURE — 81001 URINALYSIS AUTO W/SCOPE: CPT | Performed by: FAMILY MEDICINE

## 2019-02-19 PROCEDURE — 84460 ALANINE AMINO (ALT) (SGPT): CPT | Performed by: FAMILY MEDICINE

## 2019-02-19 PROCEDURE — 80061 LIPID PANEL: CPT | Performed by: FAMILY MEDICINE

## 2019-02-19 ASSESSMENT — ACTIVITIES OF DAILY LIVING (ADL): CURRENT_FUNCTION: NO ASSISTANCE NEEDED

## 2019-02-19 NOTE — PROGRESS NOTES
"  SUBJECTIVE:     Lobo Isaacs is a 68 year old male who presents for Preventive Visit.            Are you in the first 12 months of your Medicare coverage?  No        Annual Wellness Visit       In general, how would you rate your overall health?  Good      Frequency of exercise:  2-3 days/week    Do you usually eat at least 4 servings of fruit and vegetables a day, include whole grains    & fiber and avoid regularly eating high fat or \"junk\" foods?  No      Taking medications regularly:  Yes      Medication side effects:  None      Ability to successfully perform activities of daily living:  No assistance needed      Home Safety:  No safety concerns identified      Hearing Impairment:  Feel that people are mumbling or not speaking clearly    In the past 6 months, have you been bothered by leaking of urine?  No      In general, how would you rate your overall mental or emotional health?  Good      PHQ-2 Total Score: 0      Additional concerns today:  No        Do you feel safe in your environment? Yes        Do you have a Health Care Directive? Yes: Advance Directive has been received and scanned.            Fall risk             Cognitive Screening     1) Repeat 3 items (Leader, Season, Table)      2) Clock draw: NORMAL    3) 3 item recall: Recalls 3 objects    Results: 3 items recalled: COGNITIVE IMPAIRMENT LESS LIKELY        Mini-CogTM Copyright S Celia. Licensed by the author for use in [facility]; reprinted with permission (soaneta@.[email or web address]). All rights reserved.          Do you have sleep apnea, excessive snoring or daytime drowsiness?: no        Reviewed and updated as needed this visit by clinical staff             has Microscopic hematuria; Hyperlipidemia with target LDL less than 130; Primary bladder papillary carcinoma (H); Hypertension goal BP (blood pressure) < 140/80; Bladder cancer (H); Bladder tumor; Abscess, bladder; Health Care Home; ACP (advance care planning); Rotator cuff syndrome " of left shoulder; Right foot pain; Primary osteoarthritis of right knee; Underweight; Bilateral sensorineural hearing loss; Senile cataract of left eye; Hematuria; and Urinary retention on their problem list.    Personal history of bladder cancer with papillary carcinoma which has been treated    History of bladder abscess    History of urinary retention    Problem #2 hypertension well controlled at this time    Problem #3 hyperlipidemia with an LDL goal of less than 1    Problem #4 bilateral neurosensory hearing loss    Problem #5 cataract left eye    Problem #6 history of right foot pain    Ongoing aches and pains    History of running     Problem #7 osteoarthritis involving the right knee    Improved     Problem #8 thigh numbness after ruptured blood vessel     Asymptomatic at present except numbness    Problem # 9 new skin lesion     Raised left lower lid lesion     [facility]     Reviewed and updated as needed this visit by Provider                  Social History         Tobacco Use         Smoking status:   Former Smoker         Smokeless tobacco:   Never Used         Tobacco comment: SMOKED BRIEFLY IN HIS 20'S     Substance Use Topics         Alcohol use:   No             Alcohol Use   2/19/2019     If you drink alcohol do you typically have greater than 3 drinks per day OR greater than 7 drinks per week?   Not Applicable     No flowsheet data found.                Current providers sharing in care for this patient include:     Patient Care Team:    Efra Crockett MD as PCP - General (Family Practice)    Efra Crockett MD as PCP - Assigned PCP    Cody Torres MD as MD (Urology)        The following health maintenance items are reviewed in Epic and correct as of today:    Health Maintenance     Topic   Date Due         HEPATITIS C SCREENING    10/12/1966         ZOSTER IMMUNIZATION (1 of 2)   10/12/1998         FALL RISK ASSESSMENT    10/06/2017          COLONOSCOPY Q5 YR    01/29/2018         PHQ-2 Q1 YR    03/06/2019         ADVANCE DIRECTIVE PLANNING Q5 YRS    07/23/2020         LIPID SCREEN Q5 YR MALE (SYSTEM ASSIGNED)    12/07/2022         DTAP/TDAP/TD IMMUNIZATION (2 - Td)   12/27/2022         INFLUENZA VACCINE    Completed         AORTIC ANEURYSM SCREENING (SYSTEM ASSIGNED)    Completed         IPV IMMUNIZATION    Aged Out         MENINGITIS IMMUNIZATION    Aged Out         Labs reviewed in EPIC  BP Readings from Last 3 Encounters:   02/19/19 132/74   09/17/18 144/86   06/18/18 138/74    Wt Readings from Last 3 Encounters:   02/19/19 61 kg (134 lb 8 oz)   09/17/18 61.2 kg (135 lb)   06/18/18 61.2 kg (135 lb)                  Patient Active Problem List   Diagnosis     Microscopic hematuria     Hyperlipidemia with target LDL less than 130     Primary bladder papillary carcinoma (H)     Hypertension goal BP (blood pressure) < 140/80     Bladder cancer (H)     Bladder tumor     Abscess, bladder     Health Care Home     ACP (advance care planning)     Rotator cuff syndrome of left shoulder     Right foot pain     Primary osteoarthritis of right knee     Underweight     Bilateral sensorineural hearing loss     Senile cataract of left eye     Hematuria     Urinary retention     Past Surgical History:   Procedure Laterality Date     COLONOSCOPY       CYSTOSCOPY       CYSTOSCOPY, BIOPSY BLADDER, COMBINED N/A 3/8/2018    Procedure: COMBINED CYSTOSCOPY, BIOPSY BLADDER;;  Surgeon: Cody Torres MD;  Location:  OR     CYSTOSCOPY, CYSTOGRAM, COMBINED N/A 3/2/2015    Procedure: COMBINED CYSTOSCOPY, CYSTOGRAM;  Surgeon: Cody Torres MD;  Location:  OR     CYSTOSCOPY, FULGURATE BLEEDERS, EVACUATE CLOT(S), COMBINED N/A 3/2/2015    Procedure: COMBINED CYSTOSCOPY, FULGURATE BLEEDERS, EVACUATE CLOT(S);  Surgeon: Cody Torres MD;  Location:  OR     CYSTOSCOPY, FULGURATE BLEEDERS, EVACUATE CLOT(S), COMBINED N/A 3/15/2018    Procedure: COMBINED  CYSTOSCOPY, FULGURATE BLEEDERS, EVACUATE CLOT(S);  CYSTOSCOPY, FULGURATION OF BLEEDERS.;  Surgeon: Cody Torres MD;  Location:  OR     CYSTOSCOPY, RETROGRADES, COMBINED  3/2/2015    Procedure: COMBINED CYSTOSCOPY, RETROGRADES;  Surgeon: Cody Torres MD;  Location:  OR     CYSTOSCOPY, RETROGRADES, COMBINED  7/7/2015    Procedure: COMBINED CYSTOSCOPY, RETROGRADES;  Surgeon: Cody Torres MD;  Location:  OR     CYSTOSCOPY, RETROGRADES, COMBINED Bilateral 3/8/2018    Procedure: COMBINED CYSTOSCOPY, RETROGRADES;  CYSTOSCOPY, BILATERAL RETROGRADES, TRANSURETHRAL RESECTION OF BLADDER TUMOR, BLADDER BIOPSY;  Surgeon: Cody Torres MD;  Location:  OR     CYSTOSCOPY, TRANSURETHRAL RESECTION (TUR) TUMOR BLADDER, COMBINED N/A 3/2/2015    Procedure: COMBINED CYSTOSCOPY, TRANSURETHRAL RESECTION (TUR) TUMOR BLADDER;  Surgeon: Cody Torers MD;  Location:  OR     CYSTOSCOPY, TRANSURETHRAL RESECTION (TUR) TUMOR BLADDER, COMBINED N/A 7/7/2015    Procedure: COMBINED CYSTOSCOPY, TRANSURETHRAL RESECTION (TUR) TUMOR BLADDER;  Surgeon: Cody Torres MD;  Location:  OR     CYSTOSCOPY, TRANSURETHRAL RESECTION (TUR) TUMOR BLADDER, COMBINED N/A 3/8/2018    Procedure: COMBINED CYSTOSCOPY, TRANSURETHRAL RESECTION (TUR) TUMOR BLADDER;;  Surgeon: Cody Torres MD;  Location:  OR     HERNIORRHAPHY INGUINAL Right 9/4/2015    Procedure: HERNIORRHAPHY INGUINAL;  Surgeon: Levi Warren MD;  Location: [facility]     LAPAROSCOPIC HERNIORRHAPHY INGUINAL Right 9/4/2015    Procedure: LAPAROSCOPIC HERNIORRHAPHY INGUINAL;  Surgeon: Levi Warren MD;  Location: [facility]       Social History     Tobacco Use     Smoking status: Former Smoker     Smokeless tobacco: Never Used     Tobacco comment: SMOKED BRIEFLY IN HIS 20'S   Substance Use Topics     Alcohol use: No     Family History   Problem Relation Age of Onset     Heart Disease Mother      Cancer Father 87        Lung  "cancer,  age 88     Cancer - colorectal Sister 30         Current Outpatient Medications   Medication Sig Dispense Refill     atorvastatin (LIPITOR) 10 MG tablet Take 1 tablet (10 mg) by mouth daily 90 tablet 1     metoprolol succinate ER (TOPROL-XL) 50 MG 24 hr tablet Take 1 tablet (50 mg) by mouth daily 30 tablet 0     Acetaminophen (TYLENOL PO) Take 500 mg by mouth every 4 hours as needed for mild pain or fever       atorvastatin (LIPITOR) 10 MG tablet Take 1 tablet (10 mg) by mouth daily (Patient not taking: Reported on 2018) 30 tablet 11     tamsulosin (FLOMAX) 0.4 MG capsule Take 1 capsule (0.4 mg) by mouth daily (Patient not taking: Reported on 2018) 30 capsule 0     No Known Allergies  Recent Labs   Lab Test 18  0620 03/15/18  0645  17  0935 10/06/16  0916 11/03/15  0930  05/28/15  0946 03/12/15  0606   LDL  --   --   --  178*  --  144*  --  107  --    HDL  --   --   --  78  --  70  --  67  --    TRIG  --   --   --  70  --  52  --  55  --    ALT  --   --   --   --  21  --   --  19 18   CR 0.80 0.85   < > 0.85 0.93  --    < > 0.90 0.73   GFRESTIMATED >90 90   < > 90 81  --    < > 84 >90  Non  GFR Calc     GFRESTBLACK >90 >90   < > >90 >90  --    < > >90 >90  African American GFR Calc     POTASSIUM 4.3 4.0   < > 4.7 4.7  --    < > 4.6 3.9    < > = values in this interval not displayed.                Review of Systems    Constitutional, HEENT, cardiovascular, pulmonary, GI, , musculoskeletal, neuro, skin, endocrine and psych systems are negative, except as otherwise noted.        OBJECTIVE:     There were no vitals taken for this visit. Estimated body mass index is 20.53 kg/m  as calculated from the following:      Height as of 18: 1.727 m (5' 8\").      Weight as of 18: 61.2 kg (135 lb).    Physical Exam    GENERAL: healthy, alert and no distress    EYES: Eyes grossly normal to inspection, PERRL and conjunctivae and sclerae normal    HENT: ear canals and " TM's normal, nose and mouth without ulcers or lesions    NECK: no adenopathy, no asymmetry, masses, or scars and thyroid normal to palpation    RESP: lungs clear to auscultation - no rales, rhonchi or wheezes    CV: regular rate and rhythm, normal S1 S2, no S3 or S4, no murmur, click or rub, no peripheral edema and peripheral pulses strong    ABDOMEN: soft, nontender, no hepatosplenomegaly, no masses and bowel sounds normal     (male): normal male genitalia without lesions or urethral discharge, no hernia    RECTAL: normal sphincter tone, no rectal masses, prostate normal size, smooth, nontender without nodules or masses    MS: no gross musculoskeletal defects noted, no edema    SKIN: no suspicious lesions or rashes    NEURO: Normal strength and tone, mentation intact and speech normal    PSYCH: mentation appears normal, affect normal/bright      Diagnostic Test Results:  Results for orders placed or performed in visit on 09/17/18   UA without Microscopic   Result Value Ref Range    Color Urine Yellow     Appearance Urine Clear     Glucose Urine Negative NEG^Negative mg/dL    Bilirubin Urine Negative NEG^Negative    Ketones Urine Negative NEG^Negative mg/dL    Specific Gravity Urine 1.015 1.003 - 1.035    Blood Urine Moderate (A) NEG^Negative    pH Urine 6.5 5.0 - 7.0 pH    Protein Albumin Urine Negative NEG^Negative mg/dL    Urobilinogen Urine 0.2 0.2 - 1.0 EU/dL    Nitrite Urine Negative NEG^Negative    Leukocyte Esterase Urine Negative NEG^Negative    Source Midstream Urine    FISH BLADDER CANCER   Result Value Ref Range    Copath Report       Patient Name: NII MARQUIS  MR#: [id number]  Specimen #: [id number]  Collected: 9/17/2018  Received: 9/18/2018  Reported: 9/27/2018 11:04  Ordering Phy(s): JAGJIT CONTRERAS    For improved result formatting, select 'View Enhanced Report Format' under   Linked Documents section.    SPECIMEN/STAIN PROCESS:  Urine, voided with FISH       FISH-CEP 3 x 1, FISH-CEP 7 x 1,  FISH-CEP 17 x 1, FISH-LCI 9P21 x 1,   UroVysion x 1    ----------------------------------------------------------------    CYTOLOGIC INTERPRETATION:    Urine, voided with FISH: Negative UroVysion test  Specimen Adequacy: Satisfactory for evaluation.    I have personally reviewed all specimens and/or slides, including the   listed special stains, and used them  with my medical judgement to determine or confirm the final diagnosis.    Electronically signed out by:  Eduardo Muller M.D., [facility]    Processed and screened at [facility]    CLINICAL HISTORY:  Deb liz has history of bladder cancer.    ,    GROSS:  Urine, voided with FISH:  Received 70 ml of yellow, clear fluid, processed   as 1 slide made for UroVysion FISH.    Negative:  No interphase cytogenetic changes consistent with urothelial   carcinoma by using the UroVysion FISH  probe set.  This test result does not rule out the possibility that the   patient may have a low-grade  non-invasive papillary urothelial carcinoma.    METHODS:  Florescence in-situ hybridization (FISH) was performed on urine using the   [employer] UroVysion DNA probe set to the  centromere region of chromosome 3, 7, and 17 and the 9p21 locus.  At least   25 non-inflammatory cells were  scored. [facility], validated the   performance characteristics of this  assay.    MICROSCOPIC:  Microscopic examination is performed.  Yesenia Dominguez MD, Cytopathology fellow    Eduardo Muller MD    CPT Codes:  A: 40992-DHENM    TESTING LAB LOCATION:  [facility], [address]   [id number] [phone]    COLLECTION SITE:  Client:  [facility]  Location:  [facility] (S)    Resident  [id number]             ASSESSMENT / PLAN:       ICD-10-CM    1. Medicare annual wellness visit, subsequent Z00.00            End of Life  "Planning:  Patient currently has an advanced directive: Yes.  Practitioner is supportive of decision.        COUNSELING:    Reviewed preventive health counseling, as reflected in patient instructions         Consider AAA screening for ages 65-75 and smoking history         Regular exercise         Healthy diet/nutrition         Vision screening         Hearing screening         Dental care        BP Readings from Last 1 Encounters:     09/17/18   144/86         Estimated body mass index is 20.53 kg/m  as calculated from the following:      Height as of 9/17/18: 1.727 m (5' 8\").      Weight as of 9/17/18: 61.2 kg (135 lb).                     reports that he has quit smoking. he has never used smokeless tobacco.            Appropriate preventive services were discussed with this patient, including applicable screening as appropriate for cardiovascular disease, diabetes, osteopenia/osteoporosis, and glaucoma.  As appropriate for age/gender, discussed screening for colorectal cancer, prostate cancer, breast cancer, and cervical cancer. Checklist reviewing preventive services available has been given to the patient.        Reviewed patients plan of care and provided an AVS. The Basic Care Plan (routine screening as documented in Health Maintenance) for Lobo meets the Care Plan requirement. This Care Plan has been established and reviewed with the Patient.        Counseling Resources:    ATP IV Guidelines  Pooled Cohorts Equation Calculator    Breast Cancer Risk Calculator    FRAX Risk Assessment    ICSI Preventive Guidelines    Dietary Guidelines for Americans, 2010    USDA's MyPlate    ASA Prophylaxis    Lung CA Screening        EVERTON TRIPLETT MD    [facility]  "

## 2019-02-19 NOTE — PROGRESS NOTES
"  SUBJECTIVE:   Lobo Isaacs is a 68 year old male who presents for Preventive Visit.  {PVP to remind patient that this is not necessarily a physical exam; physical exam may or may not be done:568820::\"click delete button to remove this line now\"}  {PVP to inform patient that additional E&M charge may apply, if additional problems addressed:601749::\"click delete button to remove this line now\"}  Are you in the first 12 months of your Medicare Part B coverage?  { :407916::\"No\"}    Physical Health:    In general, how would you rate your overall physical health? { :761531}    Outside of work, how many days during the week do you exercise? { :641389}    Outside of work, approximately how many minutes a day do you exercise?{ :443248}    If you drink alcohol do you typically have >3 drinks per day or >7 drinks per week? { :243546}    Do you usually eat at least 4 servings of fruit and vegetables a day, include whole grains & fiber and avoid regularly eating high fat or \"junk\" foods? { :289596::\"Yes\"}    Do you have any problems taking medications regularly?  { :199386::\"No\"}    Do you have any side effects from medications? { :036973}    Needs assistance for the following daily activities: { :377857}    Which of the following safety concerns are present in your home?  { :761286::\"none identified\"}     Hearing impairment: { :529109}    In the past 6 months, have you been bothered by leaking of urine? { :614823}    Mental Health:    In general, how would you rate your overall mental or emotional health? { :016024}  PHQ-2 Score: (P) 0    Do you feel safe in your environment? { :661254}    Do you have a Health Care Directive? { :413745}    Additional concerns to address?  {If YES, notify patient they may be responsible for a copay, coinsurance or deductible if the visit today includes services such as checking on a sore throat, having an x-ray or lab test, or treating and evaluating a new or existing condition " ":026458::\"No\"}    Fall risk:  { :519962}  {If any of the above assessments are answered yes, consider ordering appropriate referrals (Optional):295319::\"click delete button to remove this line now\"}  Cognitive Screening: { :008652}    {Do you have sleep apnea, excessive snoring or daytime drowsiness? (Optional):076213}    {Outside tests to abstract? :656792}    {additional problems to add (Optional):947526}    Reviewed and updated as needed this visit by clinical staff         Reviewed and updated as needed this visit by Provider        Social History     Tobacco Use     Smoking status: Former Smoker     Smokeless tobacco: Never Used     Tobacco comment: SMOKED BRIEFLY IN HIS 20'S   Substance Use Topics     Alcohol use: No                           Current providers sharing in care for this patient include:   Patient Care Team:  Efra Crockett MD as PCP - General (Family Practice)  Efra Crockett MD as PCP - Assigned PCP  Cody Torres MD as MD (Urology)    The following health maintenance items are reviewed in Epic and correct as of today:  Health Maintenance   Topic Date Due     HEPATITIS C SCREENING  10[birth year]12/1966     ZOSTER IMMUNIZATION (1 of 2) 10/12/1998     FALL RISK ASSESSMENT  10/06/2017     COLONOSCOPY Q5 YR  01/29/2018     PHQ-2 Q1 YR  03/06/2019     ADVANCE DIRECTIVE PLANNING Q5 YRS  07/23/2020     LIPID SCREEN Q5 YR MALE (SYSTEM ASSIGNED)  12/07/2022     DTAP/TDAP/TD IMMUNIZATION (2 - Td) 12/27/2022     INFLUENZA VACCINE  Completed     AORTIC ANEURYSM SCREENING (SYSTEM ASSIGNED)  Completed     IPV IMMUNIZATION  Aged Out     MENINGITIS IMMUNIZATION  Aged Out     {Chronicprobdata (Optional):816073}  {Decision Support (Optional):002395}    ROS:  {ROS COMP:713379}    OBJECTIVE:   There were no vitals taken for this visit. Estimated body mass index is 20.53 kg/m  as calculated from the following:    Height as of 9/17/18: 1.727 m (5' 8\").    Weight as of 9/17/18: 61.2 kg (135 " "lb).  EXAM:   {Exam :003824}    {Diagnostic Test Results (Optional):335917::\"Diagnostic Test Results:\",\"none \"}    ASSESSMENT / PLAN:   {Diag Picklist:844830}    End of Life Planning:  Patient currently has an advanced directive: { :346924}    COUNSELING:  {Medicare Counselin}    BP Readings from Last 1 Encounters:   18 144/86     Estimated body mass index is 20.53 kg/m  as calculated from the following:    Height as of 18: 1.727 m (5' 8\").    Weight as of 18: 61.2 kg (135 lb).    {BP Counseling- Complete if BP >= 120/80  (Optional):588297}  {Weight Management Plan (ACO) Complete if BMI is abnormal-  Ages 18-64  BMI >24.9.  Age 65+ with BMI <23 or >30 (Optional):786352}     reports that he has quit smoking. he has never used smokeless tobacco.  {Tobacco Cessation -- Complete if patient is a smoker (Optional):406611}    Appropriate preventive services were discussed with this patient, including applicable screening as appropriate for cardiovascular disease, diabetes, osteopenia/osteoporosis, and glaucoma.  As appropriate for age/gender, discussed screening for colorectal cancer, prostate cancer, breast cancer, and cervical cancer. Checklist reviewing preventive services available has been given to the patient.    Reviewed patients plan of care and provided an AVS. The {CarePlan:508925} for Lobo meets the Care Plan requirement. This Care Plan has been established and reviewed with the {PATIENT, FAMILY MEMBER, CAREGIVER:098380}.    Counseling Resources:  ATP IV Guidelines  Pooled Cohorts Equation Calculator  Breast Cancer Risk Calculator  FRAX Risk Assessment  ICSI Preventive Guidelines  Dietary Guidelines for Americans, 2010  USDA's MyPlate  ASA Prophylaxis  Lung CA Screening    EVERTON TRIPLETT MD  [facility]  "

## 2019-02-19 NOTE — PATIENT INSTRUCTIONS
Preventive Health Recommendations:     See your health care provider every year to    Review health changes.     Discuss preventive care.      Review your medicines if your doctor has prescribed any.    Talk with your health care provider about whether you should have a test to screen for prostate cancer (PSA).    Every 3 years, have a diabetes test (fasting glucose). If you are at risk for diabetes, you should have this test more often.    Every 5 years, have a cholesterol test. Have this test more often if you are at risk for high cholesterol or heart disease.     Every 10 years, have a colonoscopy. Or, have a yearly FIT test (stool test). These exams will check for colon cancer.    Talk to with your health care provider about screening for Abdominal Aortic Aneurysm if you have a family history of AAA or have a history of smoking.  Shots:     Get a flu shot each year.     Get a tetanus shot every 10 years.     Talk to your doctor about your pneumonia vaccines. There are now two you should receive - Pneumovax (PPSV 23) and Prevnar (PCV 13).    Talk to your pharmacist about a shingles vaccine.     Talk to your doctor about the hepatitis B vaccine.  Nutrition:     Eat at least 5 servings of fruits and vegetables each day.     Eat whole-grain bread, whole-wheat pasta and brown rice instead of white grains and rice.     Get adequate Calcium and Vitamin D.   Lifestyle    Exercise for at least 150 minutes a week (30 minutes a day, 5 days a week). This will help you control your weight and prevent disease.     Limit alcohol to one drink per day.     No smoking.     Wear sunscreen to prevent skin cancer.     See your dentist every six months for an exam and cleaning.     See your eye doctor every 1 to 2 years to screen for conditions such as glaucoma, macular degeneration and cataracts.    Personalized Prevention Plan  You are due for the preventive services outlined below.  Your care team is available to assist you in  scheduling these services.  If you have already completed any of these items, please share that information with your care team to update in your medical record.    Health Maintenance Due   Topic Date Due     Hepatitis C Screening  10[birth year]12/1966     Zoster (Shingles) Vaccine (1 of 2) 10/12/1998     FALL RISK ASSESSMENT  10/06/2017     Colonoscopy - every 5 years  01/29/2018     Depression Assessment 2 - yearly  03/06/2019     History of urinary retention    Problem #2 hypertension well controlled at this time    Problem #3 hyperlipidemia with an LDL goal of less than 1    Problem #4 bilateral neurosensory hearing loss    Problem #5 cataract left eye    Problem #6 history of right foot pain    Ongoing aches and pains    History of running     Problem #7 osteoarthritis involving the right knee    Improved     Problem #8 thigh numbness after ruptured blood vessel     Asymptomatic at present except numbness    Problem # 9 new skin lesion     Raised left lower lid lesion   [facility]

## 2019-02-25 DIAGNOSIS — E78.00 PURE HYPERCHOLESTEROLEMIA: ICD-10-CM

## 2019-02-25 RX ORDER — ATORVASTATIN CALCIUM 10 MG/1
10 TABLET, FILM COATED ORAL DAILY
Qty: 90 TABLET | Refills: 1 | Status: SHIPPED | OUTPATIENT
Start: 2019-02-25 | End: 2019-08-23

## 2019-02-25 NOTE — TELEPHONE ENCOUNTER
Reason for Call:  Medication or medication refill:    Do you use a [facility]?  Name of the pharmacy and phone number for the current request:  [facility]    Name of the medication requested: atorvastatin    Other request: pt requests 30-day supply    Can we leave a detailed message on this number? YES    Phone number patient can be reached at: Home number on file [phone] (home)    Best Time: any    Call taken on 2/25/2019 at 8:32 AM by MICHELLE JADE

## 2019-02-25 NOTE — TELEPHONE ENCOUNTER
"Requested Prescriptions   Pending Prescriptions Disp Refills     atorvastatin (LIPITOR) 10 MG tablet 90 tablet 1    Last Written Prescription Date:  05/21/2018  Last Fill Quantity: 90,  # refills: 1   Last office visit: 2/19/2019 with prescribing provider:  Dr. Crockett   Future Office Visit:   Sig: Take 1 tablet (10 mg) by mouth daily    Statins Protocol Passed - 2/25/2019  8:36 AM       Passed - LDL on file in past 12 months    Recent Labs   Lab Test 02/19/19  0911   *            Passed - No abnormal creatine kinase in past 12 months    No lab results found.            Passed - Recent (12 mo) or future (30 days) visit within the authorizing provider's specialty    Patient had office visit in the last 12 months or has a visit in the next 30 days with authorizing provider or within the authorizing provider's specialty.  See \"Patient Info\" tab in inbasket, or \"Choose Columns\" in Meds & Orders section of the refill encounter.             Passed - Medication is active on med list       Passed - Patient is age 18 or older        "

## 2019-03-16 DIAGNOSIS — I10 HYPERTENSION GOAL BP (BLOOD PRESSURE) < 140/80: ICD-10-CM

## 2019-03-18 ENCOUNTER — OFFICE VISIT (OUTPATIENT)
Dept: UROLOGY | Facility: CLINIC | Age: 69
End: 2019-03-18
Payer: COMMERCIAL

## 2019-03-18 VITALS
DIASTOLIC BLOOD PRESSURE: 70 MMHG | BODY MASS INDEX: 20.31 KG/M2 | WEIGHT: 134 LBS | HEIGHT: 68 IN | SYSTOLIC BLOOD PRESSURE: 148 MMHG

## 2019-03-18 DIAGNOSIS — C67.4 MALIGNANT NEOPLASM OF POSTERIOR WALL OF URINARY BLADDER (H): Primary | ICD-10-CM

## 2019-03-18 DIAGNOSIS — Z79.2 PROPHYLACTIC ANTIBIOTIC: ICD-10-CM

## 2019-03-18 LAB
ALBUMIN UR-MCNC: NEGATIVE MG/DL
APPEARANCE UR: CLEAR
BILIRUB UR QL STRIP: NEGATIVE
COLOR UR AUTO: YELLOW
GLUCOSE UR STRIP-MCNC: NEGATIVE MG/DL
HGB UR QL STRIP: ABNORMAL
KETONES UR STRIP-MCNC: NEGATIVE MG/DL
LEUKOCYTE ESTERASE UR QL STRIP: NEGATIVE
NITRATE UR QL: NEGATIVE
PH UR STRIP: 6 PH (ref 5–7)
SOURCE: ABNORMAL
SP GR UR STRIP: 1.01 (ref 1–1.03)
UROBILINOGEN UR STRIP-ACNC: 0.2 EU/DL (ref 0.2–1)

## 2019-03-18 PROCEDURE — 88120 CYTP URNE 3-5 PROBES EA SPEC: CPT | Mod: TC | Performed by: PATHOLOGY

## 2019-03-18 PROCEDURE — 81003 URINALYSIS AUTO W/O SCOPE: CPT | Performed by: UROLOGY

## 2019-03-18 PROCEDURE — 52000 CYSTOURETHROSCOPY: CPT | Performed by: UROLOGY

## 2019-03-18 PROCEDURE — 99213 OFFICE O/P EST LOW 20 MIN: CPT | Mod: 25 | Performed by: UROLOGY

## 2019-03-18 RX ORDER — METOPROLOL SUCCINATE 50 MG/1
TABLET, EXTENDED RELEASE ORAL
Qty: 90 TABLET | Refills: 1 | Status: SHIPPED | OUTPATIENT
Start: 2019-03-18 | End: 2019-09-09

## 2019-03-18 RX ORDER — CIPROFLOXACIN 500 MG/1
500 TABLET, FILM COATED ORAL ONCE
Qty: 1 TABLET | Refills: 0 | Status: SHIPPED | OUTPATIENT
Start: 2019-03-18 | End: 2019-03-18

## 2019-03-18 ASSESSMENT — MIFFLIN-ST. JEOR: SCORE: 1342.32

## 2019-03-18 ASSESSMENT — PAIN SCALES - GENERAL: PAINLEVEL: NO PAIN (0)

## 2019-03-18 NOTE — LETTER
RE: Lobo Isaacs  [address]  [facility] [address]     Dear Colleague,    Thank you for referring your patient, Lobo Isaacs, to the [facility] [place] [facility]. Please see a copy of my visit note below.    This very pleasant 78-year-old gentleman returns today for check cystoscopy.  We recall, he presented in 2015 with gross hematuria with very extensive involving the bladder by superficial bladder cancer.  This was extensively resected at the time every evening concerned about a bladder leak afterwards a left a catheter in for an extended period.  Subsequent cystogram showed no evidence of leak.  Pathology showed this to be low-grade superficial bladder cancer.  3 months later we did do a follow-up cystoscopy showed some additional tumor which was resected and was also low-grade superficial.  Since then the urothelium has remained stable.  At the last cystoscopy there were 2 small areas of inflammation on the anterior surface of the bladder which appeared to be relatively insignificant which we will continue to watch.  Patient has no major complaints at this time.     Procedure.  Cystoscopy.   Surgeon.    Melissa  Anesthesia.  Local anesthesia.  Description.  With the patient in the supine position, with the genital area prepped and draped in the customary fashion, with local anesthetic and the urethra, the flexible cystoscope was Inserted.  The penile urethra is normal.  The external sphincter is intact.  When viewed from verumontanum there is mild enlargement of the lateral lobes of the prostate.  There is no significant median lobe.  Interior of the bladder does show grade 1 trabeculation.  The ureteral orifices are in normal position with clear eflux.  There is no evidence of stone formation in the bladder.  I have carefully inspected the entire urothelium and see no obvious evidence of recurrent tumor.  Just inside the  "bladder neck at about 1:00 there is an area of slightly raised cells which I think is relatively benign but which we will continue to watch closely.  There were no other remarkable features.    Impression.  I discussed the situation very carefully with the patient in detail today.  All his disease has been low-grade superficial but initially it was very extensive.  The urothelium now seems to stabilize but will still need to be watched closely.  We did have a discussion about alternative or additional therapeutic options that may be considered including the consideration for intravesical treatment, or more major treatment if the disease, in the future becomes more aggressive and that may include considering such treatments of cystectomy and diversion.  This is carefully discussed with him.    Plan.  6 months for cystoscopy and urine cytology.    Time.  15 minutes spent in addition to procedure in order to review the records carefully go over all pertinent lab and other studies, and to discuss the need for close follow-up, potential alternative therapeutic strategies etc. as noted above    \"This dictation was performed with voice recognition software and may contain errors,  omissions and inadvertent word substitution.\"    Again, thank you for allowing me to participate in the care of your patient.      Sincerely,    Cody Torres MD      "

## 2019-03-18 NOTE — TELEPHONE ENCOUNTER
"Requested Prescriptions   Pending Prescriptions Disp Refills     metoprolol succinate ER (TOPROL-XL) 50 MG 24 hr tablet [Pharmacy Med Name: METOPROLOL SUCC ER 50 MG TAB] 30 tablet 0      Last Written Prescription Date:  2/15/19  Last Fill Quantity: 30,  # refills: 0   Last office visit: 2/19/2019 with prescribing provider:     Future Office Visit:     Sig: TAKE 1 TABLET BY MOUTH EVERY DAY    Beta-Blockers Protocol Passed - 3/16/2019 10:05 AM       Passed - Blood pressure under 140/90 in past 12 months    BP Readings from Last 3 Encounters:   03/18/19 148/70   02/19/19 132/74   09/17/18 144/86                Passed - Patient is age 6 or older       Passed - Recent (12 mo) or future (30 days) visit within the authorizing provider's specialty    Patient had office visit in the last 12 months or has a visit in the next 30 days with authorizing provider or within the authorizing provider's specialty.  See \"Patient Info\" tab in inbasket, or \"Choose Columns\" in Meds & Orders section of the refill encounter.             Passed - Medication is active on med list          "

## 2019-03-18 NOTE — TELEPHONE ENCOUNTER
" \"Return in about 6 months (around 8/19/2019) for Hypertension\" - from 2/19/19 OV.     Prescription approved per [facility] Refill Protocol.    "

## 2019-03-18 NOTE — PROGRESS NOTES
This very pleasant 78-year-old gentleman returns today for check cystoscopy.  We recall, he presented in 2015 with gross hematuria with very extensive involving the bladder by superficial bladder cancer.  This was extensively resected at the time every evening concerned about a bladder leak afterwards a left a catheter in for an extended period.  Subsequent cystogram showed no evidence of leak.  Pathology showed this to be low-grade superficial bladder cancer.  3 months later we did do a follow-up cystoscopy showed some additional tumor which was resected and was also low-grade superficial.  Since then the urothelium has remained stable.  At the last cystoscopy there were 2 small areas of inflammation on the anterior surface of the bladder which appeared to be relatively insignificant which we will continue to watch.  Patient has no major complaints at this time.     Procedure.  Cystoscopy.   Surgeon.    Melissa  Anesthesia.  Local anesthesia.  Description.  With the patient in the supine position, with the genital area prepped and draped in the customary fashion, with local anesthetic and the urethra, the flexible cystoscope was Inserted.  The penile urethra is normal.  The external sphincter is intact.  When viewed from verumontanum there is mild enlargement of the lateral lobes of the prostate.  There is no significant median lobe.  Interior of the bladder does show grade 1 trabeculation.  The ureteral orifices are in normal position with clear eflux.  There is no evidence of stone formation in the bladder.  I have carefully inspected the entire urothelium and see no obvious evidence of recurrent tumor.  Just inside the bladder neck at about 1:00 there is an area of slightly raised cells which I think is relatively benign but which we will continue to watch closely.  There were no other remarkable features.    Impression.  I discussed the situation very carefully with the patient in detail today.  All his disease  "has been low-grade superficial but initially it was very extensive.  The urothelium now seems to stabilize but will still need to be watched closely.  We did have a discussion about alternative or additional therapeutic options that may be considered including the consideration for intravesical treatment, or more major treatment if the disease, in the future becomes more aggressive and that may include considering such treatments of cystectomy and diversion.  This is carefully discussed with him.    Plan.  6 months for cystoscopy and urine cytology.    Time.  15 minutes spent in addition to procedure in order to review the records carefully go over all pertinent lab and other studies, and to discuss the need for close follow-up, potential alternative therapeutic strategies etc. as noted above    \"This dictation was performed with voice recognition software and may contain errors,  omissions and inadvertent word substitution.\"    "

## 2019-03-18 NOTE — NURSING NOTE
Chief Complaint   Patient presents with     RECHECK     Pt with a history of bladder cancer here for a three month cysto      Invasive Procedure Safety Checklist:    Procedure:     Action: Complete sections and checkboxes as appropriate.    Pre-procedure:  1. Patient ID Verified with 2 identifiers (Roxy and  or MRN) : YES    2. Procedure and site verified with patient/designee (when able) : YES    3. Accurate consent documentation in medical record : YES    4. H&P (or appropriate assessment) documented in medical record : YES  H&P must be up to 30 days prior to procedure an updated within 24 hours of                 Procedure as applicable.     5. Relevant diagnostic and radiology test results appropriately labeled and displayed as applicable : YES    6. Blood products, implants, devices, and/or special equipment available for the procedure as applicable : YES    7. Procedure site(s) marked with provider initials [Exclusions: N/A] : NO    8. Marking not required. Reason : Yes  Procedure does not require site marking    Time Out:     Time-Out performed immediately prior to starting procedure, including verbal and active participation of all team members addressing: YES    1. Correct patient identity.  2. Confirmed that the correct side and site are marked.  3. An accurate procedure to be done.  4. Agreement on the procedure to be done.  5. Correct patient position.  6. Relevant images and results are properly labeled and appropriately displayed.  7. The need to administer antibiotics or fluids for irrigation purposes during the procedure as applicable.  8. Safety precautions based on patient history or medication use.    During Procedure: Verification of correct person, site, and procedure occurs any time the responsibility for care of the patient is transferred to another member of the care team.    5mL 2% lidocaine hydrochloride Urojet instilled into urethra.    NDC# [address]-99  Lot #: [id number]  Expiration  Date:  9-20

## 2019-03-28 ENCOUNTER — TRANSFERRED RECORDS (OUTPATIENT)
Dept: HEALTH INFORMATION MANAGEMENT | Facility: CLINIC | Age: 69
End: 2019-03-28

## 2019-03-28 LAB — COPATH REPORT: NORMAL

## 2019-05-08 ENCOUNTER — TELEPHONE (OUTPATIENT)
Dept: UROLOGY | Facility: CLINIC | Age: 69
End: 2019-05-08

## 2019-05-08 NOTE — TELEPHONE ENCOUNTER
----- Message from Cody Torres MD sent at 5/8/2019  3:32 PM CDT -----  Could you please arrange for this patient to drop off a urine specimen for both the urine cytology and a FISH test.  A recent FISH test came back positive and I would like to double check this.  When the result does come back if it still positive we may need to arrange to do bladder biopsies and same-day surgery instead of what we have currently planned which is a cystoscopy in the office later in the year.  Thank you.  JH

## 2019-05-10 DIAGNOSIS — C67.9 BLADDER CANCER (H): Primary | ICD-10-CM

## 2019-05-10 PROCEDURE — 88120 CYTP URNE 3-5 PROBES EA SPEC: CPT | Mod: 90 | Performed by: UROLOGY

## 2019-05-10 PROCEDURE — 88112 CYTOPATH CELL ENHANCE TECH: CPT | Performed by: UROLOGY

## 2019-05-22 LAB — COPATH REPORT: NORMAL

## 2019-05-23 LAB — COPATH REPORT: NORMAL

## 2019-08-23 DIAGNOSIS — E78.00 PURE HYPERCHOLESTEROLEMIA: ICD-10-CM

## 2019-08-23 RX ORDER — ATORVASTATIN CALCIUM 10 MG/1
TABLET, FILM COATED ORAL
Qty: 90 TABLET | Refills: 1 | Status: SHIPPED | OUTPATIENT
Start: 2019-08-23 | End: 2020-02-17

## 2019-08-23 NOTE — TELEPHONE ENCOUNTER
"Requested Prescriptions   Pending Prescriptions Disp Refills     atorvastatin (LIPITOR) 10 MG tablet [Pharmacy Med Name: ATORVASTATIN 10 MG TABLET] 90 tablet 1     Sig: TAKE 1 TABLET BY MOUTH EVERY DAY  Last Written Prescription Date:  02/25/2019  Last Fill Quantity: 90 tablet,  # refills: 1   Last Office Visit: 2/19/2019 Keira  Future Office Visit:            Statins Protocol Passed - 8/23/2019  1:14 AM        Passed - LDL on file in past 12 months     Recent Labs   Lab Test 02/19/19  0911   *             Passed - No abnormal creatine kinase in past 12 months     No lab results found.             Passed - Recent (12 mo) or future (30 days) visit within the authorizing provider's specialty     Patient had office visit in the last 12 months or has a visit in the next 30 days with authorizing provider or within the authorizing provider's specialty.  See \"Patient Info\" tab in inbasket, or \"Choose Columns\" in Meds & Orders section of the refill encounter.              Passed - Medication is active on med list        Passed - Patient is age 18 or older        "

## 2019-09-09 DIAGNOSIS — I10 HYPERTENSION GOAL BP (BLOOD PRESSURE) < 140/80: ICD-10-CM

## 2019-09-09 NOTE — TELEPHONE ENCOUNTER
"Requested Prescriptions   Pending Prescriptions Disp Refills     metoprolol succinate ER (TOPROL-XL) 50 MG 24 hr tablet [Pharmacy Med Name: METOPROLOL SUCC ER 50 MG TAB] 90 tablet 1     Sig: TAKE 1 TABLET BY MOUTH EVERY DAY  Last Written Prescription Date:  3/18/19  Last Fill Quantity: 90 tab,  # refills: 1   Last office visit: 2/19/2019 with prescribing provider:  Keira   Future Office Visit:         Beta-Blockers Protocol Failed - 9/9/2019  1:18 AM        Failed - Blood pressure under 140/90 in past 12 months     BP Readings from Last 3 Encounters:   03/18/19 148/70   02/19/19 132/74   09/17/18 144/86                 Passed - Patient is age 6 or older        Passed - Recent (12 mo) or future (30 days) visit within the authorizing provider's specialty     Patient had office visit in the last 12 months or has a visit in the next 30 days with authorizing provider or within the authorizing provider's specialty.  See \"Patient Info\" tab in inbasket, or \"Choose Columns\" in Meds & Orders section of the refill encounter.              Passed - Medication is active on med list           "

## 2019-09-10 RX ORDER — METOPROLOL SUCCINATE 50 MG/1
TABLET, EXTENDED RELEASE ORAL
Qty: 90 TABLET | Refills: 1 | Status: SHIPPED | OUTPATIENT
Start: 2019-09-10 | End: 2020-03-02

## 2019-09-18 ENCOUNTER — OFFICE VISIT (OUTPATIENT)
Dept: UROLOGY | Facility: CLINIC | Age: 69
End: 2019-09-18
Payer: COMMERCIAL

## 2019-09-18 VITALS
WEIGHT: 134 LBS | BODY MASS INDEX: 20.31 KG/M2 | HEIGHT: 68 IN | HEART RATE: 80 BPM | SYSTOLIC BLOOD PRESSURE: 138 MMHG | DIASTOLIC BLOOD PRESSURE: 70 MMHG

## 2019-09-18 DIAGNOSIS — C67.9 MALIGNANT NEOPLASM OF URINARY BLADDER, UNSPECIFIED SITE (H): Primary | ICD-10-CM

## 2019-09-18 DIAGNOSIS — Z79.2 PROPHYLACTIC ANTIBIOTIC: ICD-10-CM

## 2019-09-18 LAB
ALBUMIN UR-MCNC: NEGATIVE MG/DL
APPEARANCE UR: CLEAR
BILIRUB UR QL STRIP: NEGATIVE
COLOR UR AUTO: YELLOW
GLUCOSE UR STRIP-MCNC: NEGATIVE MG/DL
HGB UR QL STRIP: ABNORMAL
KETONES UR STRIP-MCNC: NEGATIVE MG/DL
LEUKOCYTE ESTERASE UR QL STRIP: NEGATIVE
NITRATE UR QL: NEGATIVE
PH UR STRIP: 5.5 PH (ref 5–7)
SOURCE: ABNORMAL
SP GR UR STRIP: 1.01 (ref 1–1.03)
UROBILINOGEN UR STRIP-ACNC: 0.2 EU/DL (ref 0.2–1)

## 2019-09-18 PROCEDURE — 52000 CYSTOURETHROSCOPY: CPT | Performed by: UROLOGY

## 2019-09-18 PROCEDURE — 99213 OFFICE O/P EST LOW 20 MIN: CPT | Mod: 25 | Performed by: UROLOGY

## 2019-09-18 PROCEDURE — 88112 CYTOPATH CELL ENHANCE TECH: CPT | Performed by: UROLOGY

## 2019-09-18 PROCEDURE — 81003 URINALYSIS AUTO W/O SCOPE: CPT | Performed by: UROLOGY

## 2019-09-18 RX ORDER — CIPROFLOXACIN 500 MG/1
500 TABLET, FILM COATED ORAL ONCE
Qty: 1 TABLET | Refills: 0 | Status: SHIPPED | OUTPATIENT
Start: 2019-09-18 | End: 2020-03-03

## 2019-09-18 RX ORDER — LIDOCAINE HYDROCHLORIDE 20 MG/ML
JELLY TOPICAL ONCE
Status: COMPLETED | OUTPATIENT
Start: 2019-09-18 | End: 2019-09-18

## 2019-09-18 RX ADMIN — LIDOCAINE HYDROCHLORIDE: 20 JELLY TOPICAL at 09:20

## 2019-09-18 ASSESSMENT — MIFFLIN-ST. JEOR: SCORE: 1342.32

## 2019-09-18 ASSESSMENT — PAIN SCALES - GENERAL: PAINLEVEL: NO PAIN (0)

## 2019-09-18 NOTE — NURSING NOTE
Chief Complaint   Patient presents with     Cystoscopy     Bladder Cancer      Prior to the start of the procedure and with procedural staff participation, I verbally confirmed the patient s identity using two indicators, relevant allergies, that the procedure was appropriate and matched the consent or emergent situation, and that the correct equipment/implants were available. Immediately prior to starting the procedure I conducted the Time Out with the procedural staff and re-confirmed the patient s name, procedure, and site/side. (The Joint Commission universal protocol was followed.)  Yes    Sedation (Moderate or Deep): None  Tracy Carter LPN

## 2019-09-18 NOTE — PROGRESS NOTES
This very pleasant 68-year-old gentleman returns today for check cystoscopy.  We recall, he presented in 2015 with gross hematuria with very extensive involving the bladder by superficial bladder cancer.  This was extensively resected at the time every evening concerned about a bladder leak afterwards a left a catheter in for an extended period.  Subsequent cystogram showed no evidence of leak.  Pathology showed this to be low-grade superficial bladder cancer.  3 months later we did do a follow-up cystoscopy showed some additional tumor which was resected and was also low-grade superficial.  Since then the urothelium has remained stable  The patient has no symptoms at the present time.  6 months ago the cystoscopy was negative.  The patient does not observe blood in the urine or had infection.  His general health is otherwise stable  He did have some questions and concerns about the color of his semen.     Procedure.  Cystoscopy.   Surgeon.    Melissa  Anesthesia.  Local anesthesia.  Description.  With the patient in the supine position, with the genital area prepped and draped in the customary fashion, with local anesthetic and the urethra, the flexible cystoscope was Inserted.  The penile urethra is normal.  The external sphincter is intact.  When viewed from verumontanum there is mild enlargement of the lateral lobes of the prostate.  There is no significant median lobe.  The anterior the bladder does show mild trabeculation.  The ureteral orifices are in normal position.  There is no evidence of stone formation in the bladder.  There is a small area on the anterior surface of the bladder which is mildly inflamed although this is not showing evidence of obvious tumor.  There are no other remarkable features.    Discussion.  We had a careful discussion about both his previous history and the current findings.  We will send a urine cytology today and if there is any evidence of sinister cells in the urine we will  "need to biopsy this area.  Otherwise if the cytology is negative we will observe this and repeat cystoscopy in 6 months.  We did have a careful discussion about ongoing surveillance which is very important given the large volume of tumor in the bladder although this was all low-grade.  We did talk about alternative or different therapeutic strategies that might be considered should further disease be encountered.  We also discussed the mildly loss of the prostate, although is not getting significant symptoms at the moment it is a possibility in the future.  I also reassured about the color of the semen which was yellow which was not something he should be concerned about.  I did discuss the entire situation with the patient in detail today.  I answered all his questions.    Plan.  6 months for urine cytology and cystoscopy.    Time.  15 minutes was spent in addition to the procedure noted to discuss the findings, to review the previous and current records, to discuss ongoing management and other possible alternative therapeutic strategies and also to discuss other urologic issues as outlined above    \"This dictation was performed with voice recognition software and may contain errors,  omissions and inadvertent word substitution.\"    "

## 2019-09-18 NOTE — PATIENT INSTRUCTIONS

## 2019-09-18 NOTE — LETTER
9/18/2019       RE: Lobo Isaacs  [address]  [facility] [address]     Dear Colleague,    Thank you for referring your patient, Lobo Isaacs, to the [facility] [place] at [facility]. Please see a copy of my visit note below.    This very pleasant 68-year-old gentleman returns today for check cystoscopy.  We recall, he presented in 2015 with gross hematuria with very extensive involving the bladder by superficial bladder cancer.  This was extensively resected at the time every evening concerned about a bladder leak afterwards a left a catheter in for an extended period.  Subsequent cystogram showed no evidence of leak.  Pathology showed this to be low-grade superficial bladder cancer.  3 months later we did do a follow-up cystoscopy showed some additional tumor which was resected and was also low-grade superficial.  Since then the urothelium has remained stable  The patient has no symptoms at the present time.  6 months ago the cystoscopy was negative.  The patient does not observe blood in the urine or had infection.  His general health is otherwise stable  He did have some questions and concerns about the color of his semen.     Procedure.  Cystoscopy.   Surgeon.    Melissa  Anesthesia.  Local anesthesia.  Description.  With the patient in the supine position, with the genital area prepped and draped in the customary fashion, with local anesthetic and the urethra, the flexible cystoscope was Inserted.  The penile urethra is normal.  The external sphincter is intact.  When viewed from verumontanum there is mild enlargement of the lateral lobes of the prostate.  There is no significant median lobe.  The anterior the bladder does show mild trabeculation.  The ureteral orifices are in normal position.  There is no evidence of stone formation in the bladder.  There is a small area on the anterior surface of the bladder which is mildly  "inflamed although this is not showing evidence of obvious tumor.  There are no other remarkable features.    Discussion.  We had a careful discussion about both his previous history and the current findings.  We will send a urine cytology today and if there is any evidence of sinister cells in the urine we will need to biopsy this area.  Otherwise if the cytology is negative we will observe this and repeat cystoscopy in 6 months.  We did have a careful discussion about ongoing surveillance which is very important given the large volume of tumor in the bladder although this was all low-grade.  We did talk about alternative or different therapeutic strategies that might be considered should further disease be encountered.  We also discussed the mildly loss of the prostate, although is not getting significant symptoms at the moment it is a possibility in the future.  I also reassured about the color of the semen which was yellow which was not something he should be concerned about.  I did discuss the entire situation with the patient in detail today.  I answered all his questions.    Plan.  6 months for urine cytology and cystoscopy.    Time.  15 minutes was spent in addition to the procedure noted to discuss the findings, to review the previous and current records, to discuss ongoing management and other possible alternative therapeutic strategies and also to discuss other urologic issues as outlined above    \"This dictation was performed with voice recognition software and may contain errors,  omissions and inadvertent word substitution.\"      Again, thank you for allowing me to participate in the care of your patient.      Sincerely,    Cody Torres MD      "

## 2019-09-19 LAB — COPATH REPORT: NORMAL

## 2019-09-23 DIAGNOSIS — Z85.51 PERSONAL HISTORY OF MALIGNANT NEOPLASM OF BLADDER: Primary | ICD-10-CM

## 2020-02-17 DIAGNOSIS — E78.00 PURE HYPERCHOLESTEROLEMIA: ICD-10-CM

## 2020-02-17 RX ORDER — ATORVASTATIN CALCIUM 10 MG/1
10 TABLET, FILM COATED ORAL DAILY
Qty: 30 TABLET | Refills: 0 | Status: SHIPPED | OUTPATIENT
Start: 2020-02-17 | End: 2020-03-27

## 2020-02-17 NOTE — TELEPHONE ENCOUNTER
"Requested Prescriptions   Pending Prescriptions Disp Refills     atorvastatin (LIPITOR) 10 MG tablet    Last Written Prescription Date:  08/23/2019  Last Fill Quantity: 90 tablet,  # refills: 1   Last office visit: 2/19/2019 with prescribing provider:  FANI Crockett   Future Office Visit:     90 tablet 1     Sig: Take 1 tablet (10 mg) by mouth daily       Statins Protocol Passed - 2/17/2020  9:14 AM        Passed - LDL on file in past 12 months     Recent Labs   Lab Test 02/19/19  0911   *             Passed - No abnormal creatine kinase in past 12 months     No lab results found.             Passed - Recent (12 mo) or future (30 days) visit within the authorizing provider's specialty     Patient has had an office visit with the authorizing provider or a provider within the authorizing providers department within the previous 12 mos or has a future within next 30 days. See \"Patient Info\" tab in inbasket, or \"Choose Columns\" in Meds & Orders section of the refill encounter.              Passed - Medication is active on med list        Passed - Patient is age 18 or older           "

## 2020-03-02 DIAGNOSIS — I10 HYPERTENSION GOAL BP (BLOOD PRESSURE) < 140/80: ICD-10-CM

## 2020-03-02 NOTE — TELEPHONE ENCOUNTER
"Requested Prescriptions   Pending Prescriptions Disp Refills     metoprolol succinate ER (TOPROL-XL) 50 MG 24 hr tablet  Last Written Prescription Date:  9/10/2019  Last Fill Quantity: 90 tablet,  # refills: 1   Last office visit: 2/19/2019 with prescribing provider:  FANI Crockett   Future Office Visit:   Next 5 appointments (look out 90 days)    Mar 03, 2020  7:30 AM CST  Office Visit with Efra Crockett MD  [facility] ([facility] [address] [id number] [phone]          90 tablet 1     Sig: Take 1 tablet (50 mg) by mouth daily       Beta-Blockers Protocol Passed - 3/2/2020 11:41 AM        Passed - Blood pressure under 140/90 in past 12 months     BP Readings from Last 3 Encounters:   09/18/19 138/70   03/18/19 148/70   02/19/19 132/74                 Passed - Patient is age 6 or older        Passed - Recent (12 mo) or future (30 days) visit within the authorizing provider's specialty     Patient has had an office visit with the authorizing provider or a provider within the authorizing providers department within the previous 12 mos or has a future within next 30 days. See \"Patient Info\" tab in inbasket, or \"Choose Columns\" in Meds & Orders section of the refill encounter.              Passed - Medication is active on med list           "

## 2020-03-03 ENCOUNTER — OFFICE VISIT (OUTPATIENT)
Dept: FAMILY MEDICINE | Facility: CLINIC | Age: 70
End: 2020-03-03
Payer: COMMERCIAL

## 2020-03-03 VITALS
WEIGHT: 135 LBS | HEIGHT: 69 IN | DIASTOLIC BLOOD PRESSURE: 88 MMHG | BODY MASS INDEX: 19.99 KG/M2 | SYSTOLIC BLOOD PRESSURE: 130 MMHG | HEART RATE: 65 BPM | RESPIRATION RATE: 16 BRPM | OXYGEN SATURATION: 97 %

## 2020-03-03 DIAGNOSIS — I10 HYPERTENSION GOAL BP (BLOOD PRESSURE) < 140/80: ICD-10-CM

## 2020-03-03 DIAGNOSIS — E78.5 HYPERLIPIDEMIA WITH TARGET LDL LESS THAN 130: Primary | ICD-10-CM

## 2020-03-03 DIAGNOSIS — Z00.00 ENCOUNTER FOR MEDICARE ANNUAL WELLNESS EXAM: ICD-10-CM

## 2020-03-03 DIAGNOSIS — D49.4 BLADDER TUMOR: ICD-10-CM

## 2020-03-03 DIAGNOSIS — Z12.5 SCREENING PSA (PROSTATE SPECIFIC ANTIGEN): ICD-10-CM

## 2020-03-03 LAB
ALBUMIN UR-MCNC: NEGATIVE MG/DL
ALT SERPL W P-5'-P-CCNC: 21 U/L (ref 0–70)
ANION GAP SERPL CALCULATED.3IONS-SCNC: 6 MMOL/L (ref 3–14)
APPEARANCE UR: CLEAR
BILIRUB UR QL STRIP: NEGATIVE
BUN SERPL-MCNC: 17 MG/DL (ref 7–30)
CALCIUM SERPL-MCNC: 8.6 MG/DL (ref 8.5–10.1)
CHLORIDE SERPL-SCNC: 100 MMOL/L (ref 94–109)
CHOLEST SERPL-MCNC: 166 MG/DL
CO2 SERPL-SCNC: 28 MMOL/L (ref 20–32)
COLOR UR AUTO: YELLOW
CREAT SERPL-MCNC: 0.82 MG/DL (ref 0.66–1.25)
CREAT UR-MCNC: 54 MG/DL
ERYTHROCYTE [DISTWIDTH] IN BLOOD BY AUTOMATED COUNT: 12.6 % (ref 10–15)
ERYTHROCYTE [SEDIMENTATION RATE] IN BLOOD BY WESTERGREN METHOD: 8 MM/H (ref 0–20)
GFR SERPL CREATININE-BSD FRML MDRD: 89 ML/MIN/{1.73_M2}
GLUCOSE SERPL-MCNC: 101 MG/DL (ref 70–99)
GLUCOSE UR STRIP-MCNC: NEGATIVE MG/DL
HCT VFR BLD AUTO: 40.3 % (ref 40–53)
HDLC SERPL-MCNC: 69 MG/DL
HGB BLD-MCNC: 13.4 G/DL (ref 13.3–17.7)
HGB UR QL STRIP: ABNORMAL
KETONES UR STRIP-MCNC: NEGATIVE MG/DL
LDLC SERPL CALC-MCNC: 88 MG/DL
LEUKOCYTE ESTERASE UR QL STRIP: NEGATIVE
MCH RBC QN AUTO: 30.2 PG (ref 26.5–33)
MCHC RBC AUTO-ENTMCNC: 33.3 G/DL (ref 31.5–36.5)
MCV RBC AUTO: 91 FL (ref 78–100)
MICROALBUMIN UR-MCNC: 23 MG/L
MICROALBUMIN/CREAT UR: 42.38 MG/G CR (ref 0–17)
NITRATE UR QL: NEGATIVE
NONHDLC SERPL-MCNC: 97 MG/DL
PH UR STRIP: 7 PH (ref 5–7)
PLATELET # BLD AUTO: 200 10E9/L (ref 150–450)
POTASSIUM SERPL-SCNC: 4.4 MMOL/L (ref 3.4–5.3)
PSA SERPL-ACNC: 0.36 UG/L (ref 0–4)
RBC # BLD AUTO: 4.43 10E12/L (ref 4.4–5.9)
RBC #/AREA URNS AUTO: NORMAL /HPF
SODIUM SERPL-SCNC: 134 MMOL/L (ref 133–144)
SOURCE: ABNORMAL
SP GR UR STRIP: 1.01 (ref 1–1.03)
TRIGL SERPL-MCNC: 46 MG/DL
UROBILINOGEN UR STRIP-ACNC: 0.2 EU/DL (ref 0.2–1)
WBC # BLD AUTO: 5.7 10E9/L (ref 4–11)
WBC #/AREA URNS AUTO: NORMAL /HPF

## 2020-03-03 PROCEDURE — 80061 LIPID PANEL: CPT | Performed by: FAMILY MEDICINE

## 2020-03-03 PROCEDURE — G0103 PSA SCREENING: HCPCS | Performed by: FAMILY MEDICINE

## 2020-03-03 PROCEDURE — 36415 COLL VENOUS BLD VENIPUNCTURE: CPT | Performed by: FAMILY MEDICINE

## 2020-03-03 PROCEDURE — 84460 ALANINE AMINO (ALT) (SGPT): CPT | Performed by: FAMILY MEDICINE

## 2020-03-03 PROCEDURE — 85027 COMPLETE CBC AUTOMATED: CPT | Performed by: FAMILY MEDICINE

## 2020-03-03 PROCEDURE — 80048 BASIC METABOLIC PNL TOTAL CA: CPT | Performed by: FAMILY MEDICINE

## 2020-03-03 PROCEDURE — 82043 UR ALBUMIN QUANTITATIVE: CPT | Performed by: FAMILY MEDICINE

## 2020-03-03 PROCEDURE — 85652 RBC SED RATE AUTOMATED: CPT | Performed by: FAMILY MEDICINE

## 2020-03-03 PROCEDURE — 81001 URINALYSIS AUTO W/SCOPE: CPT | Performed by: FAMILY MEDICINE

## 2020-03-03 PROCEDURE — 99397 PER PM REEVAL EST PAT 65+ YR: CPT | Performed by: FAMILY MEDICINE

## 2020-03-03 RX ORDER — METOPROLOL SUCCINATE 50 MG/1
50 TABLET, EXTENDED RELEASE ORAL DAILY
Qty: 90 TABLET | Refills: 3 | Status: SHIPPED | OUTPATIENT
Start: 2020-03-03 | End: 2021-03-29

## 2020-03-03 ASSESSMENT — MIFFLIN-ST. JEOR: SCORE: 1349.8

## 2020-03-03 ASSESSMENT — ACTIVITIES OF DAILY LIVING (ADL): CURRENT_FUNCTION: NO ASSISTANCE NEEDED

## 2020-03-03 NOTE — TELEPHONE ENCOUNTER
"Requested Prescriptions   Pending Prescriptions Disp Refills     metoprolol succinate ER (TOPROL-XL) 50 MG 24 hr tablet 90 tablet 1     Sig: Take 1 tablet (50 mg) by mouth daily       Beta-Blockers Protocol Passed - 3/2/2020 11:44 AM        Passed - Blood pressure under 140/90 in past 12 months     BP Readings from Last 3 Encounters:   03/03/20 130/88   09/18/19 138/70   03/18/19 148/70                 Passed - Patient is age 6 or older        Passed - Recent (12 mo) or future (30 days) visit within the authorizing provider's specialty     Patient has had an office visit with the authorizing provider or a provider within the authorizing providers department within the previous 12 mos or has a future within next 30 days. See \"Patient Info\" tab in inbasket, or \"Choose Columns\" in Meds & Orders section of the refill encounter.              Passed - Medication is active on med list          "

## 2020-03-03 NOTE — PROGRESS NOTES
"SUBJECTIVE:   Lobo Isaacs is a 69 year old male who presents for Preventive Visit.    Are you in the first 12 months of your Medicare coverage?  No    Healthy Habits:    In general, how would you rate your overall health?  Very good    Frequency of exercise:  None    Do you usually eat at least 4 servings of fruit and vegetables a day, include whole grains    & fiber and avoid regularly eating high fat or \"junk\" foods?  No    Taking medications regularly:  Yes    Barriers to taking medications:  None    Medication side effects:  None    Ability to successfully perform activities of daily living:  No assistance needed    Home Safety:  No safety concerns identified    Hearing Impairment:  No hearing concerns    In the past 6 months, have you been bothered by leaking of urine?  No    In general, how would you rate your overall mental or emotional health?  Very good      PHQ-2 Total Score:    Additional concerns today:  No    Do you feel safe in your environment? 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    Also history of abscess in his bladder    History of urinary retention    Patient is chronically underweight has had a    Senile cataract involving his left eye h and hematuria     current medications include atorvastatin 10 mg daily Toprol-XL 50 mg daily and Tylenol on a daily basis    Immunization update he is due for update and his influenza although he had one in August so it should be okay and pneumococcal vaccine in the 13th and the 23rd 2014 so this is up-to-date his last tetanus shot was 2012 so he is up-to-date on that    It is recommended to get a " shingles vaccine herpes zoster vaccine also as a PHQ 2 and a fall risk assessment today as well      Have you ever done Advance Care Planning? (For example, a Health Directive, POLST, or a discussion with a medical provider or your loved ones about your wishes): Yes, advance care planning is on file.      Fall risk  Fallen 2 or more times in the past year?: No  Any fall with injury in the past year?: No    Cognitive Screening   1) Repeat 3 items (Leader, Season, Table)    2) Clock draw: NORMAL  3) 3 item recall: Recalls 3 objects  Results: 3 items recalled: COGNITIVE IMPAIRMENT LESS LIKELY    Mini-CogTM Copyright S Celia. Licensed by the author for use in [facility]; reprinted with permission (soaneta@[email or web address]). All rights reserved.      Do you have sleep apnea, excessive snoring or daytime drowsiness?: PT wife says pt has sleep apnea and snoring    Reviewed and updated as needed this visit by clinical staff  Tobacco  Allergies  Meds  Problems  Med Hx  Surg Hx  Fam Hx  Soc Hx          Reviewed and updated as needed this visit by Provider  Tobacco  Allergies  Meds  Problems  Med Hx  Surg Hx  Fam Hx        Social History     Tobacco Use     Smoking status: Former Smoker     Smokeless tobacco: Never Used     Tobacco comment: SMOKED BRIEFLY IN HIS 20'S   Substance Use Topics     Alcohol use: No     If you drink alcohol do you typically have >3 drinks per day or >7 drinks per week? No    Alcohol Use 3/3/2020   Prescreen: >3 drinks/day or >7 drinks/week? -   Prescreen: >3 drinks/day or >7 drinks/week? No   No flowsheet data found.        Hyperlipidemia Follow-Up      Are you regularly taking any medication or supplement to lower your cholesterol?   Yes- Atorvastatin 10 mg daily    Are you having muscle aches or other side effects that you think could be caused by your cholesterol lowering medication?  No    Hypertension Follow-up  Metoprolol extended release 50 mg at bedtime      Do you check your  blood pressure regularly outside of the clinic? No     Are you following a low salt diet? No    Are your blood pressures ever more than 140 on the top number (systolic) OR more   than 90 on the bottom number (diastolic), for example 140/90? No    Patient is being followed by Dr. CONTRERAS [facility] urology in [place]  For bladder cancer he had some cystoscopy about every 6 months  So far there is no recurrence only has ongoing issues with hematuria    Current providers sharing in care for this patient include:   Patient Care Team:  Efra Crockett MD as PCP - General (Family Practice)  Cody Contreras MD as MD (Urology)  Efra Crockett MD as Assigned PCP    The following health maintenance items are reviewed in Epic and correct as of today:  Health Maintenance   Topic Date Due     HEPATITIS C SCREENING  1950     ZOSTER IMMUNIZATION (1 of 2) 10/12/1998     PHQ-2  01/01/2020     FALL RISK ASSESSMENT  02/19/2020     MEDICARE ANNUAL WELLNESS VISIT  02/19/2020     ADVANCE CARE PLANNING  07/23/2020     DTAP/TDAP/TD IMMUNIZATION (2 - Td) 12/27/2022     LIPID  02/19/2024     COLONOSCOPY  03/28/2024     INFLUENZA VACCINE  Completed     PNEUMOCOCCAL IMMUNIZATION 65+ LOW/MEDIUM RISK  Completed     AORTIC ANEURYSM SCREENING (SYSTEM ASSIGNED)  Completed     IPV IMMUNIZATION  Aged Out     MENINGITIS IMMUNIZATION  Aged Out   I am recommending that he get a shingles vaccine    Lab work is in process  Labs reviewed in EPIC  BP Readings from Last 3 Encounters:   03/03/20 130/88   09/18/19 138/70   03/18/19 148/70    Wt Readings from Last 3 Encounters:   03/03/20 61.2 kg (135 lb)   09/18/19 60.8 kg (134 lb)   03/18/19 60.8 kg (134 lb)                  Patient Active Problem List   Diagnosis     Microscopic hematuria     Hyperlipidemia with target LDL less than 130     Primary bladder papillary carcinoma (H)     Hypertension goal BP (blood pressure) < 140/80     Bladder cancer (H)     Bladder  tumor     Abscess, bladder     Health Care Home     ACP (advance care planning)     Rotator cuff syndrome of left shoulder     Right foot pain     Primary osteoarthritis of right knee     Underweight     Bilateral sensorineural hearing loss     Senile cataract of left eye     Hematuria     Urinary retention     Past Surgical History:   Procedure Laterality Date     COLONOSCOPY       CYSTOSCOPY       CYSTOSCOPY, BIOPSY BLADDER, COMBINED N/A 3/8/2018    Procedure: COMBINED CYSTOSCOPY, BIOPSY BLADDER;;  Surgeon: Cody Torres MD;  Location:  OR     CYSTOSCOPY, CYSTOGRAM, COMBINED N/A 3/2/2015    Procedure: COMBINED CYSTOSCOPY, CYSTOGRAM;  Surgeon: Cody Torres MD;  Location:  OR     CYSTOSCOPY, FULGURATE BLEEDERS, EVACUATE CLOT(S), COMBINED N/A 3/2/2015    Procedure: COMBINED CYSTOSCOPY, FULGURATE BLEEDERS, EVACUATE CLOT(S);  Surgeon: Cody Torres MD;  Location:  OR     CYSTOSCOPY, FULGURATE BLEEDERS, EVACUATE CLOT(S), COMBINED N/A 3/15/2018    Procedure: COMBINED CYSTOSCOPY, FULGURATE BLEEDERS, EVACUATE CLOT(S);  CYSTOSCOPY, FULGURATION OF BLEEDERS.;  Surgeon: Cody Torres MD;  Location:  OR     CYSTOSCOPY, RETROGRADES, COMBINED  3/2/2015    Procedure: COMBINED CYSTOSCOPY, RETROGRADES;  Surgeon: Cody Torres MD;  Location:  OR     CYSTOSCOPY, RETROGRADES, COMBINED  7/7/2015    Procedure: COMBINED CYSTOSCOPY, RETROGRADES;  Surgeon: Cody Torres MD;  Location:  OR     CYSTOSCOPY, RETROGRADES, COMBINED Bilateral 3/8/2018    Procedure: COMBINED CYSTOSCOPY, RETROGRADES;  CYSTOSCOPY, BILATERAL RETROGRADES, TRANSURETHRAL RESECTION OF BLADDER TUMOR, BLADDER BIOPSY;  Surgeon: Cody Torres MD;  Location:  OR     CYSTOSCOPY, TRANSURETHRAL RESECTION (TUR) TUMOR BLADDER, COMBINED N/A 3/2/2015    Procedure: COMBINED CYSTOSCOPY, TRANSURETHRAL RESECTION (TUR) TUMOR BLADDER;  Surgeon: Cody Torres MD;  Location:  OR     CYSTOSCOPY,  TRANSURETHRAL RESECTION (TUR) TUMOR BLADDER, COMBINED N/A 2015    Procedure: COMBINED CYSTOSCOPY, TRANSURETHRAL RESECTION (TUR) TUMOR BLADDER;  Surgeon: Cody Torres MD;  Location:  OR     CYSTOSCOPY, TRANSURETHRAL RESECTION (TUR) TUMOR BLADDER, COMBINED N/A 3/8/2018    Procedure: COMBINED CYSTOSCOPY, TRANSURETHRAL RESECTION (TUR) TUMOR BLADDER;;  Surgeon: Cody Torres MD;  Location: [facility]     HERNIORRHAPHY INGUINAL Right 2015    Procedure: HERNIORRHAPHY INGUINAL;  Surgeon: Levi Warren MD;  Location:  [facility]     LAPAROSCOPIC HERNIORRHAPHY INGUINAL Right 2015    Procedure: LAPAROSCOPIC HERNIORRHAPHY INGUINAL;  Surgeon: Levi Warren MD;  [facility]: [facility]       Social History     Tobacco Use     Smoking status: Former Smoker     Smokeless tobacco: Never Used     Tobacco comment: SMOKED BRIEFLY IN HIS 20'S   Substance Use Topics     Alcohol use: No     Family History   Problem Relation Age of Onset     Heart Disease Mother      Cancer Father 87        Lung cancer,  age 88     Cancer - colorectal Sister 30         Current Outpatient Medications   Medication Sig Dispense Refill     atorvastatin (LIPITOR) 10 MG tablet Take 1 tablet (10 mg) by mouth daily 30 tablet 0     metoprolol succinate ER (TOPROL-XL) 50 MG 24 hr tablet TAKE 1 TABLET BY MOUTH EVERY DAY 90 tablet 1     Acetaminophen (TYLENOL PO) Take 500 mg by mouth every 4 hours as needed for mild pain or fever       No Known Allergies  Recent Labs   Lab Test 19  0911 18  0620  17  0935 10/06/16  0916 11/03/15  0930  05/28/15  0946   *  --   --  178*  --  144*  --  107   HDL 73  --   --  78  --  70  --  67   TRIG 50  --   --  70  --  52  --  55   ALT 20  --   --   --  21  --   --  19   CR 0.90 0.80   < > 0.85 0.93  --    < > 0.90   GFRESTIMATED 86 >90   < > 90 81  --    < > 84   GFRESTBLACK >90 >90   < > >90 >90  --    < > >90   POTASSIUM 4.5 4.3   < > 4.7 4.7  --    < > 4.6    < > =  "values in this interval not displayed.          Review of Systems   has Microscopic hematuria; Hyperlipidemia with target LDL less than 130; Primary bladder papillary carcinoma (H); Hypertension goal BP (blood pressure) < 140/80; Bladder cancer (H); Bladder tumor; Abscess, bladder; Health Care Home; ACP (advance care planning); Rotator cuff syndrome of left shoulder; Right foot pain; Primary osteoarthritis of right knee; Underweight; Bilateral sensorineural hearing loss; Senile cataract of left eye; Hematuria; and Urinary retention on their problem list.    Constitutional, HEENT, cardiovascular, pulmonary, gi and gu systems are negative, except as otherwise noted.  On a 40 dB screen he has absent at the 502,000 and 4000 Hz levels on the right side  On the left side is absent hearing on the 4000 Hz  He does have bilateral earwax  His rotator cuff syndrome is improved  Blood pressure is good control on current regimen  Bladder cancer without recurrence  Left rotator cuff syndrome proved  He has bilateral early cataracts but not affecting his vision to any extent  Patient is quite active physically strong slender has not had any recent falls      OBJECTIVE:   /88   Pulse 65   Resp 16   Ht 1.74 m (5' 8.5\")   Wt 61.2 kg (135 lb)   SpO2 97%   BMI 20.23 kg/m   Estimated body mass index is 20.23 kg/m  as calculated from the following:    Height as of this encounter: 1.74 m (5' 8.5\").    Weight as of this encounter: 61.2 kg (135 lb).  Physical Exam  GENERAL: healthy, alert and no distress  EYES: Eyes grossly normal to inspection, PERRL and conjunctivae and sclerae normal  HENT: ear canals and TM's normal, nose and mouth without ulcers or lesions  NECK: no adenopathy, no asymmetry, masses, or scars and thyroid normal to palpation  RESP: lungs clear to auscultation - no rales, rhonchi or wheezes  CV: regular rate and rhythm, normal S1 S2, no S3 or S4, no murmur, click or rub, no peripheral edema and peripheral " "pulses strong  ABDOMEN: soft, nontender, no hepatosplenomegaly, no masses and bowel sounds normal   (male): normal male genitalia without lesions or urethral discharge, no hernia  RECTAL: normal sphincter tone, no rectal masses, prostate normal size, smooth, nontender without nodules or masses  MS: no gross musculoskeletal defects noted, no edema  SKIN: no suspicious lesions or rashes  NEURO: Normal strength and tone, mentation intact and speech normal  PSYCH: mentation appears normal, affect normal/bright  LYMPH: no cervical, supraclavicular, axillary, or inguinal adenopathy  Diabetic foot exam: normal DP and PT pulses, no trophic changes or ulcerative lesions, normal sensory exam and normal monofilament exam    Diagnostic Test Results:  Labs reviewed in Epic  No results found for any visits on 03/03/20.    ASSESSMENT / PLAN:       ICD-10-CM    1. Hyperlipidemia with target LDL less than 130 E78.5 Lipid panel reflex to direct LDL Fasting     ALT   2. Hypertension goal BP (blood pressure) < 140/80 I10 Basic metabolic panel     Albumin Random Urine Quantitative with Creat Ratio     UA reflex to Microscopic and Culture     Erythrocyte sedimentation rate auto   3. Bladder tumor D49.4 CBC with platelets   4. Screening PSA (prostate specific antigen) Z12.5 Prostate spec antigen screen       COUNSELING:  Reviewed preventive health counseling, as reflected in patient instructions       Regular exercise       Healthy diet/nutrition       Vision screening       Hearing screening       Dental care       Bladder control       Fall risk prevention       Discussed getting hepatitis C screening    Estimated body mass index is 20.23 kg/m  as calculated from the following:    Height as of this encounter: 1.74 m (5' 8.5\").    Weight as of this encounter: 61.2 kg (135 lb).         reports that he has quit smoking. He has never used smokeless tobacco.      Appropriate preventive services were discussed with this patient, including " applicable screening as appropriate for cardiovascular disease, diabetes, osteopenia/osteoporosis, and glaucoma.  As appropriate for age/gender, discussed screening for colorectal cancer, prostate cancer, breast cancer, and cervical cancer. Checklist reviewing preventive services available has been given to the patient.    Reviewed patients plan of care and provided an AVS. The Basic Care Plan (routine screening as documented in Health Maintenance) for Lobo meets the Care Plan requirement. This Care Plan has been established and reviewed with the Patient and wife is with the patient today.    Counseling Resources:  ATP IV Guidelines  Pooled Cohorts Equation Calculator  Breast Cancer Risk Calculator  FRAX Risk Assessment  ICSI Preventive Guidelines  Dietary Guidelines for Americans, 2010  [employer]'s MyPlate  ASA Prophylaxis  Lung CA Screening    EVERTON TRIPLETT MD  [facility]    Identified Health Risks:    He is at risk for lack of exercise and has been provided with information to increase physical activity for the benefit of his well-being.  The patient was counseled and encouraged to consider modifying their diet and eating habits. He was provided with information on recommended healthy diet options.

## 2020-03-03 NOTE — PATIENT INSTRUCTIONS
CODMAN'S EXERCISES  ,THAT IS PENDULUM RANGE OF MOTION 30 EACH TWICE DAILY    THUMB UP EXERCISES INTO PAIN 30 EACH TWICE DAILY    INTERNAL  AND EXTERNAL ROTATION  with AND WITHOUT RESISTANCE OR WEIGHT 30 EACH TWICE DAILY    SWORD SHEATH EXERCISE 30 EACH TWICE DAILY    WALL STRETCH EXERCISE 30 SECONDS EACH TWICE DAILY    POSTERIOR SHOULDER STRETCH AND HOLD 3 10 SECOND STRETCHES TWICE DAILY    ISOMETRIC EXERCISE 60 DEGREES ABDUCTION, ADDUCTION, 90 DEGREE THUMB UP POSITION    AVOID PAINFUL ARC    ICE TWICE DAILY X 5 MINUTES PRIOR TO EXERCISE OR AS NEEDED FOR PAIN CONTROL  5 -10MINUTES WALKING WALK IN PLACE , DANCING OR AEROBIC EXERCISE THREE TIMES DAILY   REDUCES RISK OF DYING 50%  ,THAT IS 25 MINUTES PER DAY   BALANCE WITH KNEE RAISED TO PREVENT FALLS INITIALLY EYES OPEN ON ONE FOOT 6  PREVENTS FALLS 40%  STRENGTHENING UPPER EXTREMETIES WITH 80% MAXIMAL LIFT STRENGTHENS LUMBAR AND THORACIC SPINE  YOU CAN DO THE SAME THING WITH 1 POUND WEIGHTS  100 EACH POSTION X 5   EVERTON TRIPLETT JR., MD   (E78.5) Hyperlipidemia with target LDL less than 130  (primary encounter diagnosis)  Comment:    Plan: Lipid panel reflex to direct LDL Fasting, ALT             (I10) Hypertension goal BP (blood pressure) < 140/80  Comment:    Plan: Basic metabolic panel, Albumin Random Urine         Quantitative with Creat Ratio, UA reflex to         Microscopic and Culture, Erythrocyte         sedimentation rate auto             (D49.4) Bladder tumor  Comment:    Plan: CBC with platelets             (Z12.5) Screening PSA (prostate specific antigen)  Comment:    Plan: Prostate spec antigen screen\                         Patient Education   Personalized Prevention Plan  You are due for the preventive services outlined below.  Your care team is available to assist you in scheduling these services.  If you have already completed any of these items, please share that information with your care team to update in your medical record.  Health  Maintenance Due   Topic Date Due     Hepatitis C Screening  1950     Zoster (Shingles) Vaccine (1 of 2) 10/12/1998     PHQ-2  01/01/2020     FALL RISK ASSESSMENT  02/19/2020     Annual Wellness Visit  02/19/2020       Exercise for a Healthier Heart     Exercise with a friend. When activity is fun, you're more likely to stick with it.   You may wonder how you can improve the health of your heart. If you re thinking about exercise, you re on the right track. You don t need to become an athlete, but you do need a certain amount of brisk exercise to help strengthen your heart. If you have been diagnosed with a heart condition, your doctor may recommend exercise to help stabilize your condition. To help make exercise a habit, choose safe, fun activities.  Be sure to check with your healthcare provider before starting an exercise program.  Why exercise?  Exercising regularly offers many healthy rewards. It can help you do all of the following:    Improve your blood cholesterol level to help prevent further heart trouble    Lower your blood pressure to help prevent a stroke or heart attack    Control diabetes, or reduce your risk of getting this disease    Improve your heart and lung function    Reach and maintain a healthy weight    Make your muscles stronger and more limber so you can stay active    Prevent falls and fractures by slowing the loss of bone mass (osteoporosis)    Manage stress better    Reduce your blood pressure    Improve your sense of self and your body image  Exercise tips  Ease into your routine. Set small goals. Then build on them.  Exercise on most days. Aim for a total of 150 or more minutes of moderate to  vigorous intensity activity each week. Consider 40 minutes, 3 to 4 times a week. For best results, activity should last for 40 minutes on average. It is OK to work up to the 40 minute period over time. Examples of moderate-intensity activity is walking 1 mile in 15 minutes or 30 to 45  minutes of yard work.  Step up your daily activity level. Along with your exercise program, try being more active throughout the day. Walk instead of drive. Do more household tasks or yard work.  Choose one or more activities you enjoy. Walking is one of the easiest things you can do. You can also try swimming, riding a bike, dancing, or taking an exercise class.  Stop exercising and call your doctor if you:    Have chest pain or feel dizzy or lightheaded    Feel burning, tightness, pressure, or heaviness in your chest, neck, shoulders, back, or arms    Have unusual shortness of breath    Have increased joint or muscle pain    Have palpitations or an irregular heartbeat  Date Last Reviewed: 5/1/2016 2000-2019 [employer]. [address] [place] [id number]. All rights reserved. This information is not intended as a substitute for professional medical care. Always follow your healthcare professional's instructions.          Understanding [employer] MyPlate  The USDA (U.S. Department of Agriculture) has guidelines to help you make healthy food choices. These are called MyPlate. MyPlate shows the food groups that make up healthy meals using the image of a place setting. Before you eat, think about the healthiest choices for what to put onto your plate or into your cup or bowl. To learn more about building a healthy plate, visit www.choosemyplate.gov.    The food groups    Fruits. Any fruit or 100% fruit juice counts as part of the Fruit Group. Fruits may be fresh, canned, frozen, or dried, and may be whole, cut-up, or pureed. Make half your plate fruits and vegetables.    Vegetables. Any vegetable or 100% vegetable juice counts as a member of the Vegetable Group. Vegetables may be fresh, frozen, canned, or dried. They can be served raw or cooked and may be whole, cut-up, or mashed. Make half your plate fruits and vegetables.    Grains. All foods made from grains are part of the Grains Group. These  include wheat, rice, oats, cornmeal, and barley such as bread, pasta, oatmeal, cereal, tortillas, and grits. Grains should be no more than a quarter of your plate. At least half of your grains should be whole grains.    Protein. This group includes meat, poultry, seafood, beans and peas, eggs, processed soy products (like tofu), nuts (including nut butters), and seeds. Make protein choices no more than a quarter of your plate. Meat and poultry choices should be lean or low fat.    Dairy. All fluid milk products and foods made from milk that contain calcium, like yogurt and cheese, are part of the Dairy Group. (Foods that have little calcium, such as cream, butter, and cream cheese, are not part of the group.) Most dairy choices should be low-fat or fat-free.    Oils. These are fats that are liquid at room temperature. They include canola, corn, olive, soybean, and sunflower oil. Foods that are mainly oil include mayonnaise, certain salad dressings, and soft margarines. You should have only 5 to 7 teaspoons of oils a day. You probably already get this much from the food you eat.  Date Last Reviewed: 8/1/2017 2000-2019 [employer] [address] [place] [address], [place] [address]. All rights reserved. This information is not intended as a substitute for professional medical care. Always follow your healthcare professional's instructions.

## 2020-03-03 NOTE — TELEPHONE ENCOUNTER
Prescription approved per [facility] for 12 months of refills since last appointment, which was 03/03/20

## 2020-03-03 NOTE — LETTER
March 4, 2020      Lobo RUIZ Ferny  [address]  [facility] [address]        Dear ,    We are writing to inform you of your test results.    NORMAL COMPLETE BLOOD PANEL WBCS RBCS AND PLATELETS   NORMAL URINE ANALYSIS   NORMAL ERTHROCYTE SEDIMENTATION RATE IE INFLAMMATORY MARKER   NORMAL LIVER FUNCTION TEST   NORMAL LIPID PANEL   NORMAL PROSTATE SPECIFIC ANTIGEN   PROTEINURIA NOTED   SMALL BLOOD URINE ANALYSIS     Resulted Orders   Basic metabolic panel   Result Value Ref Range    Sodium 134 133 - 144 mmol/L    Potassium 4.4 3.4 - 5.3 mmol/L    Chloride 100 94 - 109 mmol/L    Carbon Dioxide 28 20 - 32 mmol/L    Anion Gap 6 3 - 14 mmol/L    Glucose 101 (H) 70 - 99 mg/dL      Comment:      Fasting specimen    Urea Nitrogen 17 7 - 30 mg/dL    Creatinine 0.82 0.66 - 1.25 mg/dL    GFR Estimate 89 >60 mL/min/[1.73_m2]      Comment:      Non  GFR Calc  Starting 12/18/2018, serum creatinine based estimated GFR (eGFR) will be   calculated using the Chronic Kidney Disease Epidemiology Collaboration   (CKD-EPI) equation.      GFR Estimate If Black >90 >60 mL/min/[1.73_m2]      Comment:       GFR Calc  Starting 12/18/2018, serum creatinine based estimated GFR (eGFR) will be   calculated using the Chronic Kidney Disease Epidemiology Collaboration   (CKD-EPI) equation.      Calcium 8.6 8.5 - 10.1 mg/dL   Lipid panel reflex to direct LDL Fasting   Result Value Ref Range    Cholesterol 166 <200 mg/dL    Triglycerides 46 <150 mg/dL      Comment:      Fasting specimen    HDL Cholesterol 69 >39 mg/dL    LDL Cholesterol Calculated 88 <100 mg/dL      Comment:      Desirable:       <100 mg/dl    Non HDL Cholesterol 97 <130 mg/dL   Albumin Random Urine Quantitative with Creat Ratio   Result Value Ref Range    Creatinine Urine 54 mg/dL    Albumin Urine mg/L 23 mg/L    Albumin Urine mg/g Cr 42.38 (H) 0 - 17 mg/g Cr   ALT   Result Value Ref Range    ALT 21 0 - 70 U/L   UA reflex to Microscopic  and Culture   Result Value Ref Range    Color Urine Yellow     Appearance Urine Clear     Glucose Urine Negative NEG^Negative mg/dL    Bilirubin Urine Negative NEG^Negative    Ketones Urine Negative NEG^Negative mg/dL    Specific Gravity Urine 1.015 1.003 - 1.035    Blood Urine Small (A) NEG^Negative    pH Urine 7.0 5.0 - 7.0 pH    Protein Albumin Urine Negative NEG^Negative mg/dL    Urobilinogen Urine 0.2 0.2 - 1.0 EU/dL    Nitrite Urine Negative NEG^Negative    Leukocyte Esterase Urine Negative NEG^Negative    Source Midstream Urine    Erythrocyte sedimentation rate auto   Result Value Ref Range    Sed Rate 8 0 - 20 mm/h   CBC with platelets   Result Value Ref Range    WBC 5.7 4.0 - 11.0 10e9/L    RBC Count 4.43 4.4 - 5.9 10e12/L    Hemoglobin 13.4 13.3 - 17.7 g/dL    Hematocrit 40.3 40.0 - 53.0 %    MCV 91 78 - 100 fl    MCH 30.2 26.5 - 33.0 pg    MCHC 33.3 31.5 - 36.5 g/dL    RDW 12.6 10.0 - 15.0 %    Platelet Count 200 150 - 450 10e9/L   Prostate spec antigen screen   Result Value Ref Range    PSA 0.36 0 - 4 ug/L      Comment:      Assay Method:  Chemiluminescence using Siemens Vista analyzer   Urine Microscopic   Result Value Ref Range    WBC Urine 0 - 5 OTO5^0 - 5 /HPF    RBC Urine O - 2 OTO2^O - 2 /HPF       If you have any questions or concerns, please call the clinic at the number listed above.       Sincerely,        EVERTON TRIPLETT MD

## 2020-03-16 DIAGNOSIS — Z85.51 PERSONAL HISTORY OF MALIGNANT NEOPLASM OF BLADDER: Primary | ICD-10-CM

## 2020-03-18 ENCOUNTER — OFFICE VISIT (OUTPATIENT)
Dept: UROLOGY | Facility: CLINIC | Age: 70
End: 2020-03-18
Payer: COMMERCIAL

## 2020-03-18 VITALS
DIASTOLIC BLOOD PRESSURE: 70 MMHG | WEIGHT: 135 LBS | BODY MASS INDEX: 19.99 KG/M2 | HEIGHT: 69 IN | HEART RATE: 80 BPM | SYSTOLIC BLOOD PRESSURE: 142 MMHG

## 2020-03-18 DIAGNOSIS — Z85.51 PERSONAL HISTORY OF MALIGNANT NEOPLASM OF BLADDER: ICD-10-CM

## 2020-03-18 LAB
ALBUMIN UR-MCNC: NEGATIVE MG/DL
APPEARANCE UR: CLEAR
BILIRUB UR QL STRIP: NEGATIVE
COLOR UR AUTO: YELLOW
GLUCOSE UR STRIP-MCNC: NEGATIVE MG/DL
HGB UR QL STRIP: ABNORMAL
KETONES UR STRIP-MCNC: NEGATIVE MG/DL
LEUKOCYTE ESTERASE UR QL STRIP: NEGATIVE
NITRATE UR QL: NEGATIVE
PH UR STRIP: 7 PH (ref 5–7)
SOURCE: ABNORMAL
SP GR UR STRIP: 1.02 (ref 1–1.03)
UROBILINOGEN UR STRIP-ACNC: 0.2 EU/DL (ref 0.2–1)

## 2020-03-18 PROCEDURE — 99213 OFFICE O/P EST LOW 20 MIN: CPT | Mod: 25 | Performed by: UROLOGY

## 2020-03-18 PROCEDURE — 52000 CYSTOURETHROSCOPY: CPT | Performed by: UROLOGY

## 2020-03-18 PROCEDURE — 00000103 ZZHCL STATISTIC CYTO-FISH QC 88120: Performed by: UROLOGY

## 2020-03-18 PROCEDURE — 81003 URINALYSIS AUTO W/O SCOPE: CPT | Performed by: UROLOGY

## 2020-03-18 ASSESSMENT — MIFFLIN-ST. JEOR: SCORE: 1349.8

## 2020-03-18 ASSESSMENT — PAIN SCALES - GENERAL: PAINLEVEL: NO PAIN (0)

## 2020-03-18 NOTE — NURSING NOTE
Chief Complaint   Patient presents with     Cystoscopy     Bladder Cancer      Prior to the start of the procedure and with procedural staff participation, I verbally confirmed the patient s identity using two indicators, relevant allergies, that the procedure was appropriate and matched the consent or emergent situation, and that the correct equipment/implants were available. Immediately prior to starting the procedure I conducted the Time Out with the procedural staff and re-confirmed the patient s name, procedure, and site/side. (The Joint Commission universal protocol was followed.)  Yes    Sedation (Moderate or Deep): None    Trcay Carter LPN

## 2020-03-18 NOTE — LETTER
3/18/2020       RE: Lobo Isaacs  [address]  [facility] [address]     Dear Colleague,    Thank you for referring your patient, Lobo Isaacs, to the [facility] [place] at [facility]. Please see a copy of my visit note below.      This very pleasant 69-year-old gentleman returns today for check cystoscopy.   We recall, he presented in 2015 with gross hematuria with very extensive involving the bladder by superficial bladder cancer.  This was extensively resected at the time every evening concerned about a bladder leak afterwards a left a catheter in for an extended period.  Subsequent cystogram showed no evidence of leak.  Pathology showed this to be low-grade superficial bladder cancer.  3 months later we did do a follow-up cystoscopy showed some additional tumor which was resected and was also low-grade superficial.  Since then the urothelium has remained stable  The patient has no symptoms at the present time.  6 months ago the cystoscopy was negative.  The patient does not observe blood in the urine or had infection.  His general health is otherwise stable  Last time we did observe atypical cytology.  He has no other significant complaints at the present time.     Procedure.  Cystoscopy.   Surgeon.    Melissa  Anesthesia.  Local anesthesia.  Description.  With the patient in the supine position, with the genital area prepped and draped in the customary fashion, with local anesthetic and the urethra, the flexible cystoscope was Inserted.  The penile urethra is normal.  The external sphincter is intact.  When viewed from verumontanum prostatic urethra shows mild hyperplasia of the prostate but the bladder is entered without difficulty.  There is grade 1 trabeculation noted in the bladder, there is no evidence of stone formation.  There is a patch of inflammatory lesion on the dome of the bladder that I have seen before that does not  "appear to be like anjel tumor and appears to be unchanged since the last visit.  There is mild inflammation mother to other areas that do appear somewhat inconsequential.    Impression.    As we recall he had very extensive tumor in the bladder when seen initially.  Fortunately this was low-grade tumor.  I am a little concerned about the atypical cytology 6 months ago and this patch on the dome of the bladder which we have been watching.  I will do a fish test today.  I have advised the patient that they have patches still present or if the fish test is positive then we will need to biopsy this area.  Until that time we will observe this in 6 months time and make a decision on whether we need to proceed with biopsy and fulguration at that time.  I went over the records in detail today.  He has a somewhat complex history with a very large tumor initially.  From that point of view is done exceedingly well and that there is little evidence of recurrence 6 of the possibilities in this 1 area in the bladder which is very small which we are observing.  Plan to re-cystoscope in 6 months take time to make a final decision how to proceed.  I did discuss the entire situation with the patient in detail today.  I answered all the questions    Plan.  Cystoscopy possible repeat fish test in 6 months    Time.  We did spend 15 minutes in addition to the procedure noted to go over previous records pathology reports other pertinent studies and discussed the findings at the end of the procedure talk about the need for fish test and to explain to the patient the findings in the bladder and other potential therapeutic strategies that may need to be considered    \"This dictation was performed with voice recognition software and may contain errors,  omissions and inadvertent word substitution.\"                                                                                                                                                    "                                                                                                                                                                                                                                                   Again, thank you for allowing me to participate in the care of your patient.      Sincerely,    Cody Torres MD

## 2020-03-18 NOTE — PROGRESS NOTES
This very pleasant 69-year-old gentleman returns today for check cystoscopy.   We recall, he presented in 2015 with gross hematuria with very extensive involving the bladder by superficial bladder cancer.  This was extensively resected at the time every evening concerned about a bladder leak afterwards a left a catheter in for an extended period.  Subsequent cystogram showed no evidence of leak.  Pathology showed this to be low-grade superficial bladder cancer.  3 months later we did do a follow-up cystoscopy showed some additional tumor which was resected and was also low-grade superficial.  Since then the urothelium has remained stable  The patient has no symptoms at the present time.  6 months ago the cystoscopy was negative.  The patient does not observe blood in the urine or had infection.  His general health is otherwise stable  Last time we did observe atypical cytology.  He has no other significant complaints at the present time.     Procedure.  Cystoscopy.   Surgeon.    Melissa  Anesthesia.  Local anesthesia.  Description.  With the patient in the supine position, with the genital area prepped and draped in the customary fashion, with local anesthetic and the urethra, the flexible cystoscope was Inserted.  The penile urethra is normal.  The external sphincter is intact.  When viewed from verumontanum prostatic urethra shows mild hyperplasia of the prostate but the bladder is entered without difficulty.  There is grade 1 trabeculation noted in the bladder, there is no evidence of stone formation.  There is a patch of inflammatory lesion on the dome of the bladder that I have seen before that does not appear to be like anjel tumor and appears to be unchanged since the last visit.  There is mild inflammation mother to other areas that do appear somewhat inconsequential.    Impression.    As we recall he had very extensive tumor in the bladder when seen initially.  Fortunately this was low-grade tumor.  I am  "a little concerned about the atypical cytology 6 months ago and this patch on the dome of the bladder which we have been watching.  I will do a fish test today.  I have advised the patient that they have patches still present or if the fish test is positive then we will need to biopsy this area.  Until that time we will observe this in 6 months time and make a decision on whether we need to proceed with biopsy and fulguration at that time.  I went over the records in detail today.  He has a somewhat complex history with a very large tumor initially.  From that point of view is done exceedingly well and that there is little evidence of recurrence 6 of the possibilities in this 1 area in the bladder which is very small which we are observing.  Plan to re-cystoscope in 6 months take time to make a final decision how to proceed.  I did discuss the entire situation with the patient in detail today.  I answered all the questions    Plan.  Cystoscopy possible repeat fish test in 6 months    Time.  We did spend 15 minutes in addition to the procedure noted to go over previous records pathology reports other pertinent studies and discussed the findings at the end of the procedure talk about the need for fish test and to explain to the patient the findings in the bladder and other potential therapeutic strategies that may need to be considered    \"This dictation was performed with voice recognition software and may contain errors,  omissions and inadvertent word substitution.\"                                                                                                                                                                                                                                                                                                                                                                                                  "

## 2020-03-18 NOTE — PATIENT INSTRUCTIONS
"     AFTER YOUR CYSTOSCOPY         You have just completed a cystoscopy, or \"cysto\", which allowed your physician to learn more about your bladder (or to remove a stent placed after surgery). We suggest that you continue to avoid caffeine, fruit juice, and alcohol for the next 24 hours, however, you are encouraged to return to your normal activities.       A few things that are considered normal after your cystoscopy:    * small amount of bleeding (or spotting) that clears within the next 24 hours    * slight burning sensation with urination    * sensation to of needing to avoid more frequently    * the feeling of \"air\" in your urine    * mild discomfort that is relieved with Tylonol        Please contact our office promptly if you:    * develop a fever above 101 degrees    * are unable to urinate    * develop bright red blood that does not stop    * severe pain or swelling        And of course, please contact our office with any concerns or questions [phone]  AFTER YOUR CYSTOSCOPY        You have just completed a cystoscopy, or \"cysto\", which allowed your physician to learn more about your bladder (or to remove a stent placed after surgery). We suggest that you continue to avoid caffeine, fruit juice, and alcohol for the next 24 hours, however, you are encouraged to return to your normal activities.         A few things that are considered normal after your cystoscopy:     * Small amount of bleeding (or spotting) that clears within the next 24 hours     * Slight burning sensation with urination     * Sensation to of needing to avoid more frequently     * The feeling of \"air\" in your urine     * Mild discomfort that is relieved with Tylenol        Please contact our office promptly if you:     * Develop a fever above 101 degrees     * Are unable to urinate     * Develop bright red blood that does not stop     * Severe pain or swelling         Please contact our office with any concerns or questions @[facility].  "

## 2020-03-20 LAB — COPATH REPORT: NORMAL

## 2020-09-18 DIAGNOSIS — Z85.51 PERSONAL HISTORY OF MALIGNANT NEOPLASM OF BLADDER: Primary | ICD-10-CM

## 2020-09-21 ENCOUNTER — OFFICE VISIT (OUTPATIENT)
Dept: UROLOGY | Facility: CLINIC | Age: 70
End: 2020-09-21
Payer: COMMERCIAL

## 2020-09-21 VITALS
DIASTOLIC BLOOD PRESSURE: 78 MMHG | WEIGHT: 132 LBS | SYSTOLIC BLOOD PRESSURE: 128 MMHG | HEIGHT: 68 IN | BODY MASS INDEX: 20 KG/M2 | HEART RATE: 80 BPM

## 2020-09-21 DIAGNOSIS — Z85.51 PERSONAL HISTORY OF MALIGNANT NEOPLASM OF BLADDER: ICD-10-CM

## 2020-09-21 LAB
ALBUMIN UR-MCNC: NEGATIVE MG/DL
APPEARANCE UR: CLEAR
BILIRUB UR QL STRIP: NEGATIVE
COLOR UR AUTO: YELLOW
GLUCOSE UR STRIP-MCNC: NEGATIVE MG/DL
HGB UR QL STRIP: ABNORMAL
KETONES UR STRIP-MCNC: NEGATIVE MG/DL
LEUKOCYTE ESTERASE UR QL STRIP: NEGATIVE
NITRATE UR QL: NEGATIVE
PH UR STRIP: 5.5 PH (ref 5–7)
SOURCE: ABNORMAL
SP GR UR STRIP: 1.02 (ref 1–1.03)
UROBILINOGEN UR STRIP-ACNC: 0.2 EU/DL (ref 0.2–1)

## 2020-09-21 PROCEDURE — 52000 CYSTOURETHROSCOPY: CPT | Performed by: UROLOGY

## 2020-09-21 PROCEDURE — 88120 CYTP URNE 3-5 PROBES EA SPEC: CPT | Mod: 26 | Performed by: UROLOGY

## 2020-09-21 PROCEDURE — 99213 OFFICE O/P EST LOW 20 MIN: CPT | Mod: 25 | Performed by: UROLOGY

## 2020-09-21 PROCEDURE — 81003 URINALYSIS AUTO W/O SCOPE: CPT | Performed by: UROLOGY

## 2020-09-21 RX ORDER — LIDOCAINE HYDROCHLORIDE 20 MG/ML
JELLY TOPICAL ONCE
Status: COMPLETED | OUTPATIENT
Start: 2020-09-21 | End: 2020-09-21

## 2020-09-21 RX ORDER — CIPROFLOXACIN 500 MG/1
500 TABLET, FILM COATED ORAL ONCE
Qty: 1 TABLET | Refills: 0 | Status: SHIPPED | OUTPATIENT
Start: 2020-09-21 | End: 2020-09-21

## 2020-09-21 RX ADMIN — LIDOCAINE HYDROCHLORIDE: 20 JELLY TOPICAL at 10:45

## 2020-09-21 ASSESSMENT — PAIN SCALES - GENERAL: PAINLEVEL: NO PAIN (0)

## 2020-09-21 ASSESSMENT — MIFFLIN-ST. JEOR: SCORE: 1328.25

## 2020-09-21 NOTE — LETTER
9/21/2020       RE: Lobo Isaacs  [address]  [facility] [address]     Dear Colleague,    Thank you for referring your patient, Lobo Isaacs, to the [facility] [place] at [facility]. Please see a copy of my visit note below.    This very pleasant 69-year-old gentleman returns today for check cystoscopy.  We recall, he presented in 2015 with gross hematuria with very extensive involving the bladder by superficial bladder cancer.  This was extensively resected at the time every evening concerned about a bladder leak afterwards a left a catheter in for an extended period.  Subsequent cystogram showed no evidence of leak.  Pathology showed this to be low-grade superficial bladder cancer.  3 months later we did do a follow-up cystoscopy showed some additional tumor which was resected and was also low-grade superficial.  Since then the urothelium has remained stable  The patient has no symptoms at the present time.  6 months ago the cystoscopy was negative.  The patient does not observe blood in the urine or had infection.  His general health is otherwise stable  Last time we did observe atypical cytology.  He has no other significant complaints at the present time.     Past Medical History:   Diagnosis Date     Anemia      Arthritis      Bladder cancer (H)      Hearing loss      History of blood transfusion 3/2015    bladder surgery(2 Units)     Hyperlipidemia      Hypertension      Mumps      Past Surgical History:   Procedure Laterality Date     COLONOSCOPY       CYSTOSCOPY       CYSTOSCOPY, BIOPSY BLADDER, COMBINED N/A 3/8/2018    Procedure: COMBINED CYSTOSCOPY, BIOPSY BLADDER;;  Surgeon: Cody Torres MD;  Location:  OR     CYSTOSCOPY, CYSTOGRAM, COMBINED N/A 3/2/2015    Procedure: COMBINED CYSTOSCOPY, CYSTOGRAM;  Surgeon: Cody Torres MD;  Location:  OR     CYSTOSCOPY, FULGURATE BLEEDERS, EVACUATE CLOT(S),  COMBINED N/A 3/2/2015    Procedure: COMBINED CYSTOSCOPY, FULGURATE BLEEDERS, EVACUATE CLOT(S);  Surgeon: Cody Torres MD;  Location:  OR     CYSTOSCOPY, FULGURATE BLEEDERS, EVACUATE CLOT(S), COMBINED N/A 3/15/2018    Procedure: COMBINED CYSTOSCOPY, FULGURATE BLEEDERS, EVACUATE CLOT(S);  CYSTOSCOPY, FULGURATION OF BLEEDERS.;  Surgeon: Cody Torres MD;  Location:  OR     CYSTOSCOPY, RETROGRADES, COMBINED  3/2/2015    Procedure: COMBINED CYSTOSCOPY, RETROGRADES;  Surgeon: Cody Torres MD;  Location:  OR     CYSTOSCOPY, RETROGRADES, COMBINED  7/7/2015    Procedure: COMBINED CYSTOSCOPY, RETROGRADES;  Surgeon: Cody Torres MD;  Location:  OR     CYSTOSCOPY, RETROGRADES, COMBINED Bilateral 3/8/2018    Procedure: COMBINED CYSTOSCOPY, RETROGRADES;  CYSTOSCOPY, BILATERAL RETROGRADES, TRANSURETHRAL RESECTION OF BLADDER TUMOR, BLADDER BIOPSY;  Surgeon: Cody Torres MD;  Location:  OR     CYSTOSCOPY, TRANSURETHRAL RESECTION (TUR) TUMOR BLADDER, COMBINED N/A 3/2/2015    Procedure: COMBINED CYSTOSCOPY, TRANSURETHRAL RESECTION (TUR) TUMOR BLADDER;  Surgeon: Cody Torres MD;  Location:  OR     CYSTOSCOPY, TRANSURETHRAL RESECTION (TUR) TUMOR BLADDER, COMBINED N/A 7/7/2015    Procedure: COMBINED CYSTOSCOPY, TRANSURETHRAL RESECTION (TUR) TUMOR BLADDER;  Surgeon: Cody Torres MD;  Location:  OR     CYSTOSCOPY, TRANSURETHRAL RESECTION (TUR) TUMOR BLADDER, COMBINED N/A 3/8/2018    Procedure: COMBINED CYSTOSCOPY, TRANSURETHRAL RESECTION (TUR) TUMOR BLADDER;;  Surgeon: Cody Torres MD;  Location:  OR     HERNIORRHAPHY INGUINAL Right 9/4/2015    Procedure: HERNIORRHAPHY INGUINAL;  Surgeon: Levi Warren MD;  Location: [facility]     LAPAROSCOPIC HERNIORRHAPHY INGUINAL Right 9/4/2015    Procedure: LAPAROSCOPIC HERNIORRHAPHY INGUINAL;  Surgeon: Levi Warren MD;  Location: [facility]       Current Outpatient Medications:      atorvastatin  "(LIPITOR) 10 MG tablet, TAKE 1 TABLET BY MOUTH EVERY DAY, Disp: 90 tablet, Rfl: 2     ciprofloxacin (CIPRO) 500 MG tablet, Take 1 tablet (500 mg) by mouth once for 1 dose, Disp: 1 tablet, Rfl: 0     metoprolol succinate ER (TOPROL-XL) 50 MG 24 hr tablet, Take 1 tablet (50 mg) by mouth daily, Disp: 90 tablet, Rfl: 3     Acetaminophen (TYLENOL PO), Take 500 mg by mouth every 4 hours as needed for mild pain or fever, Disp: , Rfl:   No current facility-administered medications for this visit.       ROS: 10 point ROS neg other than the symptoms noted above in the HPI.    /78   Pulse 80   Ht 1.727 m (5' 8\")   Wt 59.9 kg (132 lb)   BMI 20.07 kg/m       Procedure.  Cystoscopy.   Surgeon.    Melissa  Anesthesia.  Local anesthesia.  Description.  With the patient in the supine position, with the genital area prepped and draped in the customary fashion, with local anesthetic and the urethra, the flexible cystoscope was Inserted.  The penile urethra is normal.  The external sphincter is intact.  When viewed from vitamin Dominic there is only very mild enlargement of the lateral lobes of the prostate.  There is no significant median prostatic lobe.  The anterior the bladder is carefully inspected.  There is grade 1 trabeculation.  I have carefully inspected the urothelium I see no obvious evidence of recurrent tumor.  There is a little inflammation on the anterior surface of the bladder which I have carefully inspected which I have noted before and seems unchanged.  There is no evidence of stone formation.  The ureteral orifices and trigone are unremarkable.  There were no other remarkable features.    Impression.  I discussed the findings with the patient in detail today.  He has a history of low-grade bladder cancer but has this complexity, of having a leak from the bladder at initial treatment of a very large bladder tumor.  This healed up spontaneously and he was subsequently treated with BCG.  He has done well since " "then although a second resection was required not because of recurrence because the initial resection because of extensive involvement of the bladder was incomplete.  However his more recent examinations will be negative.  We have noticed some atypical cells but cystoscopy which we will continue to monitor.  We will be sending urine out for FISH test today.  However the urothelium seems stable.  I am confident that this point that we can wait 1 year before we repeat cystoscopy and a fish test.  I have explained to them that we will continue to monitor this however and that it the possibility of recurrence cannot be ruled out in the future.  I am reassured however by the fact that there is been a long period of time since initial diagnosis at this point that he has been free of disease and that the pathology has been low-grade.  He has been noted to have persistent microscopic hematuria which should not be a concern at this point but I have counseled him that should he observe gross hematuria should inform us promptly.  Went over the entire situation with the patient in detail today.  I answered all his questions..    Plan.  1 year for cystoscopy with fish test    Time.  50 minutes spent in addition to procedure noted to catheter discuss the previous history the complexity of the history over findings today, plans for follow-up the possibility of repeat treatment in the future if she has recurrent disease be observed and discussions about the possible options for treatment should that occur    \"This dictation was performed with voice recognition software and may contain errors,  omissions and inadvertent word substitution.\"      Again, thank you for allowing me to participate in the care of your patient.      Sincerely,    Cody Torres MD      "

## 2020-09-21 NOTE — PROGRESS NOTES
This very pleasant 69-year-old gentleman returns today for check cystoscopy.  We recall, he presented in 2015 with gross hematuria with very extensive involving the bladder by superficial bladder cancer.  This was extensively resected at the time every evening concerned about a bladder leak afterwards a left a catheter in for an extended period.  Subsequent cystogram showed no evidence of leak.  Pathology showed this to be low-grade superficial bladder cancer.  3 months later we did do a follow-up cystoscopy showed some additional tumor which was resected and was also low-grade superficial.  Since then the urothelium has remained stable  The patient has no symptoms at the present time.  6 months ago the cystoscopy was negative.  The patient does not observe blood in the urine or had infection.  His general health is otherwise stable  Last time we did observe atypical cytology.  He has no other significant complaints at the present time.     Past Medical History:   Diagnosis Date     Anemia      Arthritis      Bladder cancer (H)      Hearing loss      History of blood transfusion 3/2015    bladder surgery(2 Units)     Hyperlipidemia      Hypertension      Mumps      Past Surgical History:   Procedure Laterality Date     COLONOSCOPY       CYSTOSCOPY       CYSTOSCOPY, BIOPSY BLADDER, COMBINED N/A 3/8/2018    Procedure: COMBINED CYSTOSCOPY, BIOPSY BLADDER;;  Surgeon: Cody Torres MD;  Location:  OR     CYSTOSCOPY, CYSTOGRAM, COMBINED N/A 3/2/2015    Procedure: COMBINED CYSTOSCOPY, CYSTOGRAM;  Surgeon: Cody Torres MD;  Location:  OR     CYSTOSCOPY, FULGURATE BLEEDERS, EVACUATE CLOT(S), COMBINED N/A 3/2/2015    Procedure: COMBINED CYSTOSCOPY, FULGURATE BLEEDERS, EVACUATE CLOT(S);  Surgeon: Cody Torres MD;  Location:  OR     CYSTOSCOPY, FULGURATE BLEEDERS, EVACUATE CLOT(S), COMBINED N/A 3/15/2018    Procedure: COMBINED CYSTOSCOPY, FULGURATE BLEEDERS, EVACUATE CLOT(S);  CYSTOSCOPY,  FULGURATION OF BLEEDERS.;  Surgeon: Cody Torres MD;  Location:  OR     CYSTOSCOPY, RETROGRADES, COMBINED  3/2/2015    Procedure: COMBINED CYSTOSCOPY, RETROGRADES;  Surgeon: Cody Torres MD;  Location:  OR     CYSTOSCOPY, RETROGRADES, COMBINED  7/7/2015    Procedure: COMBINED CYSTOSCOPY, RETROGRADES;  Surgeon: Cody Torres MD;  Location:  OR     CYSTOSCOPY, RETROGRADES, COMBINED Bilateral 3/8/2018    Procedure: COMBINED CYSTOSCOPY, RETROGRADES;  CYSTOSCOPY, BILATERAL RETROGRADES, TRANSURETHRAL RESECTION OF BLADDER TUMOR, BLADDER BIOPSY;  Surgeon: Cody Torres MD;  Location:  OR     CYSTOSCOPY, TRANSURETHRAL RESECTION (TUR) TUMOR BLADDER, COMBINED N/A 3/2/2015    Procedure: COMBINED CYSTOSCOPY, TRANSURETHRAL RESECTION (TUR) TUMOR BLADDER;  Surgeon: Cody Torres MD;  Location:  OR     CYSTOSCOPY, TRANSURETHRAL RESECTION (TUR) TUMOR BLADDER, COMBINED N/A 7/7/2015    Procedure: COMBINED CYSTOSCOPY, TRANSURETHRAL RESECTION (TUR) TUMOR BLADDER;  Surgeon: Cody Torres MD;  Location:  OR     CYSTOSCOPY, TRANSURETHRAL RESECTION (TUR) TUMOR BLADDER, COMBINED N/A 3/8/2018    Procedure: COMBINED CYSTOSCOPY, TRANSURETHRAL RESECTION (TUR) TUMOR BLADDER;;  Surgeon: Cody Torres MD;  Location:  OR     HERNIORRHAPHY INGUINAL Right 9/4/2015    Procedure: HERNIORRHAPHY INGUINAL;  Surgeon: Levi Warren MD;  Location: [facility]     LAPAROSCOPIC HERNIORRHAPHY INGUINAL Right 9/4/2015    Procedure: LAPAROSCOPIC HERNIORRHAPHY INGUINAL;  Surgeon: Levi Warren MD;  Location: [facility]       Current Outpatient Medications:      atorvastatin (LIPITOR) 10 MG tablet, TAKE 1 TABLET BY MOUTH EVERY DAY, Disp: 90 tablet, Rfl: 2     ciprofloxacin (CIPRO) 500 MG tablet, Take 1 tablet (500 mg) by mouth once for 1 dose, Disp: 1 tablet, Rfl: 0     metoprolol succinate ER (TOPROL-XL) 50 MG 24 hr tablet, Take 1 tablet (50 mg) by mouth daily, Disp: 90 tablet, Rfl:  "3     Acetaminophen (TYLENOL PO), Take 500 mg by mouth every 4 hours as needed for mild pain or fever, Disp: , Rfl:   No current facility-administered medications for this visit.       ROS: 10 point ROS neg other than the symptoms noted above in the HPI.    /78   Pulse 80   Ht 1.727 m (5' 8\")   Wt 59.9 kg (132 lb)   BMI 20.07 kg/m       Procedure.  Cystoscopy.   Surgeon.    Melsisa  Anesthesia.  Local anesthesia.  Description.  With the patient in the supine position, with the genital area prepped and draped in the customary fashion, with local anesthetic and the urethra, the flexible cystoscope was Inserted.  The penile urethra is normal.  The external sphincter is intact.  When viewed from vitamin Dominic there is only very mild enlargement of the lateral lobes of the prostate.  There is no significant median prostatic lobe.  The anterior the bladder is carefully inspected.  There is grade 1 trabeculation.  I have carefully inspected the urothelium I see no obvious evidence of recurrent tumor.  There is a little inflammation on the anterior surface of the bladder which I have carefully inspected which I have noted before and seems unchanged.  There is no evidence of stone formation.  The ureteral orifices and trigone are unremarkable.  There were no other remarkable features.    Impression.  I discussed the findings with the patient in detail today.  He has a history of low-grade bladder cancer but has this complexity, of having a leak from the bladder at initial treatment of a very large bladder tumor.  This healed up spontaneously and he was subsequently treated with BCG.  He has done well since then although a second resection was required not because of recurrence because the initial resection because of extensive involvement of the bladder was incomplete.  However his more recent examinations will be negative.  We have noticed some atypical cells but cystoscopy which we will continue to monitor.  We " "will be sending urine out for FISH test today.  However the urothelium seems stable.  I am confident that this point that we can wait 1 year before we repeat cystoscopy and a fish test.  I have explained to them that we will continue to monitor this however and that it the possibility of recurrence cannot be ruled out in the future.  I am reassured however by the fact that there is been a long period of time since initial diagnosis at this point that he has been free of disease and that the pathology has been low-grade.  He has been noted to have persistent microscopic hematuria which should not be a concern at this point but I have counseled him that should he observe gross hematuria should inform us promptly.  Went over the entire situation with the patient in detail today.  I answered all his questions..    Plan.  1 year for cystoscopy with fish test    Time.  50 minutes spent in addition to procedure noted to catheter discuss the previous history the complexity of the history over findings today, plans for follow-up the possibility of repeat treatment in the future if she has recurrent disease be observed and discussions about the possible options for treatment should that occur    \"This dictation was performed with voice recognition software and may contain errors,  omissions and inadvertent word substitution.\"    "

## 2020-09-21 NOTE — NURSING NOTE
Prior to the start of the procedure and with procedural staff participation, I verbally confirmed the patient s identity using two indicators, relevant allergies, that the procedure was appropriate and matched the consent or emergent situation, and that the correct equipment/implants were available. Immediately prior to starting the procedure I conducted the Time Out with the procedural staff and re-confirmed the patient s name, procedure, and site/side. I have wiped the patient off with the povidone-Iodine solution, draped them,  used Lidocaine hydrochloride jelly, and instilled sterile water into the bladder. (The Joint Commission universal protocol was followed.)  Yes    Sedation (Moderate or Deep): None  5mL 2% lidocaine hydrochloride Urojet instilled into urethra.    NDC# [address]-0  Lot #: [id number]  Expiration Date:  03/22

## 2020-09-30 LAB — COPATH REPORT: NORMAL

## 2020-10-12 DIAGNOSIS — Z85.51 PERSONAL HISTORY OF MALIGNANT NEOPLASM OF BLADDER: Primary | ICD-10-CM

## 2020-10-12 RX ORDER — CEFAZOLIN SODIUM 1 G
1 VIAL (EA) INJECTION SEE ADMIN INSTRUCTIONS
Status: CANCELLED | OUTPATIENT
Start: 2020-10-12

## 2020-10-13 DIAGNOSIS — Z11.59 ENCOUNTER FOR SCREENING FOR OTHER VIRAL DISEASES: Primary | ICD-10-CM

## 2020-10-13 PROBLEM — Z85.51 PERSONAL HISTORY OF MALIGNANT NEOPLASM OF BLADDER: Status: ACTIVE | Noted: 2020-10-13

## 2020-10-22 ENCOUNTER — OFFICE VISIT (OUTPATIENT)
Dept: FAMILY MEDICINE | Facility: CLINIC | Age: 70
End: 2020-10-22
Payer: COMMERCIAL

## 2020-10-22 VITALS
WEIGHT: 134 LBS | RESPIRATION RATE: 16 BRPM | BODY MASS INDEX: 19.85 KG/M2 | OXYGEN SATURATION: 99 % | DIASTOLIC BLOOD PRESSURE: 80 MMHG | HEART RATE: 68 BPM | HEIGHT: 69 IN | SYSTOLIC BLOOD PRESSURE: 138 MMHG | TEMPERATURE: 97.6 F

## 2020-10-22 DIAGNOSIS — Z00.00 ENCOUNTER FOR MEDICARE ANNUAL WELLNESS EXAM: Primary | ICD-10-CM

## 2020-10-22 DIAGNOSIS — E78.00 PURE HYPERCHOLESTEROLEMIA: ICD-10-CM

## 2020-10-22 DIAGNOSIS — I10 HYPERTENSION GOAL BP (BLOOD PRESSURE) < 140/80: ICD-10-CM

## 2020-10-22 PROCEDURE — 99397 PER PM REEVAL EST PAT 65+ YR: CPT | Performed by: PHYSICIAN ASSISTANT

## 2020-10-22 ASSESSMENT — ENCOUNTER SYMPTOMS
CHILLS: 0
CONSTIPATION: 0
EYE PAIN: 0
ABDOMINAL PAIN: 0
HEMATOCHEZIA: 0
COUGH: 0
DIZZINESS: 0
DIARRHEA: 0
HEMATURIA: 0
NERVOUS/ANXIOUS: 0

## 2020-10-22 ASSESSMENT — ACTIVITIES OF DAILY LIVING (ADL): CURRENT_FUNCTION: NO ASSISTANCE NEEDED

## 2020-10-22 ASSESSMENT — MIFFLIN-ST. JEOR: SCORE: 1340.26

## 2020-10-22 NOTE — PROGRESS NOTES
"SUBJECTIVE:   Lobo Isaacs is a 70 year old male who presents for Preventive Visit.    Patient has been advised of split billing requirements and indicates understanding: Yes   Are you in the first 12 months of your Medicare coverage?  No    Healthy Habits:     In general, how would you rate your overall health?  Good    Frequency of exercise:  None    Do you usually eat at least 4 servings of fruit and vegetables a day, include whole grains    & fiber and avoid regularly eating high fat or \"junk\" foods?  No    Taking medications regularly:  Yes    Medication side effects:  None    Ability to successfully perform activities of daily living:  No assistance needed    Home Safety:  No safety concerns identified    Hearing Impairment:  No hearing concerns    In the past 6 months, have you been bothered by leaking of urine?  No    In general, how would you rate your overall mental or emotional health?  Good      PHQ-2 Total Score: 0    Additional concerns today:  No    Lobo is here for routine health physical  He is doing well  He was very active all summer spending time outside  In the wintertime he tends to stay busy clearing snow in his neighborhood     In 2013 he retired and the following year he was diagnosed with bladder cancer  He continues to follow with urology  His previous PCP Dr. Crockett recently retired. Lobo is here to establish care.       Do you feel safe in your environment? Yes    Have you ever done Advance Care Planning? (For example, a Health Directive, POLST, or a discussion with a medical provider or your loved ones about your wishes): Yes, advance care planning is on file.    Do you have sleep apnea, excessive snoring or daytime drowsiness?: no    Reviewed and updated as needed this visit by clinical staff   Allergies  Meds              Reviewed and updated as needed this visit by Provider                Social History     Tobacco Use     Smoking status: Former Smoker     Smokeless " "tobacco: Never Used     Tobacco comment: SMOKED BRIEFLY IN HIS 20'S   Substance Use Topics     Alcohol use: No         Alcohol Use 10/22/2020   Prescreen: >3 drinks/day or >7 drinks/week? Not Applicable   Prescreen: >3 drinks/day or >7 drinks/week? -               Current providers sharing in care for this patient include:   Patient Care Team:  Tito Bray PA-C as PCP - General (Physician Assistant)  Cody Torres MD as MD (Urology)  Efra Crockett MD as Assigned PCP    The following health maintenance items are reviewed in Epic and correct as of today:  Health Maintenance   Topic Date Due     HEPATITIS C SCREENING  1950     ZOSTER IMMUNIZATION (1 of 2) 10/12/1998     FALL RISK ASSESSMENT  03/03/2021     MEDICARE ANNUAL WELLNESS VISIT  10/22/2021     DTAP/TDAP/TD IMMUNIZATION (2 - Td) 12/27/2022     COLORECTAL CANCER SCREENING  03/28/2024     LIPID  03/03/2025     ADVANCE CARE PLANNING  10/27/2025     PHQ-2  Completed     INFLUENZA VACCINE  Completed     Pneumococcal Vaccine: 65+ Years  Completed     AORTIC ANEURYSM SCREENING (SYSTEM ASSIGNED)  Completed     Pneumococcal Vaccine: Pediatrics (0 to 5 Years) and At-Risk Patients (6 to 64 Years)  Aged Out     IPV IMMUNIZATION  Aged Out     MENINGITIS IMMUNIZATION  Aged Out     HEPATITIS B IMMUNIZATION  Aged Out           Review of Systems   Constitutional: Negative for chills.   HENT: Negative for congestion and ear pain.    Eyes: Negative for pain.   Respiratory: Negative for cough.    Cardiovascular: Negative for chest pain.   Gastrointestinal: Negative for abdominal pain, constipation, diarrhea and hematochezia.   Genitourinary: Negative for hematuria.   Neurological: Negative for dizziness.   Psychiatric/Behavioral: The patient is not nervous/anxious.        OBJECTIVE:   /80   Pulse 68   Temp 97.6  F (36.4  C) (Oral)   Resp 16   Ht 1.74 m (5' 8.5\")   Wt 60.8 kg (134 lb)   SpO2 99%   BMI 20.08 kg/m   Estimated body mass index " "is 20.08 kg/m  as calculated from the following:    Height as of this encounter: 1.74 m (5' 8.5\").    Weight as of this encounter: 60.8 kg (134 lb).  Physical Exam  GENERAL: healthy, alert and no distress  EYES: Eyes grossly normal to inspection, PERRL and conjunctivae and sclerae normal  HENT: ear canals and TM's normal, nose and mouth without ulcers or lesions  NECK: no adenopathy, no asymmetry, masses, or scars and thyroid normal to palpation  RESP: lungs clear to auscultation - no rales, rhonchi or wheezes  CV: regular rate and rhythm, normal S1 S2, no S3 or S4, no murmur, click or rub, no peripheral edema and peripheral pulses strong  ABDOMEN: soft, nontender, no hepatosplenomegaly, no masses and bowel sounds normal  MS: no gross musculoskeletal defects noted, no edema  SKIN: no suspicious lesions or rashes  NEURO: Normal strength and tone, mentation intact and speech normal  PSYCH: mentation appears normal, affect normal/bright    Diagnostic Test Results:  Labs reviewed in Epic  No results found for any visits on 10/22/20.    ASSESSMENT / PLAN:   Lobo was seen today for wellness visit.    Diagnoses and all orders for this visit:    Encounter for Medicare annual wellness exam    Hypertension goal BP (blood pressure) < 140/80    Pure hypercholesterolemia    Lobo had routine labs done in March 2020 -- not due for anything today  Will return in January 2021 for preoperative exam prior to cystoscopy      COUNSELING:  Reviewed preventive health counseling, as reflected in patient instructions       Regular exercise       Healthy diet/nutrition       Immunizations    Discussed Zoster immunization in the future              Hepatitis C screening -- will complete in the future when other labs being drawn    Estimated body mass index is 20.08 kg/m  as calculated from the following:    Height as of this encounter: 1.74 m (5' 8.5\").    Weight as of this encounter: 60.8 kg (134 lb).        He reports that he has quit " smoking. He has never used smokeless tobacco.      Appropriate preventive services were discussed with this patient, including applicable screening as appropriate for cardiovascular disease, diabetes, osteopenia/osteoporosis, and glaucoma.  As appropriate for age/gender, discussed screening for colorectal cancer, prostate cancer, breast cancer, and cervical cancer. Checklist reviewing preventive services available has been given to the patient.    Reviewed patients plan of care and provided an AVS. The Basic Care Plan (routine screening as documented in Health Maintenance) for Lobo meets the Care Plan requirement. This Care Plan has been established and reviewed with the Patient    Counseling Resources:  ATP IV Guidelines  Pooled Cohorts Equation Calculator  Breast Cancer Risk Calculator  Breast Cancer: Medication to Reduce Risk  FRAX Risk Assessment  ICSI Preventive Guidelines  Dietary Guidelines for Americans, 2010  USDA's MyPlate  ASA Prophylaxis  Lung CA Screening    Tito Bray PA-C  [facility]    Identified Health Risks:

## 2020-10-22 NOTE — PATIENT INSTRUCTIONS
Patient Education   Personalized Prevention Plan  You are due for the preventive services outlined below.  Your care team is available to assist you in scheduling these services.  If you have already completed any of these items, please share that information with your care team to update in your medical record.  Health Maintenance Due   Topic Date Due     Hepatitis C Screening  1950     Zoster (Shingles) Vaccine (1 of 2) 10/12/1998

## 2020-10-26 DIAGNOSIS — Z11.59 ENCOUNTER FOR SCREENING FOR OTHER VIRAL DISEASES: Primary | ICD-10-CM

## 2020-11-18 ENCOUNTER — APPOINTMENT (OUTPATIENT)
Dept: LAB | Facility: CLINIC | Age: 70
End: 2020-11-18
Payer: COMMERCIAL

## 2020-11-18 DIAGNOSIS — Z85.51 PERSONAL HISTORY OF MALIGNANT NEOPLASM OF BLADDER: ICD-10-CM

## 2020-11-18 DIAGNOSIS — C67.9 MALIGNANT NEOPLASM OF URINARY BLADDER, UNSPECIFIED SITE (H): Primary | ICD-10-CM

## 2020-11-18 PROCEDURE — 88112 CYTOPATH CELL ENHANCE TECH: CPT | Performed by: PATHOLOGY

## 2020-11-18 PROCEDURE — 99N1015 PR STATISTIC CYTO-FISH QC 88120: Performed by: UROLOGY

## 2020-11-20 LAB — COPATH REPORT: NORMAL

## 2020-12-01 LAB — COPATH REPORT: NORMAL

## 2020-12-22 DIAGNOSIS — E78.00 PURE HYPERCHOLESTEROLEMIA: ICD-10-CM

## 2020-12-22 RX ORDER — ATORVASTATIN CALCIUM 10 MG/1
10 TABLET, FILM COATED ORAL DAILY
Qty: 90 TABLET | Refills: 2 | OUTPATIENT
Start: 2020-12-22

## 2020-12-23 ENCOUNTER — TELEPHONE (OUTPATIENT)
Dept: FAMILY MEDICINE | Facility: CLINIC | Age: 70
End: 2020-12-23

## 2020-12-23 RX ORDER — ATORVASTATIN CALCIUM 10 MG/1
10 TABLET, FILM COATED ORAL DAILY
Qty: 90 TABLET | Refills: 3 | Status: SHIPPED | OUTPATIENT
Start: 2020-12-23 | End: 2021-12-16

## 2020-12-23 NOTE — TELEPHONE ENCOUNTER
Atorvastatin was refilled today.    Metoprolol was refilled for 1 year on 3/3/20-too early to refill and patient should follow up with [facility], [place], [place].    Writer called patient and wife, Gema, answered. Consent to communicate on file.    Writer informed Gema of above.    Gema verbalized understanding and in agreement with plan.  REUBEN Wu, MARYN, RN

## 2020-12-23 NOTE — TELEPHONE ENCOUNTER
Reason for Call: Request for an order or referral: order for presciption    Order or referral being requested: medication order for sharpe - 10mg, matrolpal - 50mg    Date needed: as soon as possible    Has the patient been seen by the PCP for this problem? YES    Additional comments: patients states saw provider in September/2020    Phone number Patient can be reached at:  Home number on file [phone] (home)    Best Time:  anytime    Can we leave a detailed message on this number?  YES    Call taken on 12/23/2020 at 10:45 AM by Kadi Echeverria

## 2021-01-15 ENCOUNTER — OFFICE VISIT (OUTPATIENT)
Dept: FAMILY MEDICINE | Facility: CLINIC | Age: 71
End: 2021-01-15
Payer: COMMERCIAL

## 2021-01-15 VITALS
SYSTOLIC BLOOD PRESSURE: 154 MMHG | RESPIRATION RATE: 16 BRPM | OXYGEN SATURATION: 98 % | DIASTOLIC BLOOD PRESSURE: 82 MMHG | BODY MASS INDEX: 20.59 KG/M2 | HEIGHT: 69 IN | HEART RATE: 84 BPM | TEMPERATURE: 96.9 F | WEIGHT: 139 LBS

## 2021-01-15 DIAGNOSIS — Z85.51 PERSONAL HISTORY OF MALIGNANT NEOPLASM OF BLADDER: ICD-10-CM

## 2021-01-15 DIAGNOSIS — R03.0 ELEVATED BLOOD PRESSURE READING WITHOUT DIAGNOSIS OF HYPERTENSION: ICD-10-CM

## 2021-01-15 DIAGNOSIS — Z01.818 PREOP GENERAL PHYSICAL EXAM: Primary | ICD-10-CM

## 2021-01-15 DIAGNOSIS — Z11.59 ENCOUNTER FOR SCREENING FOR OTHER VIRAL DISEASES: ICD-10-CM

## 2021-01-15 LAB
ALBUMIN SERPL-MCNC: 3.7 G/DL (ref 3.4–5)
ALP SERPL-CCNC: 91 U/L (ref 40–150)
ALT SERPL W P-5'-P-CCNC: 21 U/L (ref 0–70)
ANION GAP SERPL CALCULATED.3IONS-SCNC: 3 MMOL/L (ref 3–14)
AST SERPL W P-5'-P-CCNC: 16 U/L (ref 0–45)
BASOPHILS # BLD AUTO: 0 10E9/L (ref 0–0.2)
BASOPHILS NFR BLD AUTO: 0.3 %
BILIRUB SERPL-MCNC: 0.9 MG/DL (ref 0.2–1.3)
BUN SERPL-MCNC: 27 MG/DL (ref 7–30)
CALCIUM SERPL-MCNC: 8.6 MG/DL (ref 8.5–10.1)
CHLORIDE SERPL-SCNC: 104 MMOL/L (ref 94–109)
CO2 SERPL-SCNC: 30 MMOL/L (ref 20–32)
CREAT SERPL-MCNC: 0.87 MG/DL (ref 0.66–1.25)
DIFFERENTIAL METHOD BLD: ABNORMAL
EOSINOPHIL # BLD AUTO: 0.2 10E9/L (ref 0–0.7)
EOSINOPHIL NFR BLD AUTO: 3.2 %
ERYTHROCYTE [DISTWIDTH] IN BLOOD BY AUTOMATED COUNT: 12.4 % (ref 10–15)
GFR SERPL CREATININE-BSD FRML MDRD: 86 ML/MIN/{1.73_M2}
GLUCOSE SERPL-MCNC: 106 MG/DL (ref 70–99)
HCT VFR BLD AUTO: 40.5 % (ref 40–53)
HGB BLD-MCNC: 13.2 G/DL (ref 13.3–17.7)
LYMPHOCYTES # BLD AUTO: 1.8 10E9/L (ref 0.8–5.3)
LYMPHOCYTES NFR BLD AUTO: 29.9 %
MCH RBC QN AUTO: 29.8 PG (ref 26.5–33)
MCHC RBC AUTO-ENTMCNC: 32.6 G/DL (ref 31.5–36.5)
MCV RBC AUTO: 91 FL (ref 78–100)
MONOCYTES # BLD AUTO: 0.5 10E9/L (ref 0–1.3)
MONOCYTES NFR BLD AUTO: 8.4 %
NEUTROPHILS # BLD AUTO: 3.5 10E9/L (ref 1.6–8.3)
NEUTROPHILS NFR BLD AUTO: 58.2 %
PLATELET # BLD AUTO: 205 10E9/L (ref 150–450)
POTASSIUM SERPL-SCNC: 4.1 MMOL/L (ref 3.4–5.3)
PROT SERPL-MCNC: 7.5 G/DL (ref 6.8–8.8)
RBC # BLD AUTO: 4.43 10E12/L (ref 4.4–5.9)
SODIUM SERPL-SCNC: 137 MMOL/L (ref 133–144)
WBC # BLD AUTO: 6 10E9/L (ref 4–11)

## 2021-01-15 PROCEDURE — U0003 INFECTIOUS AGENT DETECTION BY NUCLEIC ACID (DNA OR RNA); SEVERE ACUTE RESPIRATORY SYNDROME CORONAVIRUS 2 (SARS-COV-2) (CORONAVIRUS DISEASE [COVID-19]), AMPLIFIED PROBE TECHNIQUE, MAKING USE OF HIGH THROUGHPUT TECHNOLOGIES AS DESCRIBED BY CMS-2020-01-R: HCPCS | Performed by: UROLOGY

## 2021-01-15 PROCEDURE — U0005 INFEC AGEN DETEC AMPLI PROBE: HCPCS | Performed by: UROLOGY

## 2021-01-15 PROCEDURE — 99214 OFFICE O/P EST MOD 30 MIN: CPT | Performed by: PHYSICIAN ASSISTANT

## 2021-01-15 PROCEDURE — 80053 COMPREHEN METABOLIC PANEL: CPT | Performed by: PHYSICIAN ASSISTANT

## 2021-01-15 PROCEDURE — 85025 COMPLETE CBC W/AUTO DIFF WBC: CPT | Performed by: PHYSICIAN ASSISTANT

## 2021-01-15 PROCEDURE — 36415 COLL VENOUS BLD VENIPUNCTURE: CPT | Performed by: PHYSICIAN ASSISTANT

## 2021-01-15 ASSESSMENT — MIFFLIN-ST. JEOR: SCORE: 1362.94

## 2021-01-15 NOTE — PROGRESS NOTES
[facility] [address] [place] [address]  Phone: [phone]  Primary Provider: Tito Salcido  Pre-op Performing Provider: TITO SALCIDO    PREOPERATIVE EVALUATION:  Today's date: 1/15/2021    Lobo Isaacs is a 70 year old male who presents for a preoperative evaluation.    Surgical Information:  Surgery/Procedure: Bladder surgery  Surgery Location: [facility]  Surgeon:   Surgery Date: 01/19/2021  Time of Surgery: 7:30 am  Where patient plans to recover: At home with family  Fax number for surgical facility: Note does not need to be faxed, will be available electronically in Epic.    Type of Anesthesia Anticipated: to be determined    Subjective     HPI related to upcoming procedure:     Lobo is a pleasant 70 year old male who in 2015 had episode of gross hematuria and was ultimately diagnosed with superficial bladder cancer. He denies any recent blood in urine or recent infections. Presenting today for preoperative exam prior to repeat cystoscopy. He has no other concerns and is otherwise feeling well.       Preop Questions 1/15/2021   1. Have you ever had a heart attack or stroke? No   2. Have you ever had surgery on your heart or blood vessels, such as a stent placement, a coronary artery bypass, or surgery on an artery in your head, neck, heart, or legs? No   3. Do you have chest pain with activity? No   4. Do you have a history of  heart failure? No   5. Do you currently have a cold, bronchitis or symptoms of other infection? No   6. Do you have a cough, shortness of breath, or wheezing? No   7. Do you or anyone in your family have previous history of blood clots? No   8. Do you or does anyone in your family have a serious bleeding problem such as prolonged bleeding following surgeries or cuts? No   9. Have you ever had problems with anemia or been told to take iron pills? No   10. Have you had any abnormal blood loss such as black, tarry or bloody  stools? No   11. Have you ever had a blood transfusion? YES - surgery in 2014   11a. Have you ever had a transfusion reaction? No   12. Are you willing to have a blood transfusion if it is medically needed before, during, or after your surgery? Yes   13. Have you or any of your relatives ever had problems with anesthesia? No   14. Do you have sleep apnea, excessive snoring or daytime drowsiness? YES - sleep apnea per wife   14a. Do you have a CPAP machine? No   15. Do you have any artifical heart valves or other implanted medical devices like a pacemaker, defibrillator, or continuous glucose monitor? No   16. Do you have artificial joints? No   17. Are you allergic to latex? No       Health Care Directive:  Patient has a Health Care Directive on file      Preoperative Review of :   reviewed - no record of controlled substances prescribed.        Review of Systems  Constitutional, neuro, ENT, endocrine, pulmonary, cardiac, gastrointestinal, genitourinary, musculoskeletal, integument and psychiatric systems are negative, except as otherwise noted.    Patient Active Problem List    Diagnosis Date Noted     Personal history of malignant neoplasm of bladder 10/13/2020     Priority: Medium     Added automatically from request for surgery [id number]       Hematuria 03/15/2018     Priority: Medium     Urinary retention 03/15/2018     Priority: Medium     Right foot pain 10/06/2016     Priority: Medium     Primary osteoarthritis of right knee 10/06/2016     Priority: Medium     Underweight 10/06/2016     Priority: Medium     Bilateral sensorineural hearing loss 10/06/2016     Priority: Medium     Senile cataract of left eye 10/06/2016     Priority: Medium     Rotator cuff syndrome of left shoulder 08/11/2015     Priority: Medium     ACP (advance care planning) 07/23/2015     Priority: Medium     Advance Care Planning 7/23/2015: Receipt of ACP document:  Received: Health Care Directive which was witnessed or notarized on  7/6/15.  Document previously scanned on 7/14/15.  Validation form completed and sent to be scanned.  Code Status reflects choices in most recent ACP document.  Confirmed/documented designated decision maker(s).  Added by [facility] 03/13/2015     Priority: Medium     Status:  Declined  Care Coordinator:  Fifi Burrows RN CCM    See Letters for [facility] Care Plan  Date:  March 13, 2015         Abscess, bladder 03/11/2015     Priority: Medium     Bladder tumor 03/03/2015     Priority: Medium     Bladder cancer (H) 03/02/2015     Priority: Medium     Primary bladder papillary carcinoma (H) 02/26/2015     Priority: Medium     Hypertension goal BP (blood pressure) < 140/80 02/26/2015     Priority: Medium     Hyperlipidemia with target LDL less than 130 02/11/2015     Priority: Medium     Diagnosis updated by automated process. Provider to review and confirm.       Microscopic hematuria 12/27/2012     Priority: Medium      Past Medical History:   Diagnosis Date     Anemia      Arthritis      Bladder cancer (H)      Hearing loss      History of blood transfusion 3/2015    bladder surgery(2 Units)     Hyperlipidemia      Hypertension      Mumps      Past Surgical History:   Procedure Laterality Date     COLONOSCOPY       CYSTOSCOPY       CYSTOSCOPY, BIOPSY BLADDER, COMBINED N/A 3/8/2018    Procedure: COMBINED CYSTOSCOPY, BIOPSY BLADDER;;  Surgeon: Cody Torres MD;  Location:  OR     CYSTOSCOPY, CYSTOGRAM, COMBINED N/A 3/2/2015    Procedure: COMBINED CYSTOSCOPY, CYSTOGRAM;  Surgeon: Cody Torres MD;  Location:  OR     CYSTOSCOPY, FULGURATE BLEEDERS, EVACUATE CLOT(S), COMBINED N/A 3/2/2015    Procedure: COMBINED CYSTOSCOPY, FULGURATE BLEEDERS, EVACUATE CLOT(S);  Surgeon: Cody Torres MD;  Location:  OR     CYSTOSCOPY, FULGURATE BLEEDERS, EVACUATE CLOT(S), COMBINED N/A 3/15/2018    Procedure: COMBINED CYSTOSCOPY, FULGURATE BLEEDERS, EVACUATE CLOT(S);  CYSTOSCOPY,  FULGURATION OF BLEEDERS.;  Surgeon: Cody Torres MD;  Location:  OR     CYSTOSCOPY, RETROGRADES, COMBINED  3/2/2015    Procedure: COMBINED CYSTOSCOPY, RETROGRADES;  Surgeon: Cody Torres MD;  Location:  OR     CYSTOSCOPY, RETROGRADES, COMBINED  7/7/2015    Procedure: COMBINED CYSTOSCOPY, RETROGRADES;  Surgeon: Cody Torres MD;  Location:  OR     CYSTOSCOPY, RETROGRADES, COMBINED Bilateral 3/8/2018    Procedure: COMBINED CYSTOSCOPY, RETROGRADES;  CYSTOSCOPY, BILATERAL RETROGRADES, TRANSURETHRAL RESECTION OF BLADDER TUMOR, BLADDER BIOPSY;  Surgeon: Cody Torres MD;  Location:  OR     CYSTOSCOPY, TRANSURETHRAL RESECTION (TUR) TUMOR BLADDER, COMBINED N/A 3/2/2015    Procedure: COMBINED CYSTOSCOPY, TRANSURETHRAL RESECTION (TUR) TUMOR BLADDER;  Surgeon: Cody Torres MD;  Location:  OR     CYSTOSCOPY, TRANSURETHRAL RESECTION (TUR) TUMOR BLADDER, COMBINED N/A 7/7/2015    Procedure: COMBINED CYSTOSCOPY, TRANSURETHRAL RESECTION (TUR) TUMOR BLADDER;  Surgeon: Cody Torres MD;  Location:  OR     CYSTOSCOPY, TRANSURETHRAL RESECTION (TUR) TUMOR BLADDER, COMBINED N/A 3/8/2018    Procedure: COMBINED CYSTOSCOPY, TRANSURETHRAL RESECTION (TUR) TUMOR BLADDER;;  Surgeon: Cody Torres MD;  Location:  OR     HERNIORRHAPHY INGUINAL Right 9/4/2015    Procedure: HERNIORRHAPHY INGUINAL;  Surgeon: Levi Warren MD;  Location: [facility]     LAPAROSCOPIC HERNIORRHAPHY INGUINAL Right 9/4/2015    Procedure: LAPAROSCOPIC HERNIORRHAPHY INGUINAL;  Surgeon: Levi Warren MD;  Location: [facility]     Current Outpatient Medications   Medication Sig Dispense Refill     Acetaminophen (TYLENOL PO) Take 500 mg by mouth every 4 hours as needed for mild pain or fever       atorvastatin (LIPITOR) 10 MG tablet Take 1 tablet (10 mg) by mouth daily 90 tablet 3     metoprolol succinate ER (TOPROL-XL) 50 MG 24 hr tablet Take 1 tablet (50 mg) by mouth daily 90 tablet 3  "      No Known Allergies     Social History     Tobacco Use     Smoking status: Former Smoker     Smokeless tobacco: Never Used     Tobacco comment: SMOKED BRIEFLY IN HIS 20'S   Substance Use Topics     Alcohol use: No       History   Drug Use No         Objective     BP (!) 158/90 (BP Location: Left arm, Patient Position: Sitting, Cuff Size: Adult Regular)   Pulse 84   Temp 96.9  F (36.1  C) (Temporal)   Resp 16   Ht 1.74 m (5' 8.5\")   Wt 63 kg (139 lb)   SpO2 98%   BMI 20.83 kg/m      Physical Exam    GENERAL APPEARANCE: healthy, alert and no distress     EYES: EOMI,  PERRL     HENT: ear canals and TM's normal and nose and mouth without ulcers or lesions     NECK: no adenopathy, no asymmetry, masses, or scars and thyroid normal to palpation     RESP: lungs clear to auscultation - no rales, rhonchi or wheezes     CV: regular rates and rhythm, normal S1 S2, no S3 or S4 and no murmur, click or rub     ABDOMEN:  soft, nontender, no HSM or masses and bowel sounds normal     MS: extremities normal- no gross deformities noted, no evidence of inflammation in joints, FROM in all extremities.     SKIN: no suspicious lesions or rashes     NEURO: Normal strength and tone, sensory exam grossly normal, mentation intact and speech normal     PSYCH: mentation appears normal. and affect normal/bright     LYMPHATICS: No cervical adenopathy    Recent Labs   Lab Test 03/03/20  0804 02/19/19  0911   HGB 13.4 13.5    210    134   POTASSIUM 4.4 4.5   CR 0.82 0.90        Diagnostics:  Recent Results (from the past 168 hour(s))   CBC with platelets and differential    Collection Time: 01/15/21  8:53 AM   Result Value Ref Range    WBC 6.0 4.0 - 11.0 10e9/L    RBC Count 4.43 4.4 - 5.9 10e12/L    Hemoglobin 13.2 (L) 13.3 - 17.7 g/dL    Hematocrit 40.5 40.0 - 53.0 %    MCV 91 78 - 100 fl    MCH 29.8 26.5 - 33.0 pg    MCHC 32.6 31.5 - 36.5 g/dL    RDW 12.4 10.0 - 15.0 %    Platelet Count 205 150 - 450 10e9/L    Diff Method " Automated Method     % Neutrophils 58.2 %    % Lymphocytes 29.9 %    % Monocytes 8.4 %    % Eosinophils 3.2 %    % Basophils 0.3 %    Absolute Neutrophil 3.5 1.6 - 8.3 10e9/L    Absolute Lymphocytes 1.8 0.8 - 5.3 10e9/L    Absolute Monocytes 0.5 0.0 - 1.3 10e9/L    Absolute Eosinophils 0.2 0.0 - 0.7 10e9/L    Absolute Basophils 0.0 0.0 - 0.2 10e9/L   Comprehensive metabolic panel (BMP + Alb, Alk Phos, ALT, AST, Total. Bili, TP)    Collection Time: 01/15/21  8:53 AM   Result Value Ref Range    Sodium 137 133 - 144 mmol/L    Potassium 4.1 3.4 - 5.3 mmol/L    Chloride 104 94 - 109 mmol/L    Carbon Dioxide 30 20 - 32 mmol/L    Anion Gap 3 3 - 14 mmol/L    Glucose 106 (H) 70 - 99 mg/dL    Urea Nitrogen 27 7 - 30 mg/dL    Creatinine 0.87 0.66 - 1.25 mg/dL    GFR Estimate 86 >60 mL/min/[1.73_m2]    GFR Estimate If Black >90 >60 mL/min/[1.73_m2]    Calcium 8.6 8.5 - 10.1 mg/dL    Bilirubin Total 0.9 0.2 - 1.3 mg/dL    Albumin 3.7 3.4 - 5.0 g/dL    Protein Total 7.5 6.8 - 8.8 g/dL    Alkaline Phosphatase 91 40 - 150 U/L    ALT 21 0 - 70 U/L    AST 16 0 - 45 U/L      No EKG required, no history of coronary heart disease, significant arrhythmia, peripheral arterial disease or other structural heart disease.    Revised Cardiac Risk Index (RCRI):  The patient has the following serious cardiovascular risks for perioperative complications:   - No serious cardiac risks = 0 points     RCRI Interpretation: 0 points: Class I (very low risk - 0.4% complication rate)           Assessment & Plan   The proposed surgical procedure is considered LOW risk.    Problem List Items Addressed This Visit        Other    Personal history of malignant neoplasm of bladder      Other Visit Diagnoses     Preop general physical exam    -  Primary    Relevant Orders    CBC with platelets and differential (Completed)    Comprehensive metabolic panel (BMP + Alb, Alk Phos, ALT, AST, Total. Bili, TP) (Completed)    Elevated blood pressure reading without  diagnosis of hypertension               Risks and Recommendations:  The patient has the following additional risks and recommendations for perioperative complications:   - No identified additional risk factors other than previously addressed    Medication Instructions:  Patient is to take all scheduled medications on the day of surgery    RECOMMENDATION:  APPROVAL GIVEN to proceed with proposed procedure, without further diagnostic evaluation.    Signed Electronically by: Tito Bray PA-C    Copy of this evaluation report is provided to requesting physician.    Preop [facility] Preop Guidelines    Revised Cardiac Risk Index

## 2021-01-15 NOTE — LETTER
January 18, 2021      Lobo RUIZ Ferny  [address]  [facility] [address]        Dear ,    We are writing to inform you of your test results.    Your lab work returned within normal limits.   Best wishes with the upcoming procedure.     Resulted Orders   CBC with platelets and differential   Result Value Ref Range    WBC 6.0 4.0 - 11.0 10e9/L    RBC Count 4.43 4.4 - 5.9 10e12/L    Hemoglobin 13.2 (L) 13.3 - 17.7 g/dL    Hematocrit 40.5 40.0 - 53.0 %    MCV 91 78 - 100 fl    MCH 29.8 26.5 - 33.0 pg    MCHC 32.6 31.5 - 36.5 g/dL    RDW 12.4 10.0 - 15.0 %    Platelet Count 205 150 - 450 10e9/L    Diff Method Automated Method     % Neutrophils 58.2 %    % Lymphocytes 29.9 %    % Monocytes 8.4 %    % Eosinophils 3.2 %    % Basophils 0.3 %    Absolute Neutrophil 3.5 1.6 - 8.3 10e9/L    Absolute Lymphocytes 1.8 0.8 - 5.3 10e9/L    Absolute Monocytes 0.5 0.0 - 1.3 10e9/L    Absolute Eosinophils 0.2 0.0 - 0.7 10e9/L    Absolute Basophils 0.0 0.0 - 0.2 10e9/L   Comprehensive metabolic panel (BMP + Alb, Alk Phos, ALT, AST, Total. Bili, TP)   Result Value Ref Range    Sodium 137 133 - 144 mmol/L    Potassium 4.1 3.4 - 5.3 mmol/L    Chloride 104 94 - 109 mmol/L    Carbon Dioxide 30 20 - 32 mmol/L    Anion Gap 3 3 - 14 mmol/L    Glucose 106 (H) 70 - 99 mg/dL    Urea Nitrogen 27 7 - 30 mg/dL    Creatinine 0.87 0.66 - 1.25 mg/dL    GFR Estimate 86 >60 mL/min/[1.73_m2]      Comment:      Non  GFR Calc  Starting 12/18/2018, serum creatinine based estimated GFR (eGFR) will be   calculated using the Chronic Kidney Disease Epidemiology Collaboration   (CKD-EPI) equation.      GFR Estimate If Black >90 >60 mL/min/[1.73_m2]      Comment:       GFR Calc  Starting 12/18/2018, serum creatinine based estimated GFR (eGFR) will be   calculated using the Chronic Kidney Disease Epidemiology Collaboration   (CKD-EPI) equation.      Calcium 8.6 8.5 - 10.1 mg/dL    Bilirubin Total 0.9 0.2 - 1.3 mg/dL     Albumin 3.7 3.4 - 5.0 g/dL    Protein Total 7.5 6.8 - 8.8 g/dL    Alkaline Phosphatase 91 40 - 150 U/L    ALT 21 0 - 70 U/L    AST 16 0 - 45 U/L       If you have any questions or concerns, please call the clinic at the number listed above.       Sincerely,      Tito Bray PA-C/nr

## 2021-01-15 NOTE — H&P (VIEW-ONLY)
[facility] [address] [place] [address]  Phone: [phone]  Primary Provider: Tito Salcido  Pre-op Performing Provider: TITO SALCIDO    PREOPERATIVE EVALUATION:  Today's date: 1/15/2021    Lobo Isaacs is a 70 year old male who presents for a preoperative evaluation.    Surgical Information:  Surgery/Procedure: Bladder surgery  Surgery Location: [facility]  Surgeon:   Surgery Date: 01/19/2021  Time of Surgery: 7:30 am  Where patient plans to recover: At home with family  Fax number for surgical facility: Note does not need to be faxed, will be available electronically in Epic.    Type of Anesthesia Anticipated: to be determined    Subjective     HPI related to upcoming procedure:     Lobo is a pleasant 70 year old male who in 2015 had episode of gross hematuria and was ultimately diagnosed with superficial bladder cancer. He denies any recent blood in urine or recent infections. Presenting today for preoperative exam prior to repeat cystoscopy. He has no other concerns and is otherwise feeling well.       Preop Questions 1/15/2021   1. Have you ever had a heart attack or stroke? No   2. Have you ever had surgery on your heart or blood vessels, such as a stent placement, a coronary artery bypass, or surgery on an artery in your head, neck, heart, or legs? No   3. Do you have chest pain with activity? No   4. Do you have a history of  heart failure? No   5. Do you currently have a cold, bronchitis or symptoms of other infection? No   6. Do you have a cough, shortness of breath, or wheezing? No   7. Do you or anyone in your family have previous history of blood clots? No   8. Do you or does anyone in your family have a serious bleeding problem such as prolonged bleeding following surgeries or cuts? No   9. Have you ever had problems with anemia or been told to take iron pills? No   10. Have you had any abnormal blood loss such as black, tarry or bloody  stools? No   11. Have you ever had a blood transfusion? YES - surgery in 2014   11a. Have you ever had a transfusion reaction? No   12. Are you willing to have a blood transfusion if it is medically needed before, during, or after your surgery? Yes   13. Have you or any of your relatives ever had problems with anesthesia? No   14. Do you have sleep apnea, excessive snoring or daytime drowsiness? YES - sleep apnea per wife   14a. Do you have a CPAP machine? No   15. Do you have any artifical heart valves or other implanted medical devices like a pacemaker, defibrillator, or continuous glucose monitor? No   16. Do you have artificial joints? No   17. Are you allergic to latex? No       Health Care Directive:  Patient has a Health Care Directive on file      Preoperative Review of :   reviewed - no record of controlled substances prescribed.        Review of Systems  Constitutional, neuro, ENT, endocrine, pulmonary, cardiac, gastrointestinal, genitourinary, musculoskeletal, integument and psychiatric systems are negative, except as otherwise noted.    Patient Active Problem List    Diagnosis Date Noted     Personal history of malignant neoplasm of bladder 10/13/2020     Priority: Medium     Added automatically from request for surgery [id number]       Hematuria 03/15/2018     Priority: Medium     Urinary retention 03/15/2018     Priority: Medium     Right foot pain 10/06/2016     Priority: Medium     Primary osteoarthritis of right knee 10/06/2016     Priority: Medium     Underweight 10/06/2016     Priority: Medium     Bilateral sensorineural hearing loss 10/06/2016     Priority: Medium     Senile cataract of left eye 10/06/2016     Priority: Medium     Rotator cuff syndrome of left shoulder 08/11/2015     Priority: Medium     ACP (advance care planning) 07/23/2015     Priority: Medium     Advance Care Planning 7/23/2015: Receipt of ACP document:  Received: Health Care Directive which was witnessed or notarized on  7/6/15.  Document previously scanned on 7/14/15.  Validation form completed and sent to be scanned.  Code Status reflects choices in most recent ACP document.  Confirmed/documented designated decision maker(s).  Added by [facility] 03/13/2015     Priority: Medium     Status:  Declined  Care Coordinator:  Fifi Burrows RN CCM    See Letters for [facility]  Date:  March 13, 2015         Abscess, bladder 03/11/2015     Priority: Medium     Bladder tumor 03/03/2015     Priority: Medium     Bladder cancer (H) 03/02/2015     Priority: Medium     Primary bladder papillary carcinoma (H) 02/26/2015     Priority: Medium     Hypertension goal BP (blood pressure) < 140/80 02/26/2015     Priority: Medium     Hyperlipidemia with target LDL less than 130 02/11/2015     Priority: Medium     Diagnosis updated by automated process. Provider to review and confirm.       Microscopic hematuria 12/27/2012     Priority: Medium      Past Medical History:   Diagnosis Date     Anemia      Arthritis      Bladder cancer (H)      Hearing loss      History of blood transfusion 3/2015    bladder surgery(2 Units)     Hyperlipidemia      Hypertension      Mumps      Past Surgical History:   Procedure Laterality Date     COLONOSCOPY       CYSTOSCOPY       CYSTOSCOPY, BIOPSY BLADDER, COMBINED N/A 3/8/2018    Procedure: COMBINED CYSTOSCOPY, BIOPSY BLADDER;;  Surgeon: Cody Torres MD;  Location:  OR     CYSTOSCOPY, CYSTOGRAM, COMBINED N/A 3/2/2015    Procedure: COMBINED CYSTOSCOPY, CYSTOGRAM;  Surgeon: Cody Torres MD;  Location:  OR     CYSTOSCOPY, FULGURATE BLEEDERS, EVACUATE CLOT(S), COMBINED N/A 3/2/2015    Procedure: COMBINED CYSTOSCOPY, FULGURATE BLEEDERS, EVACUATE CLOT(S);  Surgeon: Cody Torres MD;  Location:  OR     CYSTOSCOPY, FULGURATE BLEEDERS, EVACUATE CLOT(S), COMBINED N/A 3/15/2018    Procedure: COMBINED CYSTOSCOPY, FULGURATE BLEEDERS, EVACUATE CLOT(S);  CYSTOSCOPY,  FULGURATION OF BLEEDERS.;  Surgeon: Cody Torres MD;  Location:  OR     CYSTOSCOPY, RETROGRADES, COMBINED  3/2/2015    Procedure: COMBINED CYSTOSCOPY, RETROGRADES;  Surgeon: Cody Torres MD;  Location:  OR     CYSTOSCOPY, RETROGRADES, COMBINED  7/7/2015    Procedure: COMBINED CYSTOSCOPY, RETROGRADES;  Surgeon: Cody Torres MD;  Location:  OR     CYSTOSCOPY, RETROGRADES, COMBINED Bilateral 3/8/2018    Procedure: COMBINED CYSTOSCOPY, RETROGRADES;  CYSTOSCOPY, BILATERAL RETROGRADES, TRANSURETHRAL RESECTION OF BLADDER TUMOR, BLADDER BIOPSY;  Surgeon: Cody Torres MD;  Location:  OR     CYSTOSCOPY, TRANSURETHRAL RESECTION (TUR) TUMOR BLADDER, COMBINED N/A 3/2/2015    Procedure: COMBINED CYSTOSCOPY, TRANSURETHRAL RESECTION (TUR) TUMOR BLADDER;  Surgeon: Cody Torres MD;  Location:  OR     CYSTOSCOPY, TRANSURETHRAL RESECTION (TUR) TUMOR BLADDER, COMBINED N/A 7/7/2015    Procedure: COMBINED CYSTOSCOPY, TRANSURETHRAL RESECTION (TUR) TUMOR BLADDER;  Surgeon: Cody Torres MD;  Location:  OR     CYSTOSCOPY, TRANSURETHRAL RESECTION (TUR) TUMOR BLADDER, COMBINED N/A 3/8/2018    Procedure: COMBINED CYSTOSCOPY, TRANSURETHRAL RESECTION (TUR) TUMOR BLADDER;;  Surgeon: Cody Torres MD;  Location:  OR     HERNIORRHAPHY INGUINAL Right 9/4/2015    Procedure: HERNIORRHAPHY INGUINAL;  Surgeon: Levi Warren MD;  Location: [facility]     LAPAROSCOPIC HERNIORRHAPHY INGUINAL Right 9/4/2015    Procedure: LAPAROSCOPIC HERNIORRHAPHY INGUINAL;  Surgeon: Levi Warren MD;  Location: [facility]     Current Outpatient Medications   Medication Sig Dispense Refill     Acetaminophen (TYLENOL PO) Take 500 mg by mouth every 4 hours as needed for mild pain or fever       atorvastatin (LIPITOR) 10 MG tablet Take 1 tablet (10 mg) by mouth daily 90 tablet 3     metoprolol succinate ER (TOPROL-XL) 50 MG 24 hr tablet Take 1 tablet (50 mg) by mouth daily 90 tablet 3  "      No Known Allergies     Social History     Tobacco Use     Smoking status: Former Smoker     Smokeless tobacco: Never Used     Tobacco comment: SMOKED BRIEFLY IN HIS 20'S   Substance Use Topics     Alcohol use: No       History   Drug Use No         Objective     BP (!) 158/90 (BP Location: Left arm, Patient Position: Sitting, Cuff Size: Adult Regular)   Pulse 84   Temp 96.9  F (36.1  C) (Temporal)   Resp 16   Ht 1.74 m (5' 8.5\")   Wt 63 kg (139 lb)   SpO2 98%   BMI 20.83 kg/m      Physical Exam    GENERAL APPEARANCE: healthy, alert and no distress     EYES: EOMI,  PERRL     HENT: ear canals and TM's normal and nose and mouth without ulcers or lesions     NECK: no adenopathy, no asymmetry, masses, or scars and thyroid normal to palpation     RESP: lungs clear to auscultation - no rales, rhonchi or wheezes     CV: regular rates and rhythm, normal S1 S2, no S3 or S4 and no murmur, click or rub     ABDOMEN:  soft, nontender, no HSM or masses and bowel sounds normal     MS: extremities normal- no gross deformities noted, no evidence of inflammation in joints, FROM in all extremities.     SKIN: no suspicious lesions or rashes     NEURO: Normal strength and tone, sensory exam grossly normal, mentation intact and speech normal     PSYCH: mentation appears normal. and affect normal/bright     LYMPHATICS: No cervical adenopathy    Recent Labs   Lab Test 03/03/20  0804 02/19/19  0911   HGB 13.4 13.5    210    134   POTASSIUM 4.4 4.5   CR 0.82 0.90        Diagnostics:  Recent Results (from the past 168 hour(s))   CBC with platelets and differential    Collection Time: 01/15/21  8:53 AM   Result Value Ref Range    WBC 6.0 4.0 - 11.0 10e9/L    RBC Count 4.43 4.4 - 5.9 10e12/L    Hemoglobin 13.2 (L) 13.3 - 17.7 g/dL    Hematocrit 40.5 40.0 - 53.0 %    MCV 91 78 - 100 fl    MCH 29.8 26.5 - 33.0 pg    MCHC 32.6 31.5 - 36.5 g/dL    RDW 12.4 10.0 - 15.0 %    Platelet Count 205 150 - 450 10e9/L    Diff Method " Automated Method     % Neutrophils 58.2 %    % Lymphocytes 29.9 %    % Monocytes 8.4 %    % Eosinophils 3.2 %    % Basophils 0.3 %    Absolute Neutrophil 3.5 1.6 - 8.3 10e9/L    Absolute Lymphocytes 1.8 0.8 - 5.3 10e9/L    Absolute Monocytes 0.5 0.0 - 1.3 10e9/L    Absolute Eosinophils 0.2 0.0 - 0.7 10e9/L    Absolute Basophils 0.0 0.0 - 0.2 10e9/L   Comprehensive metabolic panel (BMP + Alb, Alk Phos, ALT, AST, Total. Bili, TP)    Collection Time: 01/15/21  8:53 AM   Result Value Ref Range    Sodium 137 133 - 144 mmol/L    Potassium 4.1 3.4 - 5.3 mmol/L    Chloride 104 94 - 109 mmol/L    Carbon Dioxide 30 20 - 32 mmol/L    Anion Gap 3 3 - 14 mmol/L    Glucose 106 (H) 70 - 99 mg/dL    Urea Nitrogen 27 7 - 30 mg/dL    Creatinine 0.87 0.66 - 1.25 mg/dL    GFR Estimate 86 >60 mL/min/[1.73_m2]    GFR Estimate If Black >90 >60 mL/min/[1.73_m2]    Calcium 8.6 8.5 - 10.1 mg/dL    Bilirubin Total 0.9 0.2 - 1.3 mg/dL    Albumin 3.7 3.4 - 5.0 g/dL    Protein Total 7.5 6.8 - 8.8 g/dL    Alkaline Phosphatase 91 40 - 150 U/L    ALT 21 0 - 70 U/L    AST 16 0 - 45 U/L      No EKG required, no history of coronary heart disease, significant arrhythmia, peripheral arterial disease or other structural heart disease.    Revised Cardiac Risk Index (RCRI):  The patient has the following serious cardiovascular risks for perioperative complications:   - No serious cardiac risks = 0 points     RCRI Interpretation: 0 points: Class I (very low risk - 0.4% complication rate)           Assessment & Plan   The proposed surgical procedure is considered LOW risk.    Problem List Items Addressed This Visit        Other    Personal history of malignant neoplasm of bladder      Other Visit Diagnoses     Preop general physical exam    -  Primary    Relevant Orders    CBC with platelets and differential (Completed)    Comprehensive metabolic panel (BMP + Alb, Alk Phos, ALT, AST, Total. Bili, TP) (Completed)    Elevated blood pressure reading without  diagnosis of hypertension               Risks and Recommendations:  The patient has the following additional risks and recommendations for perioperative complications:   - No identified additional risk factors other than previously addressed    Medication Instructions:  Patient is to take all scheduled medications on the day of surgery    RECOMMENDATION:  APPROVAL GIVEN to proceed with proposed procedure, without further diagnostic evaluation.    Signed Electronically by: Tito Bray PA-C    Copy of this evaluation report is provided to requesting physician.    Preop [facility] Preop Guidelines    Revised Cardiac Risk Index

## 2021-01-15 NOTE — PATIENT INSTRUCTIONS

## 2021-01-18 LAB
LABORATORY COMMENT REPORT: NORMAL
SARS-COV-2 RNA RESP QL NAA+PROBE: NEGATIVE
SARS-COV-2 RNA RESP QL NAA+PROBE: NORMAL
SPECIMEN SOURCE: NORMAL
SPECIMEN SOURCE: NORMAL

## 2021-01-19 ENCOUNTER — ANESTHESIA (OUTPATIENT)
Dept: SURGERY | Facility: CLINIC | Age: 71
End: 2021-01-19
Payer: COMMERCIAL

## 2021-01-19 ENCOUNTER — APPOINTMENT (OUTPATIENT)
Dept: GENERAL RADIOLOGY | Facility: CLINIC | Age: 71
End: 2021-01-19
Attending: UROLOGY
Payer: COMMERCIAL

## 2021-01-19 ENCOUNTER — HOSPITAL ENCOUNTER (OUTPATIENT)
Facility: CLINIC | Age: 71
Discharge: HOME | End: 2021-01-19
Attending: UROLOGY | Admitting: UROLOGY
Payer: COMMERCIAL

## 2021-01-19 ENCOUNTER — ANESTHESIA EVENT (OUTPATIENT)
Dept: SURGERY | Facility: CLINIC | Age: 71
End: 2021-01-19
Payer: COMMERCIAL

## 2021-01-19 VITALS
TEMPERATURE: 97.7 F | HEART RATE: 65 BPM | SYSTOLIC BLOOD PRESSURE: 144 MMHG | WEIGHT: 140 LBS | BODY MASS INDEX: 20.73 KG/M2 | DIASTOLIC BLOOD PRESSURE: 88 MMHG | OXYGEN SATURATION: 99 % | RESPIRATION RATE: 12 BRPM | HEIGHT: 69 IN

## 2021-01-19 DIAGNOSIS — Z85.51 PERSONAL HISTORY OF MALIGNANT NEOPLASM OF BLADDER: Primary | ICD-10-CM

## 2021-01-19 PROCEDURE — 250N000025 HC SEVOFLURANE, PER MIN: Performed by: UROLOGY

## 2021-01-19 PROCEDURE — 999N000179 XR SURGERY CARM FLUORO LESS THAN 5 MIN W STILLS: Mod: TC

## 2021-01-19 PROCEDURE — 250N000011 HC RX IP 250 OP 636: Performed by: NURSE ANESTHETIST, CERTIFIED REGISTERED

## 2021-01-19 PROCEDURE — 255N000002 HC RX 255 OP 636: Performed by: UROLOGY

## 2021-01-19 PROCEDURE — 258N000003 HC RX IP 258 OP 636: Performed by: NURSE ANESTHETIST, CERTIFIED REGISTERED

## 2021-01-19 PROCEDURE — 370N000017 HC ANESTHESIA TECHNICAL FEE, PER MIN: Performed by: UROLOGY

## 2021-01-19 PROCEDURE — 999N000141 HC STATISTIC PRE-PROCEDURE NURSING ASSESSMENT: Performed by: UROLOGY

## 2021-01-19 PROCEDURE — 88305 TISSUE EXAM BY PATHOLOGIST: CPT | Mod: 26 | Performed by: PATHOLOGY

## 2021-01-19 PROCEDURE — 88305 TISSUE EXAM BY PATHOLOGIST: CPT | Mod: TC | Performed by: UROLOGY

## 2021-01-19 PROCEDURE — 360N000075 HC SURGERY LEVEL 2, PER MIN: Performed by: UROLOGY

## 2021-01-19 PROCEDURE — 710N000009 HC RECOVERY PHASE 1, LEVEL 1, PER MIN: Performed by: UROLOGY

## 2021-01-19 PROCEDURE — 250N000011 HC RX IP 250 OP 636: Performed by: UROLOGY

## 2021-01-19 PROCEDURE — 272N000001 HC OR GENERAL SUPPLY STERILE: Performed by: UROLOGY

## 2021-01-19 PROCEDURE — 710N000012 HC RECOVERY PHASE 2, PER MINUTE: Performed by: UROLOGY

## 2021-01-19 PROCEDURE — 250N000009 HC RX 250: Performed by: UROLOGY

## 2021-01-19 PROCEDURE — 250N000009 HC RX 250: Performed by: NURSE ANESTHETIST, CERTIFIED REGISTERED

## 2021-01-19 RX ORDER — CEFAZOLIN SODIUM 1 G/3ML
1 INJECTION, POWDER, FOR SOLUTION INTRAMUSCULAR; INTRAVENOUS SEE ADMIN INSTRUCTIONS
Status: DISCONTINUED | OUTPATIENT
Start: 2021-01-19 | End: 2021-01-19 | Stop reason: HOSPADM

## 2021-01-19 RX ORDER — FENTANYL CITRATE 50 UG/ML
INJECTION, SOLUTION INTRAMUSCULAR; INTRAVENOUS PRN
Status: DISCONTINUED | OUTPATIENT
Start: 2021-01-19 | End: 2021-01-19

## 2021-01-19 RX ORDER — DEXAMETHASONE SODIUM PHOSPHATE 4 MG/ML
INJECTION, SOLUTION INTRA-ARTICULAR; INTRALESIONAL; INTRAMUSCULAR; INTRAVENOUS; SOFT TISSUE PRN
Status: DISCONTINUED | OUTPATIENT
Start: 2021-01-19 | End: 2021-01-19

## 2021-01-19 RX ORDER — ONDANSETRON 4 MG/1
4 TABLET, ORALLY DISINTEGRATING ORAL EVERY 30 MIN PRN
Status: DISCONTINUED | OUTPATIENT
Start: 2021-01-19 | End: 2021-01-19 | Stop reason: HOSPADM

## 2021-01-19 RX ORDER — PROPOFOL 10 MG/ML
INJECTION, EMULSION INTRAVENOUS PRN
Status: DISCONTINUED | OUTPATIENT
Start: 2021-01-19 | End: 2021-01-19

## 2021-01-19 RX ORDER — HYDRALAZINE HYDROCHLORIDE 20 MG/ML
2.5-5 INJECTION INTRAMUSCULAR; INTRAVENOUS EVERY 10 MIN PRN
Status: DISCONTINUED | OUTPATIENT
Start: 2021-01-19 | End: 2021-01-19 | Stop reason: HOSPADM

## 2021-01-19 RX ORDER — NALOXONE HYDROCHLORIDE 0.4 MG/ML
0.2 INJECTION, SOLUTION INTRAMUSCULAR; INTRAVENOUS; SUBCUTANEOUS
Status: DISCONTINUED | OUTPATIENT
Start: 2021-01-19 | End: 2021-01-19 | Stop reason: HOSPADM

## 2021-01-19 RX ORDER — LIDOCAINE HYDROCHLORIDE 20 MG/ML
INJECTION, SOLUTION INFILTRATION; PERINEURAL PRN
Status: DISCONTINUED | OUTPATIENT
Start: 2021-01-19 | End: 2021-01-19

## 2021-01-19 RX ORDER — GLYCOPYRROLATE 0.2 MG/ML
INJECTION, SOLUTION INTRAMUSCULAR; INTRAVENOUS PRN
Status: DISCONTINUED | OUTPATIENT
Start: 2021-01-19 | End: 2021-01-19

## 2021-01-19 RX ORDER — ONDANSETRON 2 MG/ML
4 INJECTION INTRAMUSCULAR; INTRAVENOUS EVERY 30 MIN PRN
Status: DISCONTINUED | OUTPATIENT
Start: 2021-01-19 | End: 2021-01-19 | Stop reason: HOSPADM

## 2021-01-19 RX ORDER — HYDROMORPHONE HYDROCHLORIDE 1 MG/ML
.3-.5 INJECTION, SOLUTION INTRAMUSCULAR; INTRAVENOUS; SUBCUTANEOUS EVERY 10 MIN PRN
Status: DISCONTINUED | OUTPATIENT
Start: 2021-01-19 | End: 2021-01-19 | Stop reason: HOSPADM

## 2021-01-19 RX ORDER — IOPAMIDOL 612 MG/ML
INJECTION, SOLUTION INTRAVASCULAR PRN
Status: DISCONTINUED | OUTPATIENT
Start: 2021-01-19 | End: 2021-01-19 | Stop reason: HOSPADM

## 2021-01-19 RX ORDER — CEFAZOLIN SODIUM 2 G/100ML
2 INJECTION, SOLUTION INTRAVENOUS
Status: COMPLETED | OUTPATIENT
Start: 2021-01-19 | End: 2021-01-19

## 2021-01-19 RX ORDER — EPHEDRINE SULFATE 50 MG/ML
INJECTION, SOLUTION INTRAMUSCULAR; INTRAVENOUS; SUBCUTANEOUS PRN
Status: DISCONTINUED | OUTPATIENT
Start: 2021-01-19 | End: 2021-01-19

## 2021-01-19 RX ORDER — FENTANYL CITRATE 50 UG/ML
25-50 INJECTION, SOLUTION INTRAMUSCULAR; INTRAVENOUS
Status: DISCONTINUED | OUTPATIENT
Start: 2021-01-19 | End: 2021-01-19 | Stop reason: HOSPADM

## 2021-01-19 RX ORDER — SODIUM CHLORIDE, SODIUM LACTATE, POTASSIUM CHLORIDE, CALCIUM CHLORIDE 600; 310; 30; 20 MG/100ML; MG/100ML; MG/100ML; MG/100ML
INJECTION, SOLUTION INTRAVENOUS CONTINUOUS
Status: DISCONTINUED | OUTPATIENT
Start: 2021-01-19 | End: 2021-01-19 | Stop reason: HOSPADM

## 2021-01-19 RX ORDER — ONDANSETRON 2 MG/ML
INJECTION INTRAMUSCULAR; INTRAVENOUS PRN
Status: DISCONTINUED | OUTPATIENT
Start: 2021-01-19 | End: 2021-01-19

## 2021-01-19 RX ORDER — CIPROFLOXACIN 250 MG/1
250 TABLET, FILM COATED ORAL 2 TIMES DAILY
Qty: 2 TABLET | Refills: 0 | Status: SHIPPED | OUTPATIENT
Start: 2021-01-19 | End: 2021-01-20

## 2021-01-19 RX ORDER — NALOXONE HYDROCHLORIDE 0.4 MG/ML
0.4 INJECTION, SOLUTION INTRAMUSCULAR; INTRAVENOUS; SUBCUTANEOUS
Status: DISCONTINUED | OUTPATIENT
Start: 2021-01-19 | End: 2021-01-19 | Stop reason: HOSPADM

## 2021-01-19 RX ORDER — MEPERIDINE HYDROCHLORIDE 25 MG/ML
12.5 INJECTION INTRAMUSCULAR; INTRAVENOUS; SUBCUTANEOUS
Status: DISCONTINUED | OUTPATIENT
Start: 2021-01-19 | End: 2021-01-19 | Stop reason: HOSPADM

## 2021-01-19 RX ORDER — SODIUM CHLORIDE, SODIUM LACTATE, POTASSIUM CHLORIDE, CALCIUM CHLORIDE 600; 310; 30; 20 MG/100ML; MG/100ML; MG/100ML; MG/100ML
INJECTION, SOLUTION INTRAVENOUS CONTINUOUS PRN
Status: DISCONTINUED | OUTPATIENT
Start: 2021-01-19 | End: 2021-01-19

## 2021-01-19 RX ADMIN — PHENYLEPHRINE HYDROCHLORIDE 100 MCG: 10 INJECTION INTRAVENOUS at 07:48

## 2021-01-19 RX ADMIN — FENTANYL CITRATE 25 MCG: 50 INJECTION, SOLUTION INTRAMUSCULAR; INTRAVENOUS at 07:36

## 2021-01-19 RX ADMIN — PHENYLEPHRINE HYDROCHLORIDE 100 MCG: 10 INJECTION INTRAVENOUS at 07:41

## 2021-01-19 RX ADMIN — PROPOFOL 30 MG: 10 INJECTION, EMULSION INTRAVENOUS at 07:54

## 2021-01-19 RX ADMIN — Medication 5 MG: at 07:48

## 2021-01-19 RX ADMIN — LIDOCAINE HYDROCHLORIDE 60 MG: 20 INJECTION, SOLUTION INFILTRATION; PERINEURAL at 07:36

## 2021-01-19 RX ADMIN — CEFAZOLIN SODIUM 2 G: 2 INJECTION, SOLUTION INTRAVENOUS at 07:41

## 2021-01-19 RX ADMIN — ONDANSETRON 4 MG: 2 INJECTION INTRAMUSCULAR; INTRAVENOUS at 07:42

## 2021-01-19 RX ADMIN — MIDAZOLAM 1 MG: 1 INJECTION INTRAMUSCULAR; INTRAVENOUS at 07:32

## 2021-01-19 RX ADMIN — DEXAMETHASONE SODIUM PHOSPHATE 4 MG: 4 INJECTION, SOLUTION INTRA-ARTICULAR; INTRALESIONAL; INTRAMUSCULAR; INTRAVENOUS; SOFT TISSUE at 07:42

## 2021-01-19 RX ADMIN — Medication 5 MG: at 07:46

## 2021-01-19 RX ADMIN — FENTANYL CITRATE 25 MCG: 50 INJECTION, SOLUTION INTRAMUSCULAR; INTRAVENOUS at 07:53

## 2021-01-19 RX ADMIN — GLYCOPYRROLATE 0.2 MG: 0.2 INJECTION, SOLUTION INTRAMUSCULAR; INTRAVENOUS at 07:50

## 2021-01-19 RX ADMIN — PROPOFOL 120 MG: 10 INJECTION, EMULSION INTRAVENOUS at 07:36

## 2021-01-19 RX ADMIN — SODIUM CHLORIDE, POTASSIUM CHLORIDE, SODIUM LACTATE AND CALCIUM CHLORIDE: 600; 310; 30; 20 INJECTION, SOLUTION INTRAVENOUS at 07:29

## 2021-01-19 ASSESSMENT — MIFFLIN-ST. JEOR: SCORE: 1367.48

## 2021-01-19 NOTE — ANESTHESIA CARE TRANSFER NOTE
Patient: Lobo Isaacs    Procedure(s):  CYSTOSCOPY, WITH BLADDER BIOPSY WITH FULGERATION, BILATERAL RETROGRADE PYELOGRAMS, CATHETER INSERTION  CYSTOSCOPY, WITH BLADDER BIOPSY    Diagnosis: Personal history of malignant neoplasm of bladder [Z85.51]  Diagnosis Additional Information: No value filed.    Anesthesia Type:   General     Note:    Oropharynx: oropharynx clear of all foreign objects  Level of Consciousness: awake  Oxygen Supplementation: face mask  Level of Supplemental Oxygen: 6  Independent Airway: airway patency satisfactory and stable  Dentition: dentition unchanged  Vital Signs Stable: post-procedure vital signs reviewed and stable  Report to RN Given: handoff report given  Patient transferred to: PACU  Comments: At end of procedure, spontaneous respirations, adequate tidal volumes, followed commands to voice, LMA removed atraumatically, oropharynx suctioned, airway patent after LMA removal. Oxygen via facemask at 6 liters per minute to PACU. Oxygen tubing connected to wall O2 in PACU, SpO2, NiBP, and EKG monitors and alarms on and functioning, Leon Hugger warmer connected to patient gown, report on patient's clinical status given to PACU RN, RN questions answered.  Handoff Report: Identifed the Patient, Identified the Reponsible Provider, Reviewed the pertinent medical history, Discussed the surgical course, Reviewed Intra-OP anesthesia mangement and issues during anesthesia, Set expectations for post-procedure period and Allowed opportunity for questions and acknowledgement of understanding      Vitals: (Last set prior to Anesthesia Care Transfer)  CRNA VITALS  1/19/2021 0755 - 1/19/2021 0829      1/19/2021             Pulse:  69    SpO2:  99 %    Resp Rate (set):  10        Electronically Signed By: DIANE Carlson CRNA  January 19, 2021  8:29 AM

## 2021-01-19 NOTE — ANESTHESIA PREPROCEDURE EVALUATION
Anesthesia Pre-Procedure Evaluation    Patient: Lobo Isaacs   MRN: [id number] : 1950        Preoperative Diagnosis: Personal history of malignant neoplasm of bladder [Z85.51]   Procedure : Procedure(s):  CYSTOSCOPY, WITH BLADDER BIOPSY and bilateral retrograde pyelograms  CYSTOSCOPY, WITH BLADDER BIOPSY     Past Medical History:   Diagnosis Date     Anemia      Arthritis      Bladder cancer (H)      Hearing loss      History of blood transfusion 3/2015    bladder surgery(2 Units)     Hyperlipidemia      Hypertension      Mumps       Past Surgical History:   Procedure Laterality Date     COLONOSCOPY       CYSTOSCOPY       CYSTOSCOPY, BIOPSY BLADDER, COMBINED N/A 3/8/2018    Procedure: COMBINED CYSTOSCOPY, BIOPSY BLADDER;;  Surgeon: Cody Torres MD;  Location:  OR     CYSTOSCOPY, CYSTOGRAM, COMBINED N/A 3/2/2015    Procedure: COMBINED CYSTOSCOPY, CYSTOGRAM;  Surgeon: Cody Torres MD;  Location:  OR     CYSTOSCOPY, FULGURATE BLEEDERS, EVACUATE CLOT(S), COMBINED N/A 3/2/2015    Procedure: COMBINED CYSTOSCOPY, FULGURATE BLEEDERS, EVACUATE CLOT(S);  Surgeon: Cody Torres MD;  Location:  OR     CYSTOSCOPY, FULGURATE BLEEDERS, EVACUATE CLOT(S), COMBINED N/A 3/15/2018    Procedure: COMBINED CYSTOSCOPY, FULGURATE BLEEDERS, EVACUATE CLOT(S);  CYSTOSCOPY, FULGURATION OF BLEEDERS.;  Surgeon: Cody Torres MD;  Location:  OR     CYSTOSCOPY, RETROGRADES, COMBINED  3/2/2015    Procedure: COMBINED CYSTOSCOPY, RETROGRADES;  Surgeon: Cody Torrse MD;  Location:  OR     CYSTOSCOPY, RETROGRADES, COMBINED  2015    Procedure: COMBINED CYSTOSCOPY, RETROGRADES;  Surgeon: Cody Torres MD;  Location:  OR     CYSTOSCOPY, RETROGRADES, COMBINED Bilateral 3/8/2018    Procedure: COMBINED CYSTOSCOPY, RETROGRADES;  CYSTOSCOPY, BILATERAL RETROGRADES, TRANSURETHRAL RESECTION OF BLADDER TUMOR, BLADDER BIOPSY;  Surgeon: Cody Torres MD;  Location:  OR      CYSTOSCOPY, TRANSURETHRAL RESECTION (TUR) TUMOR BLADDER, COMBINED N/A 3/2/2015    Procedure: COMBINED CYSTOSCOPY, TRANSURETHRAL RESECTION (TUR) TUMOR BLADDER;  Surgeon: Cody Torres MD;  Location: [facility]     CYSTOSCOPY, TRANSURETHRAL RESECTION (TUR) TUMOR BLADDER, COMBINED N/A 7/7/2015    Procedure: COMBINED CYSTOSCOPY, TRANSURETHRAL RESECTION (TUR) TUMOR BLADDER;  Surgeon: Cody Torres MD;  Location: [facility]     CYSTOSCOPY, TRANSURETHRAL RESECTION (TUR) TUMOR BLADDER, COMBINED N/A 3/8/2018    Procedure: COMBINED CYSTOSCOPY, TRANSURETHRAL RESECTION (TUR) TUMOR BLADDER;;  Surgeon: Cody Torres MD;  Location:  OR     HERNIORRHAPHY INGUINAL Right 9/4/2015    Procedure: HERNIORRHAPHY INGUINAL;  Surgeon: Levi Warren MD;  [facility]     LAPAROSCOPIC HERNIORRHAPHY INGUINAL Right 9/4/2015    Procedure: LAPAROSCOPIC HERNIORRHAPHY INGUINAL;  Surgeon: Levi Warren MD;  [facility]      No Known Allergies   Social History     Tobacco Use     Smoking status: Former Smoker     Smokeless tobacco: Never Used     Tobacco comment: SMOKED BRIEFLY IN HIS 20'S   Substance Use Topics     Alcohol use: No      Wt Readings from Last 1 Encounters:   01/19/21 63.5 kg (140 lb)        Anesthesia Evaluation            ROS/MED HX  ENT/Pulmonary:     (+) sleep apnea, doesn't use CPAP,     Neurologic:       Cardiovascular:     (+) Dyslipidemia hypertension-----    METS/Exercise Tolerance:     Hematologic:       Musculoskeletal:   (+) arthritis,     GI/Hepatic:     (+) GERD,     Renal/Genitourinary:       Endo:       Psychiatric/Substance Use:       Infectious Disease:       Malignancy:   (+) Malignancy, History of Other.Other CA Bladder cancer; status post.    Other:            Physical Exam    Airway        Mallampati: II   TM distance: > 3 FB   Neck ROM: full   Mouth opening: > 3 cm    Respiratory Devices and Support         Dental  no notable dental history         Cardiovascular    cardiovascular exam normal          Pulmonary   pulmonary exam normal                OUTSIDE LABS:  CBC:   Lab Results   Component Value Date    WBC 6.0 01/15/2021    WBC 5.7 03/03/2020    HGB 13.2 (L) 01/15/2021    HGB 13.4 03/03/2020    HCT 40.5 01/15/2021    HCT 40.3 03/03/2020     01/15/2021     03/03/2020     BMP:   Lab Results   Component Value Date     01/15/2021     03/03/2020    POTASSIUM 4.1 01/15/2021    POTASSIUM 4.4 03/03/2020    CHLORIDE 104 01/15/2021    CHLORIDE 100 03/03/2020    CO2 30 01/15/2021    CO2 28 03/03/2020    BUN 27 01/15/2021    BUN 17 03/03/2020    CR 0.87 01/15/2021    CR 0.82 03/03/2020     (H) 01/15/2021     (H) 03/03/2020     COAGS:   Lab Results   Component Value Date    INR 1.09 03/02/2015    FIBR 301 03/02/2015     POC:   Lab Results   Component Value Date     (H) 03/16/2018     HEPATIC:   Lab Results   Component Value Date    ALBUMIN 3.7 01/15/2021    PROTTOTAL 7.5 01/15/2021    ALT 21 01/15/2021    AST 16 01/15/2021    ALKPHOS 91 01/15/2021    BILITOTAL 0.9 01/15/2021     OTHER:   Lab Results   Component Value Date    KARLA 8.6 01/15/2021    MAG 1.8 03/11/2015    SED 8 03/03/2020       Anesthesia Plan     History & Physical Review      ASA Status:  2. NPO Status:  NPO Appropriate. .  Plan for General with Intravenous induction. Device: LMA Maintenance will be Balanced.         PONV prophylaxis:  Ondansetron (or other 5HT-3).       Consents  Anesthesia Plan(s) and associated risks, benefits, and realistic alternatives discussed.    Questions answered and patient/representative(s) expressed understanding.    Discussed with:  Patient.             Postoperative Care  Postoperative pain management: IV analgesics.           JARED GARCIA MD

## 2021-01-19 NOTE — OP NOTE
Admitted:     01/19/2021      PREOPERATIVE DIAGNOSIS:  History of superficial bladder cancer.      POSTOPERATIVE DIAGNOSIS:  History of superficial bladder cancer.       PROCEDURES PERFORMED:  Cystoscopy, bilateral retrograde pyelograms, bladder biopsies, prostate biopsy, fulguration of bleeders, catheter insertion, fluoroscopic interpretation of images.      SURGEON:  Cody Torres MD      ANESTHESIA:  General.      INDICATIONS FOR PROCEDURE:  Mr. Lobo Isaacs has a history of 6 years ago having a very large bladder tumor, which had to be resected and has since then been followed very closely.  The FISH test has been positive until very recently.  In addition to that, the cytology was atypical.  Because of this, even though the previous cystoscopies in the office had not shown any lesions in the bladder, we decided that we should do bladder biopsies and retrograde pyelograms to be sure there is no evidence of residual carcinoma in situ.        DESCRIPTION OF PROCEDURE:  The patient was brought to the operative suite and after induction of anesthesia, was placed in the dorsal lithotomy position with the genitalia prepped and draped in customary fashion.      A timeout was then called.      A 21-Swedish cystoscope was passed into the penile urethra.  The penile urethra is normal.  The external sphincter was intact.  When viewed from the verumontanum, there is mild enlargement of the lateral lobes of the prostate.  There is no significant median lobe.  The interior of the bladder was carefully inspected.  Grade I trabeculation was identified.  There were some small areas of patchy inflammation, particularly on the left lateral wall and the posterior wall of the bladder.  There was no anjel evidence of neoplasm in the bladder or stone formation.  Using a 6-Swedish open-ended ureteral catheter, which I then inserted into the left ureteric orifice and performed a left retrograde pyelogram with 50% contrast.  This showed a  normal caliber ureter, no hydronephrosis, no filling defects and no other remarkable features.  I then repeated the procedure on the patient's right side on this side also showed a normal caliber ureter, no evidence of filling defects, no hydronephrosis and no other remarkable features.  I then inserted the biopsy instrument after removing the ureteral catheter and took biopsies from the left lateral wall, right lateral wall, posterior wall, anterior wall, mid trigone and prostatic urethra.  I then using a Bugbee electrode carefully coagulated each side until no obvious bleeding could be seen.  After finally inspecting the bladder, I withdrew the cystoscope, inserted an 18-Frisian coude catheter through the urethra into the bladder, inflated the balloon with about 8 mL of fluid and connected it to drainage.  The patient was then taken to the recovery room, having tolerated the procedure well.      CONCLUSION:  We will await the results of the pathology report.  If they are all negative, we will continue surveillance.  If this comes back positive, we may have to have discussions about whether intravesical therapy may be required.         JAGJIT CONTRERAS MD             D: 2021   T: 2021   MT: SUSAN      Name:     NII MARQUIS   MRN:      [id number]        Account:      [id number]   :      1950        Admitted:     2021                   Document: [id number]

## 2021-01-19 NOTE — ANESTHESIA POSTPROCEDURE EVALUATION
Patient: Lobo Isaacs    Procedure(s):  CYSTOSCOPY, WITH BLADDER BIOPSY WITH FULGERATION, BILATERAL RETROGRADE PYELOGRAMS, CATHETER INSERTION  CYSTOSCOPY, WITH BLADDER BIOPSY    Diagnosis:Personal history of malignant neoplasm of bladder [Z85.51]  Diagnosis Additional Information: No value filed.    Anesthesia Type:  General    Note:     Postop Pain Control: Uneventful            Sign Out: Well controlled pain   PONV: No   Neuro/Psych: Uneventful            Sign Out: Acceptable/Baseline neuro status   Airway/Respiratory: Uneventful            Sign Out: Acceptable/Baseline resp. status   CV/Hemodynamics: Uneventful            Sign Out: Acceptable CV status   Other NRE: NONE   DID A NON-ROUTINE EVENT OCCUR? No         Last vitals:  Vitals:    01/19/21 0845 01/19/21 0900 01/19/21 0915   BP: 138/79 (!) 153/89 (!) 144/88   Pulse: 70 63 65   Resp: 11 10 12   Temp:  36.6  C (97.8  F) 36.5  C (97.7  F)   SpO2: 100% 99% 99%       Electronically Signed By: JARED GARCIA MD  January 19, 2021  10:15 AM

## 2021-01-19 NOTE — ANESTHESIA PROCEDURE NOTES
Airway   Date/Time: 1/19/2021 7:38 AM   Patient location during procedure: OR  Staff -   Anesthesiologist:  Venkata Christy MD  CRNA: Luz Byrne APRN CRNA  Performed By: CRNA    Consent for Airway   Urgency: elective    Indications and Patient Condition  Indications for airway management: estrella-procedural  Induction type:intravenousMask difficulty assessment: 1 - vent by mask    Final Airway Details  Final airway type: supraglottic airway    Endotracheal Airway Details   Secured with: silk tape    Post intubation assessment   Placement verified by: capnometry, equal breath sounds and chest rise   Number of attempts at approach: 1  Number of other approaches attempted: 0  Secured with:silk tape  Ease of procedure: easy  Dentition: Intact and Unchanged

## 2021-01-20 LAB — COPATH REPORT: NORMAL

## 2021-03-26 DIAGNOSIS — I10 HYPERTENSION GOAL BP (BLOOD PRESSURE) < 140/80: ICD-10-CM

## 2021-03-29 RX ORDER — METOPROLOL SUCCINATE 50 MG/1
TABLET, EXTENDED RELEASE ORAL
Qty: 90 TABLET | Refills: 3 | Status: SHIPPED | OUTPATIENT
Start: 2021-03-29 | End: 2022-03-25

## 2021-03-29 NOTE — TELEPHONE ENCOUNTER
BP Readings from Last 6 Encounters:   01/19/21 (!) 144/88   01/15/21 (!) 154/82   10/22/20 138/80   09/21/20 128/78   03/18/20 (!) 142/70   03/03/20 130/88

## 2021-04-12 DIAGNOSIS — C67.9 MALIGNANT NEOPLASM OF URINARY BLADDER, UNSPECIFIED SITE (H): Primary | ICD-10-CM

## 2021-04-13 ENCOUNTER — OFFICE VISIT (OUTPATIENT)
Dept: UROLOGY | Facility: CLINIC | Age: 71
End: 2021-04-13
Payer: COMMERCIAL

## 2021-04-13 VITALS
HEART RATE: 69 BPM | OXYGEN SATURATION: 99 % | BODY MASS INDEX: 20.76 KG/M2 | HEIGHT: 68 IN | SYSTOLIC BLOOD PRESSURE: 140 MMHG | WEIGHT: 137 LBS | DIASTOLIC BLOOD PRESSURE: 80 MMHG

## 2021-04-13 DIAGNOSIS — C67.9 MALIGNANT NEOPLASM OF URINARY BLADDER, UNSPECIFIED SITE (H): Primary | ICD-10-CM

## 2021-04-13 LAB
ALBUMIN UR-MCNC: NEGATIVE MG/DL
APPEARANCE UR: CLEAR
BILIRUB UR QL STRIP: NEGATIVE
COLOR UR AUTO: YELLOW
GLUCOSE UR STRIP-MCNC: NEGATIVE MG/DL
HGB UR QL STRIP: ABNORMAL
KETONES UR STRIP-MCNC: NEGATIVE MG/DL
LEUKOCYTE ESTERASE UR QL STRIP: NEGATIVE
NITRATE UR QL: NEGATIVE
PH UR STRIP: 6.5 PH (ref 5–7)
SOURCE: ABNORMAL
SP GR UR STRIP: 1.02 (ref 1–1.03)
UROBILINOGEN UR STRIP-ACNC: 0.2 EU/DL (ref 0.2–1)

## 2021-04-13 PROCEDURE — 81003 URINALYSIS AUTO W/O SCOPE: CPT | Performed by: UROLOGY

## 2021-04-13 PROCEDURE — 52000 CYSTOURETHROSCOPY: CPT | Performed by: UROLOGY

## 2021-04-13 PROCEDURE — 99213 OFFICE O/P EST LOW 20 MIN: CPT | Mod: 25 | Performed by: UROLOGY

## 2021-04-13 PROCEDURE — 88112 CYTOPATH CELL ENHANCE TECH: CPT

## 2021-04-13 RX ORDER — LIDOCAINE HYDROCHLORIDE 20 MG/ML
JELLY TOPICAL ONCE
Status: DISCONTINUED | OUTPATIENT
Start: 2021-04-13 | End: 2021-04-13 | Stop reason: HOSPADM

## 2021-04-13 RX ORDER — CIPROFLOXACIN 500 MG/1
500 TABLET, FILM COATED ORAL ONCE
Qty: 1 TABLET | Refills: 0 | Status: SHIPPED | OUTPATIENT
Start: 2021-04-13 | End: 2021-04-13

## 2021-04-13 ASSESSMENT — PAIN SCALES - GENERAL: PAINLEVEL: NO PAIN (0)

## 2021-04-13 ASSESSMENT — MIFFLIN-ST. JEOR: SCORE: 1345.93

## 2021-04-13 NOTE — LETTER
"4/13/2021       RE: Lobo Isaacs  [address]  [place] [place] [address]     Dear Colleague,    Thank you for referring your patient, Lobo Isaacs, to the [facility] at [facility]. Please see a copy of my visit note below.    This very pleasant 70-year-old gentleman returns today for check cystoscopy  Mr. Lobo Isaacs has a history of 6 years ago having a very large bladder tumor, which had to be resected and has since then been followed very closely.  The FISH test has been positive until very recently.  In addition to that, the cytology was atypical.  Because of this, even though the previous cystoscopies in the office had not shown any lesions in the bladder, we decided that we should do bladder biopsies and retrograde pyelograms to be sure there is no evidence of residual carcinoma in situ.     We rebiopsy of the bladder 3 months ago.  FINAL DIAGNOSIS:   A: Urinary bladder, left lateral wall, biopsy:   - Urothelial proliferation of uncertain malignant potential.  See comment.   - Muscularis propria is not present.   - COMMENT: These lesions may be seen in isolation (usually in patients   with a prior history of papillary   urothelial carcinoma) or adjacent to low-grade papillary urothelial   neoplasms and may represent the \"shoulder\"   (lateral extension) of a papillary neoplasm.     B: Urinary bladder, anterior wall, biopsy:   - Negative for malignancy.   - Muscularis propria is not present.     C: Urinary bladder, right lateral wall, biopsy:   - Negative for malignancy.   - Muscularis propria is not present.     D: Urinary bladder, posterior wall, biopsy:   - Negative for malignancy.   - Chronic inflammation.   - Muscularis propria is not present.     E: Urinary bladder, trigone wall, biopsy:   - Negative for malignancy.   - Muscularis propria is not present.     F: Prostatic urethra, biopsy:   - Negative for malignancy.   - " Muscularis propria is not present.     Electronically signed out by:     Davion Cote M.D.     The biopsies were reassuring although one biopsy did show some changes they could call could not confirm that this was consistent with malignancy all the rest of the biopsies were quite negative.    Past Medical History:   Diagnosis Date     Anemia      Arthritis      Bladder cancer (H)      Hearing loss      History of blood transfusion 3/2015    bladder surgery(2 Units)     Hyperlipidemia      Hypertension      Mumps      Past Surgical History:   Procedure Laterality Date     COLONOSCOPY       CYSTOSCOPY       CYSTOSCOPY, BIOPSY BLADDER, COMBINED N/A 3/8/2018    Procedure: COMBINED CYSTOSCOPY, BIOPSY BLADDER;;  Surgeon: Coyd Torres MD;  Location:  OR     CYSTOSCOPY, BIOPSY BLADDER, COMBINED N/A 1/19/2021    Procedure: CYSTOSCOPY, WITH BLADDER BIOPSY;  Surgeon: Cody Torres MD;  Location:  OR     CYSTOSCOPY, CYSTOGRAM, COMBINED N/A 3/2/2015    Procedure: COMBINED CYSTOSCOPY, CYSTOGRAM;  Surgeon: Cody Torres MD;  Location:  OR     CYSTOSCOPY, FULGURATE BLEEDERS, EVACUATE CLOT(S), COMBINED N/A 3/2/2015    Procedure: COMBINED CYSTOSCOPY, FULGURATE BLEEDERS, EVACUATE CLOT(S);  Surgeon: Cody Torres MD;  Location:  OR     CYSTOSCOPY, FULGURATE BLEEDERS, EVACUATE CLOT(S), COMBINED N/A 3/15/2018    Procedure: COMBINED CYSTOSCOPY, FULGURATE BLEEDERS, EVACUATE CLOT(S);  CYSTOSCOPY, FULGURATION OF BLEEDERS.;  Surgeon: Cody Torres MD;  Location:  OR     CYSTOSCOPY, RETROGRADES, COMBINED  3/2/2015    Procedure: COMBINED CYSTOSCOPY, RETROGRADES;  Surgeon: Cody oTrres MD;  Location:  OR     CYSTOSCOPY, RETROGRADES, COMBINED  7/7/2015    Procedure: COMBINED CYSTOSCOPY, RETROGRADES;  Surgeon: Cody Torres MD;  Location:  OR     CYSTOSCOPY, RETROGRADES, COMBINED Bilateral 3/8/2018    Procedure: COMBINED CYSTOSCOPY, RETROGRADES;  CYSTOSCOPY,  BILATERAL RETROGRADES, TRANSURETHRAL RESECTION OF BLADDER TUMOR, BLADDER BIOPSY;  Surgeon: Cody Torres MD;  Location:  OR     CYSTOSCOPY, RETROGRADES, COMBINED Bilateral 1/19/2021    Procedure: CYSTOSCOPY, WITH BLADDER BIOPSY WITH FULGERATION, BILATERAL RETROGRADE PYELOGRAMS, CATHETER INSERTION;  Surgeon: Cody Torres MD;  Location:  OR     CYSTOSCOPY, TRANSURETHRAL RESECTION (TUR) TUMOR BLADDER, COMBINED N/A 3/2/2015    Procedure: COMBINED CYSTOSCOPY, TRANSURETHRAL RESECTION (TUR) TUMOR BLADDER;  Surgeon: Cody Torres MD;  Location: [facility]     CYSTOSCOPY, TRANSURETHRAL RESECTION (TUR) TUMOR BLADDER, COMBINED N/A 7/7/2015    Procedure: COMBINED CYSTOSCOPY, TRANSURETHRAL RESECTION (TUR) TUMOR BLADDER;  Surgeon: Cody Torres MD;  Location:  OR     CYSTOSCOPY, TRANSURETHRAL RESECTION (TUR) TUMOR BLADDER, COMBINED N/A 3/8/2018    Procedure: COMBINED CYSTOSCOPY, TRANSURETHRAL RESECTION (TUR) TUMOR BLADDER;;  Surgeon: Cody Torres MD;  Location:  OR     HERNIORRHAPHY INGUINAL Right 9/4/2015    Procedure: HERNIORRHAPHY INGUINAL;  Surgeon: Levi Warren MD;  Location: [facility]     LAPAROSCOPIC HERNIORRHAPHY INGUINAL Right 9/4/2015    Procedure: LAPAROSCOPIC HERNIORRHAPHY INGUINAL;  Surgeon: Levi Warren MD;  Location: [facility]       Current Outpatient Medications:      atorvastatin (LIPITOR) 10 MG tablet, Take 1 tablet (10 mg) by mouth daily (Patient taking differently: Take 10 mg by mouth every morning ), Disp: 90 tablet, Rfl: 3     ciprofloxacin (CIPRO) 500 MG tablet, Take 1 tablet (500 mg) by mouth once for 1 dose, Disp: 1 tablet, Rfl: 0     metoprolol succinate ER (TOPROL-XL) 50 MG 24 hr tablet, TAKE 1 TABLET BY MOUTH EVERY DAY, Disp: 90 tablet, Rfl: 3     Acetaminophen (TYLENOL PO), Take 500 mg by mouth every 4 hours as needed for mild pain or fever, Disp: , Rfl:     Current Facility-Administered Medications:      lidocaine (XYLOCAINE) 2 % external  "gel, , Urethral, Once, Melissa, Cody Rea MD      Procedure.  Cystoscopy.   Surgeon.    Melissa  Anesthesia.  Local anesthesia.  Description.  With the patient in the supine position, with the genital area prepped and draped in the customary fashion, with local anesthetic and the urethra, the flexible cystoscope was Inserted.  The penile urethra is normal.  The external sphincter is intact.  When you verumontanum there is mild enlargement of the lateral lobes of the prostate but the bladder is entered without difficulty.  There is mild bladder trabeculation.  There is no evidence of stone formation.  Careful scrutiny of the urothelium show no obvious evidence of recurrent tumor.  Even the patchy inflammation seen previously seems to have subsided.  There are no other remarkable features    Impression.  We will send a urine specimen for cytology today.  However the cystoscopy today is more reassuring.  I have however reiterated the importance of continuing close surveillance given the history and I recommend we repeat cystoscopy and a fish test in 6 months.  The propensity of this type of tumor to recur is significant typically if there is evidence of carcinoma in situ.  Other treatment options may need to be considered should carcinoma try to return in a significant way.  I discussed this with him in detail today and we did have a wide-ranging discussion about issues related to this regarding bladder cancer.  I addressed and answered many questions    Plan.  6 months for fish test and cystoscopy.    Time.  15 minutes spent in addition to procedure in view of the significance of this condition and a wide-ranging discussion over the issues encountered as a result    \"This dictation was performed with voice recognition software and may contain errors,  omissions and inadvertent word substitution.\"        Cody Torres MD      "

## 2021-04-13 NOTE — PROGRESS NOTES
"This very pleasant 70-year-old gentleman returns today for check cystoscopy  Mr. Lobo Isaacs has a history of 6 years ago having a very large bladder tumor, which had to be resected and has since then been followed very closely.  The FISH test has been positive until very recently.  In addition to that, the cytology was atypical.  Because of this, even though the previous cystoscopies in the office had not shown any lesions in the bladder, we decided that we should do bladder biopsies and retrograde pyelograms to be sure there is no evidence of residual carcinoma in situ.     We rebiopsy of the bladder 3 months ago.  FINAL DIAGNOSIS:   A: Urinary bladder, left lateral wall, biopsy:   - Urothelial proliferation of uncertain malignant potential.  See comment.   - Muscularis propria is not present.   - COMMENT: These lesions may be seen in isolation (usually in patients   with a prior history of papillary   urothelial carcinoma) or adjacent to low-grade papillary urothelial   neoplasms and may represent the \"shoulder\"   (lateral extension) of a papillary neoplasm.     B: Urinary bladder, anterior wall, biopsy:   - Negative for malignancy.   - Muscularis propria is not present.     C: Urinary bladder, right lateral wall, biopsy:   - Negative for malignancy.   - Muscularis propria is not present.     D: Urinary bladder, posterior wall, biopsy:   - Negative for malignancy.   - Chronic inflammation.   - Muscularis propria is not present.     E: Urinary bladder, trigone wall, biopsy:   - Negative for malignancy.   - Muscularis propria is not present.     F: Prostatic urethra, biopsy:   - Negative for malignancy.   - Muscularis propria is not present.     Electronically signed out by:     Davion Cote M.D.     The biopsies were reassuring although one biopsy did show some changes they could call could not confirm that this was consistent with malignancy all the rest of the biopsies were quite negative.    Past Medical " History:   Diagnosis Date     Anemia      Arthritis      Bladder cancer (H)      Hearing loss      History of blood transfusion 3/2015    bladder surgery(2 Units)     Hyperlipidemia      Hypertension      Mumps      Past Surgical History:   Procedure Laterality Date     COLONOSCOPY       CYSTOSCOPY       CYSTOSCOPY, BIOPSY BLADDER, COMBINED N/A 3/8/2018    Procedure: COMBINED CYSTOSCOPY, BIOPSY BLADDER;;  Surgeon: Cody Torres MD;  Location:  OR     CYSTOSCOPY, BIOPSY BLADDER, COMBINED N/A 1/19/2021    Procedure: CYSTOSCOPY, WITH BLADDER BIOPSY;  Surgeon: Cody Torres MD;  Location:  OR     CYSTOSCOPY, CYSTOGRAM, COMBINED N/A 3/2/2015    Procedure: COMBINED CYSTOSCOPY, CYSTOGRAM;  Surgeon: Cody Torres MD;  Location: [facility]     CYSTOSCOPY, FULGURATE BLEEDERS, EVACUATE CLOT(S), COMBINED N/A 3/2/2015    Procedure: COMBINED CYSTOSCOPY, FULGURATE BLEEDERS, EVACUATE CLOT(S);  Surgeon: Cody Torres MD;  Location:  OR     CYSTOSCOPY, FULGURATE BLEEDERS, EVACUATE CLOT(S), COMBINED N/A 3/15/2018    Procedure: COMBINED CYSTOSCOPY, FULGURATE BLEEDERS, EVACUATE CLOT(S);  CYSTOSCOPY, FULGURATION OF BLEEDERS.;  Surgeon: Cody Torres MD;  Location:  OR     CYSTOSCOPY, RETROGRADES, COMBINED  3/2/2015    Procedure: COMBINED CYSTOSCOPY, RETROGRADES;  Surgeon: Cody Torres MD;  Location:  OR     CYSTOSCOPY, RETROGRADES, COMBINED  7/7/2015    Procedure: COMBINED CYSTOSCOPY, RETROGRADES;  Surgeon: Cody Torres MD;  Location:  OR     CYSTOSCOPY, RETROGRADES, COMBINED Bilateral 3/8/2018    Procedure: COMBINED CYSTOSCOPY, RETROGRADES;  CYSTOSCOPY, BILATERAL RETROGRADES, TRANSURETHRAL RESECTION OF BLADDER TUMOR, BLADDER BIOPSY;  Surgeon: Cody Torres MD;  Location:  OR     CYSTOSCOPY, RETROGRADES, COMBINED Bilateral 1/19/2021    Procedure: CYSTOSCOPY, WITH BLADDER BIOPSY WITH FULGERATION, BILATERAL RETROGRADE PYELOGRAMS, CATHETER INSERTION;   Surgeon: Cody Torres MD;  Location:  OR     CYSTOSCOPY, TRANSURETHRAL RESECTION (TUR) TUMOR BLADDER, COMBINED N/A 3/2/2015    Procedure: COMBINED CYSTOSCOPY, TRANSURETHRAL RESECTION (TUR) TUMOR BLADDER;  Surgeon: Cody Torres MD;  Location:  OR     CYSTOSCOPY, TRANSURETHRAL RESECTION (TUR) TUMOR BLADDER, COMBINED N/A 7/7/2015    Procedure: COMBINED CYSTOSCOPY, TRANSURETHRAL RESECTION (TUR) TUMOR BLADDER;  Surgeon: Cody Torres MD;  Location: [facility]     CYSTOSCOPY, TRANSURETHRAL RESECTION (TUR) TUMOR BLADDER, COMBINED N/A 3/8/2018    Procedure: COMBINED CYSTOSCOPY, TRANSURETHRAL RESECTION (TUR) TUMOR BLADDER;;  Surgeon: Cody Torres MD;  Location:  OR     HERNIORRHAPHY INGUINAL Right 9/4/2015    Procedure: HERNIORRHAPHY INGUINAL;  Surgeon: Levi Warren MD;  [facility]     LAPAROSCOPIC HERNIORRHAPHY INGUINAL Right 9/4/2015    Procedure: LAPAROSCOPIC HERNIORRHAPHY INGUINAL;  Surgeon: Levi Warren MD;  [facility]       Current Outpatient Medications:      atorvastatin (LIPITOR) 10 MG tablet, Take 1 tablet (10 mg) by mouth daily (Patient taking differently: Take 10 mg by mouth every morning ), Disp: 90 tablet, Rfl: 3     ciprofloxacin (CIPRO) 500 MG tablet, Take 1 tablet (500 mg) by mouth once for 1 dose, Disp: 1 tablet, Rfl: 0     metoprolol succinate ER (TOPROL-XL) 50 MG 24 hr tablet, TAKE 1 TABLET BY MOUTH EVERY DAY, Disp: 90 tablet, Rfl: 3     Acetaminophen (TYLENOL PO), Take 500 mg by mouth every 4 hours as needed for mild pain or fever, Disp: , Rfl:     Current Facility-Administered Medications:      lidocaine (XYLOCAINE) 2 % external gel, , Urethral, Once, Cody Torres MD      Procedure.  Cystoscopy.   Surgeon.    Melissa  Anesthesia.  Local anesthesia.  Description.  With the patient in the supine position, with the genital area prepped and draped in the customary fashion, with local anesthetic and the urethra, the flexible  "cystoscope was Inserted.  The penile urethra is normal.  The external sphincter is intact.  When you verumontanum there is mild enlargement of the lateral lobes of the prostate but the bladder is entered without difficulty.  There is mild bladder trabeculation.  There is no evidence of stone formation.  Careful scrutiny of the urothelium show no obvious evidence of recurrent tumor.  Even the patchy inflammation seen previously seems to have subsided.  There are no other remarkable features    Impression.  We will send a urine specimen for cytology today.  However the cystoscopy today is more reassuring.  I have however reiterated the importance of continuing close surveillance given the history and I recommend we repeat cystoscopy and a fish test in 6 months.  The propensity of this type of tumor to recur is significant typically if there is evidence of carcinoma in situ.  Other treatment options may need to be considered should carcinoma try to return in a significant way.  I discussed this with him in detail today and we did have a wide-ranging discussion about issues related to this regarding bladder cancer.  I addressed and answered many questions    Plan.  6 months for fish test and cystoscopy.    Time.  15 minutes spent in addition to procedure in view of the significance of this condition and a wide-ranging discussion over the issues encountered as a result    \"This dictation was performed with voice recognition software and may contain errors,  omissions and inadvertent word substitution.\"      "

## 2021-04-13 NOTE — NURSING NOTE
Chief Complaint   Patient presents with     Malignant neoplasm of urinary bladder     Here for a in office cystoscopy      Prior to the start of the procedure and with procedural staff participation, I verbally confirmed the patient s identity using two indicators, relevant allergies, that the procedure was appropriate and matched the consent or emergent situation, and that the correct equipment/implants were available. Immediately prior to starting the procedure I conducted the Time Out with the procedural staff and re-confirmed the patient s name, procedure, and site/side. I have wiped the patient off with the povidone-Iodine solution, draped them,  used Lidocaine hydrochloride jelly, and instilled sterile water into the bladder. (The Joint Commission universal protocol was followed.)  Yes    Sedation (Moderate or Deep): Urojet    5mL 2% lidocaine hydrochloride Urojet instilled into urethra.    NDC# [address]-2  Lot #: [id number]  Expiration Date:  7-22    Bethany [place]

## 2021-04-13 NOTE — PATIENT INSTRUCTIONS
"AFTER YOUR CYSTOSCOPY  ?  ?  You have just completed a cystoscopy, or \"cysto\", which allowed your physician to learn more about your bladder (or to remove a stent placed after surgery). We suggest that you continue to avoid caffeine, fruit juice, and alcohol for the next 24 hours, however, you are encouraged to return to your normal activities.  ?  ?  A few things that are considered normal after your cystoscopy:  ?  * small amount of bleeding (or spotting) that clears within the next 24 hours  ?  * slight burning sensation with urination  ?  * sensation of needing to void (urinate) more frequently  ?  * the feeling of \"air\" in your urine  ?  * mild discomfort that is relieved with Tylenol    * bladder spasms  ?  ?  ?  Please contact our office promptly if you:  ?  * develop a fever above 101 degrees  ?  * are unable to urinate  ?  * develop bright red blood that does not stop  ?  * experience severe pain or swelling  ?  ?  ?  And of course, please contact our office with any concerns or questions [phone]  ?      AFTER YOUR CYSTOSCOPY        You have just completed a cystoscopy, or \"cysto\", which allowed your physician to learn more about your bladder (or to remove a stent placed after surgery). We suggest that you continue to avoid caffeine, fruit juice, and alcohol for the next 24 hours, however, you are encouraged to return to your normal activities.         A few things that are considered normal after your cystoscopy:     * Small amount of bleeding (or spotting) that clears within the next 24 hours     * Slight burning sensation with urination     * Sensation to of needing to avoid more frequently     * The feeling of \"air\" in your urine     * Mild discomfort that is relieved with Tylenol        Please contact our office promptly if you:     * Develop a fever above 101 degrees     * Are unable to urinate     * Develop bright red blood that does not stop     * Severe pain or swelling         Please contact " our office with any concerns or questions @[facility].

## 2021-04-14 LAB — COPATH REPORT: NORMAL

## 2021-10-13 ENCOUNTER — OFFICE VISIT (OUTPATIENT)
Dept: UROLOGY | Facility: CLINIC | Age: 71
End: 2021-10-13
Payer: COMMERCIAL

## 2021-10-13 VITALS
HEIGHT: 68 IN | WEIGHT: 130 LBS | OXYGEN SATURATION: 100 % | HEART RATE: 64 BPM | SYSTOLIC BLOOD PRESSURE: 140 MMHG | DIASTOLIC BLOOD PRESSURE: 80 MMHG | BODY MASS INDEX: 19.7 KG/M2

## 2021-10-13 DIAGNOSIS — C67.9 MALIGNANT NEOPLASM OF URINARY BLADDER, UNSPECIFIED SITE (H): Primary | ICD-10-CM

## 2021-10-13 LAB
ALBUMIN UR-MCNC: NEGATIVE MG/DL
APPEARANCE UR: CLEAR
BILIRUB UR QL STRIP: NEGATIVE
COLOR UR AUTO: YELLOW
GLUCOSE UR STRIP-MCNC: NEGATIVE MG/DL
HGB UR QL STRIP: ABNORMAL
KETONES UR STRIP-MCNC: NEGATIVE MG/DL
LEUKOCYTE ESTERASE UR QL STRIP: NEGATIVE
NITRATE UR QL: NEGATIVE
PH UR STRIP: 5.5 [PH] (ref 5–7)
SP GR UR STRIP: 1.02 (ref 1–1.03)
UROBILINOGEN UR STRIP-ACNC: 0.2 E.U./DL

## 2021-10-13 PROCEDURE — 99213 OFFICE O/P EST LOW 20 MIN: CPT | Mod: 25 | Performed by: UROLOGY

## 2021-10-13 PROCEDURE — 88112 CYTOPATH CELL ENHANCE TECH: CPT | Performed by: PATHOLOGY

## 2021-10-13 PROCEDURE — 52000 CYSTOURETHROSCOPY: CPT | Performed by: UROLOGY

## 2021-10-13 PROCEDURE — 81003 URINALYSIS AUTO W/O SCOPE: CPT | Mod: QW | Performed by: UROLOGY

## 2021-10-13 RX ORDER — LIDOCAINE HYDROCHLORIDE 20 MG/ML
JELLY TOPICAL ONCE
Status: DISCONTINUED | OUTPATIENT
Start: 2021-10-13 | End: 2021-10-13 | Stop reason: HOSPADM

## 2021-10-13 ASSESSMENT — MIFFLIN-ST. JEOR: SCORE: 1309.18

## 2021-10-13 ASSESSMENT — PAIN SCALES - GENERAL: PAINLEVEL: NO PAIN (0)

## 2021-10-13 NOTE — PATIENT INSTRUCTIONS
"AFTER YOUR CYSTOSCOPY  ?  ?  You have just completed a cystoscopy, or \"cysto\", which allowed your physician to learn more about your bladder (or to remove a stent placed after surgery). We suggest that you continue to avoid caffeine, fruit juice, and alcohol for the next 24 hours, however, you are encouraged to return to your normal activities.  ?  ?  A few things that are considered normal after your cystoscopy:  ?  * small amount of bleeding (or spotting) that clears within the next 24 hours  ?  * slight burning sensation with urination  ?  * sensation of needing to void (urinate) more frequently  ?  * the feeling of \"air\" in your urine  ?  * mild discomfort that is relieved with Tylenol    * bladder spasms  ?  ?  ?  Please contact our office promptly if you:  ?  * develop a fever above 101 degrees  ?  * are unable to urinate  ?  * develop bright red blood that does not stop  ?  * experience severe pain or swelling  ?  ?  ?  And of course, please contact our office with any concerns or questions [phone]  ?      AFTER YOUR CYSTOSCOPY        You have just completed a cystoscopy, or \"cysto\", which allowed your physician to learn more about your bladder (or to remove a stent placed after surgery). We suggest that you continue to avoid caffeine, fruit juice, and alcohol for the next 24 hours, however, you are encouraged to return to your normal activities.         A few things that are considered normal after your cystoscopy:     * Small amount of bleeding (or spotting) that clears within the next 24 hours     * Slight burning sensation with urination     * Sensation to of needing to avoid more frequently     * The feeling of \"air\" in your urine     * Mild discomfort that is relieved with Tylenol        Please contact our office promptly if you:     * Develop a fever above 101 degrees     * Are unable to urinate     * Develop bright red blood that does not stop     * Severe pain or swelling         Please contact " our office with any concerns or questions @[facility].

## 2021-10-13 NOTE — LETTER
"10/13/2021       RE: Lobo Isaacs  [address]  [facility] [address]     Dear Colleague,    Thank you for referring your patient, Lobo Isaacs, to the [facility] at [facility]. Please see a copy of my visit note below.    [place]   CHIEF COMPLAINT   It was my pleasure to see Lobo Isaacs who is a 71 year old male for follow-up of BLADDER CANCER.      HPI   Lobo Isaacs is a very pleasant 71 year old male   Prior Dr. Torres patient - last seen 4/13/21:  \"Mr. Lobo Isaacs has a history of 6 years ago having a very large bladder tumor, which had to be resected and has since then been followed very closely.  The FISH test has been positive until very recently.  In addition to that, the cytology was atypical.  Because of this, even though the previous cystoscopies in the office had not shown any lesions in the bladder, we decided that we should do bladder biopsies and retrograde pyelograms to be sure there is no evidence of residual carcinoma in situ. We rebiopsy of the bladder 3 months ago.  Cystoscopy: no evidence of disease     TODAY 10/13/21:  Follow-up today for surveillance cystoscopy  He has been doing very well with no changes in his urination  No hematuria    PHYSICAL EXAM  Patient is a 71 year old  male   Vitals: Blood pressure (!) 140/80, pulse 64, height 1.727 m (5' 8\"), weight 59 kg (130 lb), SpO2 100 %.  Body mass index is 19.77 kg/m .  General Appearance Adult:   Alert, no acute distress, oriented  HENT: throat/mouth:normal, good dentition  Lungs: no respiratory distress, or pursed lip breathing  Heart: No obvious jugular venous distension present  Abdomen: soft, nontender, no organomegaly or masses  Musculoskeltal: extremities normal, no peripheral edema  Skin: no suspicious lesions or rashes  Neuro: Alert, oriented, speech and mentation normal  Psych: affect and mood normal  Gait: Normal  : penis, scrotum, " "testes normal    UA RESULTS:  Recent Labs   Lab Test 10/13/21  1313 03/18/20  0850 03/03/20  0805   COLOR Yellow   < > Yellow   APPEARANCE Clear   < > Clear   URINEGLC Negative   < > Negative   URINEBILI Negative   < > Negative   URINEKETONE Negative   < > Negative   SG 1.025   < > 1.015   UBLD Moderate*   < > Small*   URINEPH 5.5   < > 7.0   PROTEIN Negative   < > Negative   UROBILINOGEN 0.2   < > 0.2   NITRITE Negative   < > Negative   LEUKEST Negative   < > Negative   RBCU  --   --  O - 2   WBCU  --   --  0 - 5    < > = values in this interval not displayed.      Creatinine   Date Value Ref Range Status   01/15/2021 0.87 0.66 - 1.25 mg/dL Final     CYTOLOGY:  4/13/21:  Urine, voided:   Negative for High-Grade Urothelial Carcinoma.   Crystals present. Degenerated urothelial cells present.     11/18/20:  Urine, voided:   Atypical urothelial cells   Specimen Adequacy: Satisfactory for evaluation.    PATHOLOGY:  1/19/21:  FINAL DIAGNOSIS:   A: Urinary bladder, left lateral wall, biopsy:   - Urothelial proliferation of uncertain malignant potential.  See comment.   - Muscularis propria is not present.   - COMMENT: These lesions may be seen in isolation (usually in patients   with a prior history of papillary   urothelial carcinoma) or adjacent to low-grade papillary urothelial   neoplasms and may represent the \"shoulder\"   (lateral extension) of a papillary neoplasm.     B: Urinary bladder, anterior wall, biopsy:   - Negative for malignancy.   - Muscularis propria is not present.     C: Urinary bladder, right lateral wall, biopsy:   - Negative for malignancy.   - Muscularis propria is not present.     D: Urinary bladder, posterior wall, biopsy:   - Negative for malignancy.   - Chronic inflammation.   - Muscularis propria is not present.     E: Urinary bladder, trigone wall, biopsy:   - Negative for malignancy.   - Muscularis propria is not present.     F: Prostatic urethra, biopsy:   - Negative for malignancy.   - " Muscularis propria is not present.     3/7/18:  FINAL DIAGNOSIS:   A: Urinary bladder, anterior wall, biopsy: Negative for malignancy.     B: Urinary bladder, left lateral wall, biopsy: Negative for malignancy.     C: Urinary bladder, posterior wall, biopsy: Negative for malignancy.     D: Urinary bladder, tumor: Non-invasive low-grade papillary urothelial   carcinoma.  See synoptic summary.     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     IMAGING:  All pertinent imaging reviewed:    All imaging studies reviewed by me.  I personally reviewed these imaging films.  A formal report from radiology will follow.    CT ABD/PEL 3/5/18:  FINDINGS: There is slight high density of the renal pyramids  bilaterally, likely a variant of normal. No urinary tract calculi are  demonstrated. There is symmetric enhancement and excretion of contrast  from both kidneys. No evidence of solid renal mass. There is a filling  defect in the urinary bladder adjacent to the right ureterovesical  junction that measures 1.3 cm (series 7, image 70).     A small calcified gallstone is noted. No CT evidence of cholecystitis.  The liver, spleen, pancreas, and adrenal glands are unremarkable. No  abdominal or retroperitoneal adenopathy. No pelvic lymphadenopathy. No  free fluid in the pelvis. No suspicious bony lesions.                                                                      IMPRESSION:  1. There is a filling defect in the urinary bladder adjacent to the  right ureterovesical junction that could represent a mass or a blood  clot. Correlate with cystoscopy. No other cause for  hematuria  demonstrated.  2. Cholelithiasis.    ASSESSMENT and PLAN  71-year-old man with history of low-grade noninvasive bladder cancer with prior positive cytology.  Most recent biopsy on 1/19/2021 was negative for malignancy    Low-grade noninvasive bladder cancer  -Cystoscopy today with a few areas of erythema around prior biopsy sites.  No distinct bladder tumors or lesions  -Urine cytology will be sent today  -We discussed that I would like to see him back in 6 months for repeat cystoscopy to ensure stability of the abnormal areas of bladder mucosa  -If things look stable in 6 months we will then plan for annual follow-up  -I reviewed his prior pathology results, prior cytology results, and I reviewed his CT scan from 2018 and agree with radiologist interpretations.      Time spent: 20 minutes spent on the date of the encounter doing chart review, history and exam, documentation and further activities as noted above.  This was in addition to cystoscopy time    Kevin Robertson MD   Urology  [facility]  [facility] Phone: [phone]  [facility] Phone: [phone]

## 2021-10-13 NOTE — PROCEDURES
CYSTOSCOPY PROCEDURE NOTE:    Lobo Isaacs is a 71 year old male  who presents with h/o low grade bladder cancer for cystoscopy.    Pt ID verified with patient: Yes     Procedure verified with patient: Yes     Procedure confirmed with physician and support staff: Yes     Consent form confirmed with physician and support staff.    Sign In  History and Physical Exam reviewed .  Informed Consent Discussed: Yes   Sign in Communication: Yes   Time Out:  Team Confirms the Correct Patient, Correct Procedure; Yes , Correct Site and Site Marking, Correct Position (if applicable).    Affirmation of Time Out: Yes   Sign Out:  Sign Out Discussion: Yes   Physician: Kevin Robertson MD    A urinalysis was performed revealing no evidence of infection.    The benefits, risks, alternatives of the cystoscopy procedure and personnel were discussed with the patient. The verbal consent was obtained and the patient agrees to proceed.      Description of procedure:   After fully informed, voluntary consent was obtained, the patient was brought into the procedure room, identified and placed in a supine position on the cystoscopy table.  The groin/scrotum were prepped with betadine and draped in a sterile fashion.  Urojet lidocaine gel was introduced.  A 15F flexible cystoscope was inserted into the urethra, and the bladder and urethra wereexamined in a systematic manner.  The patient tolerated the procedure well and there were no complications.      Cystoscopic findings:  The urethra was normal without strictures.  The prostate was 3-4cm long and demonstrated mild bilobar hypertrophy.  There was no median lobe.  The external sphincter coapted normally and the bladder neck was normal. The bladder was  entered and careful pan endoscopy was carried out. The posterior, superior and lateral walls and dome of the bladder were all well visualized and the scope was retroflexed upon itself..  There was mild trabeculation.  There were no stones, or  diverticula identifed.  The ureteric orifices were normal in position and number and effluxing clear urine.  There was a small area of erythema on the left posterior wall just beyond an area of prior biopsy which was well-healed.  The other areas of biopsy were all well-healed.  There is also some abnormal appearing mucosa near the left bladder neck.  No distinct papillary lesions.    Assessment/Plan:   Lobo Isaacs is a 71 year old male with a history of low-grade noninvasive bladder cancer     -See clinic note      Kevin Robertson MD

## 2021-10-13 NOTE — PROGRESS NOTES
"[facility]   CHIEF COMPLAINT   It was my pleasure to see Lobo Isaacs who is a 71 year old male for follow-up of BLADDER CANCER.      HPI   Lobo Isaacs is a very pleasant 71 year old male   Prior Dr. Torres patient - last seen 4/13/21:  \"Mr. Lobo Isaacs has a history of 6 years ago having a very large bladder tumor, which had to be resected and has since then been followed very closely.  The FISH test has been positive until very recently.  In addition to that, the cytology was atypical.  Because of this, even though the previous cystoscopies in the office had not shown any lesions in the bladder, we decided that we should do bladder biopsies and retrograde pyelograms to be sure there is no evidence of residual carcinoma in situ. We rebiopsy of the bladder 3 months ago.  Cystoscopy: no evidence of disease     TODAY 10/13/21:  Follow-up today for surveillance cystoscopy  He has been doing very well with no changes in his urination  No hematuria    PHYSICAL EXAM  Patient is a 71 year old  male   Vitals: Blood pressure (!) 140/80, pulse 64, height 1.727 m (5' 8\"), weight 59 kg (130 lb), SpO2 100 %.  Body mass index is 19.77 kg/m .  General Appearance Adult:   Alert, no acute distress, oriented  HENT: throat/mouth:normal, good dentition  Lungs: no respiratory distress, or pursed lip breathing  Heart: No obvious jugular venous distension present  Abdomen: soft, nontender, no organomegaly or masses  Musculoskeltal: extremities normal, no peripheral edema  Skin: no suspicious lesions or rashes  Neuro: Alert, oriented, speech and mentation normal  Psych: affect and mood normal  Gait: Normal  : penis, scrotum, testes normal    UA RESULTS:  Recent Labs   Lab Test 10/13/21  1313 03/18/20  0850 03/03/20  0805   COLOR Yellow   < > Yellow   APPEARANCE Clear   < > Clear   URINEGLC Negative   < > Negative   URINEBILI Negative   < > Negative   URINEKETONE Negative   < > Negative   SG 1.025   < > 1.015   UBLD Moderate*   < > " "Small*   URINEPH 5.5   < > 7.0   PROTEIN Negative   < > Negative   UROBILINOGEN 0.2   < > 0.2   NITRITE Negative   < > Negative   LEUKEST Negative   < > Negative   RBCU  --   --  O - 2   WBCU  --   --  0 - 5    < > = values in this interval not displayed.      Creatinine   Date Value Ref Range Status   01/15/2021 0.87 0.66 - 1.25 mg/dL Final     CYTOLOGY:  4/13/21:  Urine, voided:   Negative for High-Grade Urothelial Carcinoma.   Crystals present. Degenerated urothelial cells present.     11/18/20:  Urine, voided:   Atypical urothelial cells   Specimen Adequacy: Satisfactory for evaluation.    PATHOLOGY:  1/19/21:  FINAL DIAGNOSIS:   A: Urinary bladder, left lateral wall, biopsy:   - Urothelial proliferation of uncertain malignant potential.  See comment.   - Muscularis propria is not present.   - COMMENT: These lesions may be seen in isolation (usually in patients   with a prior history of papillary   urothelial carcinoma) or adjacent to low-grade papillary urothelial   neoplasms and may represent the \"shoulder\"   (lateral extension) of a papillary neoplasm.     B: Urinary bladder, anterior wall, biopsy:   - Negative for malignancy.   - Muscularis propria is not present.     C: Urinary bladder, right lateral wall, biopsy:   - Negative for malignancy.   - Muscularis propria is not present.     D: Urinary bladder, posterior wall, biopsy:   - Negative for malignancy.   - Chronic inflammation.   - Muscularis propria is not present.     E: Urinary bladder, trigone wall, biopsy:   - Negative for malignancy.   - Muscularis propria is not present.     F: Prostatic urethra, biopsy:   - Negative for malignancy.   - Muscularis propria is not present.     3/7/18:  FINAL DIAGNOSIS:   A: Urinary bladder, anterior wall, biopsy: Negative for malignancy.     B: Urinary bladder, left lateral wall, biopsy: Negative for malignancy.     C: Urinary bladder, posterior wall, biopsy: Negative for malignancy.     D: Urinary bladder, tumor: " Non-invasive low-grade papillary urothelial   carcinoma.  See synoptic summary.     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     IMAGING:  All pertinent imaging reviewed:    All imaging studies reviewed by me.  I personally reviewed these imaging films.  A formal report from radiology will follow.    CT ABD/PEL 3/5/18:  FINDINGS: There is slight high density of the renal pyramids  bilaterally, likely a variant of normal. No urinary tract calculi are  demonstrated. There is symmetric enhancement and excretion of contrast  from both kidneys. No evidence of solid renal mass. There is a filling  defect in the urinary bladder adjacent to the right ureterovesical  junction that measures 1.3 cm (series 7, image 70).     A small calcified gallstone is noted. No CT evidence of cholecystitis.  The liver, spleen, pancreas, and adrenal glands are unremarkable. No  abdominal or retroperitoneal adenopathy. No pelvic lymphadenopathy. No  free fluid in the pelvis. No suspicious bony lesions.                                                                      IMPRESSION:  1. There is a filling defect in the urinary bladder adjacent to the  right ureterovesical junction that could represent a mass or a blood  clot. Correlate with cystoscopy. No other cause for hematuria  demonstrated.  2. Cholelithiasis.    ASSESSMENT and PLAN  71-year-old man with history of low-grade noninvasive bladder cancer with prior positive cytology.  Most recent biopsy on 1/19/2021 was negative for malignancy    Low-grade noninvasive bladder cancer  -Cystoscopy today with a few areas of erythema around prior biopsy  sites.  No distinct bladder tumors or lesions  -Urine cytology will be sent today  -We discussed that I would like to see him back in 6 months for repeat cystoscopy to ensure stability of the abnormal areas of bladder mucosa  -If things look stable in 6 months we will then plan for annual follow-up  -I reviewed his prior pathology results, prior cytology results, and I reviewed his CT scan from 2018 and agree with radiologist interpretations.      Time spent: 20 minutes spent on the date of the encounter doing chart review, history and exam, documentation and further activities as noted above.  This was in addition to cystoscopy time    Kevin Robertson MD   Urology  [facility]  [employer] Phone: [phone]  [facility] Phone: [phone]

## 2021-10-13 NOTE — NURSING NOTE
Chief Complaint   Patient presents with     Maligant neoplasm of urinary bladder     Here for a in office cystoscopy     Prior to the start of the procedure and with procedural staff participation, I verbally confirmed the patient s identity using two indicators, relevant allergies, that the procedure was appropriate and matched the consent or emergent situation, and that the correct equipment/implants were available. Immediately prior to starting the procedure I conducted the Time Out with the procedural staff and re-confirmed the patient s name, procedure, and site/side. I have wiped the patient off with the povidone-Iodine solution, draped them,  used Lidocaine hydrochloride jelly, and instilled sterile water into the bladder. (The Joint Commission universal protocol was followed.)  Yes    Sedation (Moderate or Deep): Urojet    5mL 2% lidocaine hydrochloride Urojet instilled into urethra.    NDC# [address]-8  Lot #: [id number]  Expiration Date:  7-22    Bethany [place]

## 2021-10-14 LAB
PATH REPORT.COMMENTS IMP SPEC: ABNORMAL
PATH REPORT.COMMENTS IMP SPEC: YES
PATH REPORT.FINAL DX SPEC: ABNORMAL
PATH REPORT.GROSS SPEC: ABNORMAL
PATH REPORT.MICROSCOPIC SPEC OTHER STN: ABNORMAL
PATH REPORT.RELEVANT HX SPEC: ABNORMAL

## 2021-10-27 ENCOUNTER — ALLIED HEALTH/NURSE VISIT (OUTPATIENT)
Dept: FAMILY MEDICINE | Facility: CLINIC | Age: 71
End: 2021-10-27
Payer: COMMERCIAL

## 2021-10-27 DIAGNOSIS — Z23 NEED FOR PROPHYLACTIC VACCINATION AND INOCULATION AGAINST INFLUENZA: Primary | ICD-10-CM

## 2021-10-27 PROCEDURE — 99207 PR NO CHARGE NURSE ONLY: CPT

## 2021-10-27 PROCEDURE — 90662 IIV NO PRSV INCREASED AG IM: CPT

## 2021-10-27 PROCEDURE — G0008 ADMIN INFLUENZA VIRUS VAC: HCPCS

## 2021-12-14 DIAGNOSIS — E78.00 PURE HYPERCHOLESTEROLEMIA: ICD-10-CM

## 2021-12-15 NOTE — TELEPHONE ENCOUNTER
Routing refill request to provider for review/approval because:  Labs not current:  LDL    LDL Cholesterol Calculated   Date Value Ref Range Status   03/03/2020 88 <100 mg/dL Final     Comment:     Desirable:       <100 mg/dl     Last Written Prescription Date:  12/23/2020  Last Fill Quantity: 90,  # refills: 3   Last office visit: 1/15/2021 with prescribing provider:  Tito Bray   Future Office Visit:  None    Team Coordinators -  --Please contact patient and ask to schedule an annual visit/physical with fasting labs.    Bruna Brothers RN  [facility]

## 2021-12-16 RX ORDER — ATORVASTATIN CALCIUM 10 MG/1
TABLET, FILM COATED ORAL
Qty: 90 TABLET | Refills: 3 | Status: SHIPPED | OUTPATIENT
Start: 2021-12-16 | End: 2022-12-30

## 2022-04-13 ENCOUNTER — OFFICE VISIT (OUTPATIENT)
Dept: UROLOGY | Facility: CLINIC | Age: 72
End: 2022-04-13
Payer: COMMERCIAL

## 2022-04-13 VITALS
WEIGHT: 130 LBS | DIASTOLIC BLOOD PRESSURE: 84 MMHG | HEART RATE: 68 BPM | SYSTOLIC BLOOD PRESSURE: 140 MMHG | HEIGHT: 69 IN | OXYGEN SATURATION: 94 % | BODY MASS INDEX: 19.26 KG/M2

## 2022-04-13 DIAGNOSIS — C67.9 MALIGNANT NEOPLASM OF URINARY BLADDER, UNSPECIFIED SITE (H): Primary | ICD-10-CM

## 2022-04-13 LAB
ALBUMIN UR-MCNC: NEGATIVE MG/DL
APPEARANCE UR: CLEAR
BILIRUB UR QL STRIP: NEGATIVE
COLOR UR AUTO: YELLOW
GLUCOSE UR STRIP-MCNC: NEGATIVE MG/DL
HGB UR QL STRIP: ABNORMAL
KETONES UR STRIP-MCNC: ABNORMAL MG/DL
LEUKOCYTE ESTERASE UR QL STRIP: NEGATIVE
NITRATE UR QL: NEGATIVE
PH UR STRIP: 6 [PH] (ref 5–7)
SP GR UR STRIP: 1.02 (ref 1–1.03)
UROBILINOGEN UR STRIP-ACNC: 0.2 E.U./DL

## 2022-04-13 PROCEDURE — 52000 CYSTOURETHROSCOPY: CPT | Performed by: UROLOGY

## 2022-04-13 PROCEDURE — 81003 URINALYSIS AUTO W/O SCOPE: CPT | Mod: QW | Performed by: UROLOGY

## 2022-04-13 PROCEDURE — 99213 OFFICE O/P EST LOW 20 MIN: CPT | Mod: 25 | Performed by: UROLOGY

## 2022-04-13 RX ORDER — LIDOCAINE HYDROCHLORIDE 20 MG/ML
JELLY TOPICAL ONCE
Status: COMPLETED | OUTPATIENT
Start: 2022-04-13 | End: 2022-04-13

## 2022-04-13 RX ADMIN — LIDOCAINE HYDROCHLORIDE 5 ML: 20 JELLY TOPICAL at 13:35

## 2022-04-13 ASSESSMENT — PAIN SCALES - GENERAL: PAINLEVEL: NO PAIN (0)

## 2022-04-13 NOTE — PROGRESS NOTES
"[facility]   CHIEF COMPLAINT   It was my pleasure to see Lobo Isaacs who is a 71 year old male for follow-up of BLADDER CANCER.      HPI   Lobo Isaacs is a very pleasant 71 year old male   Prior Dr. Torres patient - last seen 4/13/21:  \"Mr. Lobo Isaacs has a history of 6 years ago having a very large bladder tumor, which had to be resected and has since then been followed very closely.  The FISH test has been positive until very recently.  In addition to that, the cytology was atypical.  Because of this, even though the previous cystoscopies in the office had not shown any lesions in the bladder, we decided that we should do bladder biopsies and retrograde pyelograms to be sure there is no evidence of residual carcinoma in situ. We rebiopsy of the bladder 3 months ago.  Cystoscopy: no evidence of disease     10/13/21:  Follow-up today for surveillance cystoscopy  He has been doing very well with no changes in his urination  No hematuria    TODAY 4/13/2022   Follow-up today for surveillance cystoscopy  He has been doing well with no change in symptoms    PHYSICAL EXAM  Patient is a 71 year old  male   Vitals: Blood pressure (!) 140/84, pulse 68, height 1.74 m (5' 8.5\"), weight 59 kg (130 lb), SpO2 94 %.  Body mass index is 19.48 kg/m .  General Appearance Adult:   Alert, no acute distress, oriented  HENT: throat/mouth:normal, good dentition  Lungs: no respiratory distress, or pursed lip breathing  Heart: No obvious jugular venous distension present  Abdomen: soft, nontender, no organomegaly or masses  Musculoskeltal: extremities normal, no peripheral edema  Skin: no suspicious lesions or rashes  Neuro: Alert, oriented, speech and mentation normal  Psych: affect and mood normal  Gait: Normal  : penis, scrotum, testes normal    UA RESULTS:  Recent Labs   Lab Test 10/13/21  1313 03/18/20  0850 03/03/20  0805   COLOR Yellow   < > Yellow   APPEARANCE Clear   < > Clear   URINEGLC Negative   < > Negative " "  URINEBILI Negative   < > Negative   URINEKETONE Negative   < > Negative   SG 1.025   < > 1.015   UBLD Moderate*   < > Small*   URINEPH 5.5   < > 7.0   PROTEIN Negative   < > Negative   UROBILINOGEN 0.2   < > 0.2   NITRITE Negative   < > Negative   LEUKEST Negative   < > Negative   RBCU  --   --  O - 2   WBCU  --   --  0 - 5    < > = values in this interval not displayed.      Creatinine   Date Value Ref Range Status   01/15/2021 0.87 0.66 - 1.25 mg/dL Final     CYTOLOGY:  4/13/21:  Urine, voided:   Negative for High-Grade Urothelial Carcinoma.   Crystals present. Degenerated urothelial cells present.     11/18/20:  Urine, voided:   Atypical urothelial cells   Specimen Adequacy: Satisfactory for evaluation.    PATHOLOGY:  1/19/21:  FINAL DIAGNOSIS:   A: Urinary bladder, left lateral wall, biopsy:   - Urothelial proliferation of uncertain malignant potential.  See comment.   - Muscularis propria is not present.   - COMMENT: These lesions may be seen in isolation (usually in patients   with a prior history of papillary   urothelial carcinoma) or adjacent to low-grade papillary urothelial   neoplasms and may represent the \"shoulder\"   (lateral extension) of a papillary neoplasm.     B: Urinary bladder, anterior wall, biopsy:   - Negative for malignancy.   - Muscularis propria is not present.     C: Urinary bladder, right lateral wall, biopsy:   - Negative for malignancy.   - Muscularis propria is not present.     D: Urinary bladder, posterior wall, biopsy:   - Negative for malignancy.   - Chronic inflammation.   - Muscularis propria is not present.     E: Urinary bladder, trigone wall, biopsy:   - Negative for malignancy.   - Muscularis propria is not present.     F: Prostatic urethra, biopsy:   - Negative for malignancy.   - Muscularis propria is not present.     3/7/18:  FINAL DIAGNOSIS:   A: Urinary bladder, anterior wall, biopsy: Negative for malignancy.     B: Urinary bladder, left lateral wall, biopsy: Negative " for malignancy.     C: Urinary bladder, posterior wall, biopsy: Negative for malignancy.     D: Urinary bladder, tumor: Non-invasive low-grade papillary urothelial   carcinoma.  See synoptic summary.     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     IMAGING:  All pertinent imaging reviewed:    All imaging studies reviewed by me.  I personally reviewed these imaging films.  A formal report from radiology will follow.    CT ABD/PEL 3/5/18:  FINDINGS: There is slight high density of the renal pyramids  bilaterally, likely a variant of normal. No urinary tract calculi are  demonstrated. There is symmetric enhancement and excretion of contrast  from both kidneys. No evidence of solid renal mass. There is a filling  defect in the urinary bladder adjacent to the right ureterovesical  junction that measures 1.3 cm (series 7, image 70).     A small calcified gallstone is noted. No CT evidence of cholecystitis.  The liver, spleen, pancreas, and adrenal glands are unremarkable. No  abdominal or retroperitoneal adenopathy. No pelvic lymphadenopathy. No  free fluid in the pelvis. No suspicious bony lesions.                                                                      IMPRESSION:  1. There is a filling defect in the urinary bladder adjacent to the  right ureterovesical junction that could represent a mass or a blood  clot. Correlate with cystoscopy. No other cause for hematuria  demonstrated.  2. Cholelithiasis.    ASSESSMENT and PLAN  71-year-old man with history of low-grade noninvasive bladder cancer with prior positive cytology.  Most recent biopsy on 1/19/2021 was negative for  malignancy    Low-grade noninvasive bladder cancer  -Cystoscopy today with a few areas of erythema around prior biopsy sites.  This is stable from his last cystoscopy 6 months ago  -We discussed that I would like to see him back in 12 months for repeat cystoscopy  -I reviewed his prior pathology results, prior cytology results, and I reviewed his CT scan from 2018 and agree with radiologist interpretations.      Time spent: 20 minutes spent on the date of the encounter doing chart review, history and exam, documentation and further activities as noted above.  This was in addition to cystoscopy time    Kevin Robertson MD   Urology  [facility]  [facility] Phone: [phone]  [facility] Phone: [phone]

## 2022-04-13 NOTE — LETTER
Date:April 14, 2022      Provider requested that no letter be sent. Do not send.       [facility]

## 2022-04-13 NOTE — PATIENT INSTRUCTIONS
"AFTER YOUR CYSTOSCOPY  ?  ?  You have just completed a cystoscopy, or \"cysto\", which allowed your physician to learn more about your bladder (or to remove a stent placed after surgery). We suggest that you continue to avoid caffeine, fruit juice, and alcohol for the next 24 hours, however, you are encouraged to return to your normal activities.  ?  ?  A few things that are considered normal after your cystoscopy:  ?  * small amount of bleeding (or spotting) that clears within the next 24 hours  ?  * slight burning sensation with urination  ?  * sensation of needing to void (urinate) more frequently  ?  * the feeling of \"air\" in your urine  ?  * mild discomfort that is relieved with Tylenol    * bladder spasms  ?  ?  ?  Please contact our office promptly if you:  ?  * develop a fever above 101 degrees  ?  * are unable to urinate  ?  * develop bright red blood that does not stop  ?  * experience severe pain or swelling  ?  ?  ?  And of course, please contact our office with any concerns or questions [phone]  ?   AFTER YOUR CYSTOSCOPY        You have just completed a cystoscopy, or \"cysto\", which allowed your physician to learn more about your bladder (or to remove a stent placed after surgery). We suggest that you continue to avoid caffeine, fruit juice, and alcohol for the next 24 hours, however, you are encouraged to return to your normal activities.         A few things that are considered normal after your cystoscopy:     * Small amount of bleeding (or spotting) that clears within the next 24 hours     * Slight burning sensation with urination     * Sensation to of needing to avoid more frequently     * The feeling of \"air\" in your urine     * Mild discomfort that is relieved with Tylenol        Please contact our office promptly if you:     * Develop a fever above 101 degrees     * Are unable to urinate     * Develop bright red blood that does not stop     * Severe pain or swelling         Please contact our " office with any concerns or questions @[facility].

## 2022-04-13 NOTE — LETTER
"4/13/2022       RE: Lobo Isaacs  [address]  [facility] [address]     Dear Colleague,    Thank you for referring your patient, Lobo Isaacs, to the [facility] at [facility]. Please see a copy of my visit note below.    [facility]   CHIEF COMPLAINT   It was my pleasure to see Lobo Isaacs who is a 71 year old male for follow-up of BLADDER CANCER.      HPI   Lobo Isaacs is a very pleasant 71 year old male   Prior Dr. Torres patient - last seen 4/13/21:  \"Mr. Lobo Isaacs has a history of 6 years ago having a very large bladder tumor, which had to be resected and has since then been followed very closely.  The FISH test has been positive until very recently.  In addition to that, the cytology was atypical.  Because of this, even though the previous cystoscopies in the office had not shown any lesions in the bladder, we decided that we should do bladder biopsies and retrograde pyelograms to be sure there is no evidence of residual carcinoma in situ. We rebiopsy of the bladder 3 months ago.  Cystoscopy: no evidence of disease     10/13/21:  Follow-up today for surveillance cystoscopy  He has been doing very well with no changes in his urination  No hematuria    TODAY 4/13/2022   Follow-up today for surveillance cystoscopy  He has been doing well with no change in symptoms    PHYSICAL EXAM  Patient is a 71 year old  male   Vitals: Blood pressure (!) 140/84, pulse 68, height 1.74 m (5' 8.5\"), weight 59 kg (130 lb), SpO2 94 %.  Body mass index is 19.48 kg/m .  General Appearance Adult:   Alert, no acute distress, oriented  HENT: throat/mouth:normal, good dentition  Lungs: no respiratory distress, or pursed lip breathing  Heart: No obvious jugular venous distension present  Abdomen: soft, nontender, no organomegaly or masses  Musculoskeltal: extremities normal, no peripheral edema  Skin: no suspicious lesions or rashes  Neuro: " "Alert, oriented, speech and mentation normal  Psych: affect and mood normal  Gait: Normal  : penis, scrotum, testes normal    UA RESULTS:  Recent Labs   Lab Test 10/13/21  1313 03/18/20  0850 03/03/20  0805   COLOR Yellow   < > Yellow   APPEARANCE Clear   < > Clear   URINEGLC Negative   < > Negative   URINEBILI Negative   < > Negative   URINEKETONE Negative   < > Negative   SG 1.025   < > 1.015   UBLD Moderate*   < > Small*   URINEPH 5.5   < > 7.0   PROTEIN Negative   < > Negative   UROBILINOGEN 0.2   < > 0.2   NITRITE Negative   < > Negative   LEUKEST Negative   < > Negative   RBCU  --   --  O - 2   WBCU  --   --  0 - 5    < > = values in this interval not displayed.      Creatinine   Date Value Ref Range Status   01/15/2021 0.87 0.66 - 1.25 mg/dL Final     CYTOLOGY:  4/13/21:  Urine, voided:   Negative for High-Grade Urothelial Carcinoma.   Crystals present. Degenerated urothelial cells present.     11/18/20:  Urine, voided:   Atypical urothelial cells   Specimen Adequacy: Satisfactory for evaluation.    PATHOLOGY:  1/19/21:  FINAL DIAGNOSIS:   A: Urinary bladder, left lateral wall, biopsy:   - Urothelial proliferation of uncertain malignant potential.  See comment.   - Muscularis propria is not present.   - COMMENT: These lesions may be seen in isolation (usually in patients   with a prior history of papillary   urothelial carcinoma) or adjacent to low-grade papillary urothelial   neoplasms and may represent the \"shoulder\"   (lateral extension) of a papillary neoplasm.     B: Urinary bladder, anterior wall, biopsy:   - Negative for malignancy.   - Muscularis propria is not present.     C: Urinary bladder, right lateral wall, biopsy:   - Negative for malignancy.   - Muscularis propria is not present.     D: Urinary bladder, posterior wall, biopsy:   - Negative for malignancy.   - Chronic inflammation.   - Muscularis propria is not present.     E: Urinary bladder, trigone wall, biopsy:   - Negative for " malignancy.   - Muscularis propria is not present.     F: Prostatic urethra, biopsy:   - Negative for malignancy.   - Muscularis propria is not present.     3/7/18:  FINAL DIAGNOSIS:   A: Urinary bladder, anterior wall, biopsy: Negative for malignancy.     B: Urinary bladder, left lateral wall, biopsy: Negative for malignancy.     C: Urinary bladder, posterior wall, biopsy: Negative for malignancy.     D: Urinary bladder, tumor: Non-invasive low-grade papillary urothelial   carcinoma.  See synoptic summary.     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     IMAGING:  All pertinent imaging reviewed:    All imaging studies reviewed by me.  I personally reviewed these imaging films.  A formal report from radiology will follow.    CT ABD/PEL 3/5/18:  FINDINGS: There is slight high density of the renal pyramids  bilaterally, likely a variant of normal. No urinary tract calculi are  demonstrated. There is symmetric enhancement and excretion of contrast  from both kidneys. No evidence of solid renal mass. There is a filling  defect in the urinary bladder adjacent to the right ureterovesical  junction that measures 1.3 cm (series 7, image 70).     A small calcified gallstone is noted. No CT evidence of cholecystitis.  The liver, spleen, pancreas, and adrenal glands are unremarkable. No  abdominal or retroperitoneal adenopathy. No pelvic lymphadenopathy. No  free fluid in the pelvis. No suspicious bony lesions.                                                                      IMPRESSION:  1. There is a filling defect in the urinary bladder adjacent to the  right ureterovesical junction  that could represent a mass or a blood  clot. Correlate with cystoscopy. No other cause for hematuria  demonstrated.  2. Cholelithiasis.    ASSESSMENT and PLAN  71-year-old man with history of low-grade noninvasive bladder cancer with prior positive cytology.  Most recent biopsy on 1/19/2021 was negative for malignancy    Low-grade noninvasive bladder cancer  -Cystoscopy today with a few areas of erythema around prior biopsy sites.  This is stable from his last cystoscopy 6 months ago  -We discussed that I would like to see him back in 12 months for repeat cystoscopy  -I reviewed his prior pathology results, prior cytology results, and I reviewed his CT scan from 2018 and agree with radiologist interpretations.      Time spent: 20 minutes spent on the date of the encounter doing chart review, history and exam, documentation and further activities as noted above.  This was in addition to cystoscopy time    Kevin Robertson MD   Urology  [facility]  [facility] Phone: [phone]  [facility] Phone: [phone]          Again, thank you for allowing me to participate in the care of your patient.      Sincerely,    Kevin Robertson MD

## 2022-04-13 NOTE — NURSING NOTE
Chief Complaint   Patient presents with     bladder cancer (H)     In office cystoscopy today   Prior to the start of the procedure and with procedural staff participation, I verbally confirmed the patient s identity using two indicators, relevant allergies, that the procedure was appropriate and matched the consent or emergent situation, and that the correct equipment/implants were available. Immediately prior to starting the procedure I conducted the Time Out with the procedural staff and re-confirmed the patient s name, procedure, and site/side. I have wiped the patient off with the povidone-Iodine solution, draped them,  used Lidocaine hydrochloride jelly, and instilled sterile water into the bladder. (The Joint Commission universal protocol was followed.)  Yes    Sedation (Moderate or Deep): None  5mL 2% lidocaine hydrochloride Urojet instilled into urethra.    NDC# [address]-5  Lot #: [id number]  Expiration Date:  10-23  Luz Ramírez LPN

## 2022-04-13 NOTE — PROCEDURES
CYSTOSCOPY PROCEDURE NOTE:    Lobo Isaacs is a 71 year old male  who presents with h/o low grade bladder cancer for cystoscopy.    Pt ID verified with patient: Yes     Procedure verified with patient: Yes     Procedure confirmed with physician and support staff: Yes     Consent form confirmed with physician and support staff.    Sign In  History and Physical Exam reviewed .  Informed Consent Discussed: Yes   Sign in Communication: Yes   Time Out:  Team Confirms the Correct Patient, Correct Procedure; Yes , Correct Site and Site Marking, Correct Position (if applicable).    Affirmation of Time Out: Yes   Sign Out:  Sign Out Discussion: Yes   Physician: Kevin Robertson MD    A urinalysis was performed revealing no evidence of infection.    The benefits, risks, alternatives of the cystoscopy procedure and personnel were discussed with the patient. The verbal consent was obtained and the patient agrees to proceed.      Description of procedure:   After fully informed, voluntary consent was obtained, the patient was brought into the procedure room, identified and placed in a supine position on the cystoscopy table.  The groin/scrotum were prepped with betadine and draped in a sterile fashion.  Urojet lidocaine gel was introduced.  A 15F flexible cystoscope was inserted into the urethra, and the bladder and urethra wereexamined in a systematic manner.  The patient tolerated the procedure well and there were no complications.      Cystoscopic findings:  The urethra was normal without strictures.  The prostate was 3-4cm long and demonstrated mild bilobar hypertrophy.  There was no median lobe.  The external sphincter coapted normally and the bladder neck was normal. The bladder was  entered and careful pan endoscopy was carried out. The posterior, superior and lateral walls and dome of the bladder were all well visualized and the scope was retroflexed upon itself..  There was mild trabeculation.  There were no stones, or  diverticula identifed.  The ureteric orifices were normal in position and number and effluxing clear urine.  There was a small area of erythema on the left posterior wall just beyond an area of prior biopsy which was well-healed.  The other areas of biopsy were all well-healed.  There is also some abnormal appearing mucosa near the left bladder neck.  No distinct papillary lesions.  These areas appeared stable from his last cystoscopy    Assessment/Plan:   Lobo Isaacs is a 71 year old male with a history of low-grade noninvasive bladder cancer     -See clinic note      Kevin Robertson MD

## 2022-07-02 DIAGNOSIS — I10 HYPERTENSION GOAL BP (BLOOD PRESSURE) < 140/80: ICD-10-CM

## 2022-07-04 NOTE — TELEPHONE ENCOUNTER
Routing refill request to provider for review/approval because:  --Last visit:  1/15/21 Asa.  --VM reminder 3/25/22.  --Future Visit: Appears he is going to establish with Linda's provider on 7/21/22.

## 2022-07-05 RX ORDER — METOPROLOL SUCCINATE 50 MG/1
TABLET, EXTENDED RELEASE ORAL
Qty: 30 TABLET | Refills: 0 | Status: SHIPPED | OUTPATIENT
Start: 2022-07-05 | End: 2022-09-06

## 2022-07-21 ENCOUNTER — OFFICE VISIT (OUTPATIENT)
Dept: FAMILY MEDICINE | Facility: CLINIC | Age: 72
End: 2022-07-21
Payer: COMMERCIAL

## 2022-07-21 VITALS
HEART RATE: 67 BPM | SYSTOLIC BLOOD PRESSURE: 145 MMHG | HEIGHT: 69 IN | BODY MASS INDEX: 19.64 KG/M2 | DIASTOLIC BLOOD PRESSURE: 83 MMHG | OXYGEN SATURATION: 99 % | WEIGHT: 132.6 LBS | TEMPERATURE: 97.6 F

## 2022-07-21 DIAGNOSIS — Z00.00 ENCOUNTER FOR MEDICARE ANNUAL WELLNESS EXAM: Primary | ICD-10-CM

## 2022-07-21 DIAGNOSIS — Z11.59 NEED FOR HEPATITIS B SCREENING TEST: ICD-10-CM

## 2022-07-21 DIAGNOSIS — Z23 HIGH PRIORITY FOR 2019-NCOV VACCINE: ICD-10-CM

## 2022-07-21 LAB — HBA1C MFR BLD: 5.7 % (ref 0–5.6)

## 2022-07-21 PROCEDURE — 87340 HEPATITIS B SURFACE AG IA: CPT | Performed by: STUDENT IN AN ORGANIZED HEALTH CARE EDUCATION/TRAINING PROGRAM

## 2022-07-21 PROCEDURE — 36415 COLL VENOUS BLD VENIPUNCTURE: CPT | Performed by: STUDENT IN AN ORGANIZED HEALTH CARE EDUCATION/TRAINING PROGRAM

## 2022-07-21 PROCEDURE — G0439 PPPS, SUBSEQ VISIT: HCPCS | Mod: GC | Performed by: STUDENT IN AN ORGANIZED HEALTH CARE EDUCATION/TRAINING PROGRAM

## 2022-07-21 PROCEDURE — 86706 HEP B SURFACE ANTIBODY: CPT | Performed by: STUDENT IN AN ORGANIZED HEALTH CARE EDUCATION/TRAINING PROGRAM

## 2022-07-21 PROCEDURE — 80061 LIPID PANEL: CPT | Performed by: STUDENT IN AN ORGANIZED HEALTH CARE EDUCATION/TRAINING PROGRAM

## 2022-07-21 PROCEDURE — 83036 HEMOGLOBIN GLYCOSYLATED A1C: CPT | Performed by: STUDENT IN AN ORGANIZED HEALTH CARE EDUCATION/TRAINING PROGRAM

## 2022-07-21 PROCEDURE — 0054A COVID-19,PF,PFIZER (12+ YRS): CPT | Performed by: STUDENT IN AN ORGANIZED HEALTH CARE EDUCATION/TRAINING PROGRAM

## 2022-07-21 PROCEDURE — 91305 COVID-19,PF,PFIZER (12+ YRS): CPT | Performed by: STUDENT IN AN ORGANIZED HEALTH CARE EDUCATION/TRAINING PROGRAM

## 2022-07-21 PROCEDURE — 87389 HIV-1 AG W/HIV-1&-2 AB AG IA: CPT | Performed by: STUDENT IN AN ORGANIZED HEALTH CARE EDUCATION/TRAINING PROGRAM

## 2022-07-21 PROCEDURE — 86803 HEPATITIS C AB TEST: CPT | Performed by: STUDENT IN AN ORGANIZED HEALTH CARE EDUCATION/TRAINING PROGRAM

## 2022-07-21 ASSESSMENT — ENCOUNTER SYMPTOMS
ABDOMINAL PAIN: 0
HEARTBURN: 0
HEMATOCHEZIA: 0
WEAKNESS: 0
DIZZINESS: 0
HEMATURIA: 0
ARTHRALGIAS: 0
NAUSEA: 0
FREQUENCY: 0
NERVOUS/ANXIOUS: 0
CHILLS: 0
DIARRHEA: 0
JOINT SWELLING: 0
EYE PAIN: 0
SHORTNESS OF BREATH: 0
MYALGIAS: 0
DYSURIA: 0
PARESTHESIAS: 0
COUGH: 0
SORE THROAT: 0
PALPITATIONS: 0
HEADACHES: 0
CONSTIPATION: 0
FEVER: 0

## 2022-07-21 ASSESSMENT — ACTIVITIES OF DAILY LIVING (ADL): CURRENT_FUNCTION: NO ASSISTANCE NEEDED

## 2022-07-21 NOTE — PATIENT INSTRUCTIONS
Scheduling:  If you had an XRay/CT/Ultrasound/MRI ordered the number is [phone] to schedule or change your radiology appointment.   [facility] MEDICARE PERSONAL PREVENTIVE SERVICES PLAN - SERVICES  For Men   Review these tests with your doctor then decide which ones you want and take this page home for your reference     Immunization History   Administered Date(s) Administered     Influenza (High Dose) 3 valent vaccine 02/18/2015, 09/04/2016, 12/07/2017, 11/09/2018     Influenza (IIV3) PF 12/27/2012, 11/01/2013     Influenza Vaccine IM > 6 months Valent IIV4 (Alfuria,Fluzone) 08/26/2019     Influenza, Quad, High Dose, Pf, 65yr+ (Fluzone HD) 09/21/2020, 10/27/2021     Pneumo Conj 13-V (2010&after) 02/18/2015     Pneumococcal 23 valent 02/10/2014     TDAP Vaccine (Adacel) 12/27/2012                                                       IMMUNIZATIONS Description Recommend today?     Influenza (flu shot) Helps to prevent flu; you should get it every year No: is not indicated today.   PCV 13 Prevents pneumonia; given once No; is up to date.   PPSV 23 Prevents pneumonia; given once No; is up to date.   Tetanus Prevents tetanus; need every 10 years No; is up to date.  Due: 12/27/2022   Herpes Zoster (shingles) Prevents or lessens symptoms from shingles; given once.  If you want this vaccine please go to a pharmacy to receive Shinrix   Hepatitis B  If you have any of the following risk factors you should be immunized for hepatitis B: severe kidney disease, people who live in the same house as a carrier of Hepatitis B virus, people who live in  institutions (e.g. nursing homes or group homes), homosexual men, patients with hemophilia who received Factor VIII or IX concentrates, abusers of illicit injectable drugs No: is not indicated today.     Health Maintenance   Topic Date Due     HEPATITIS C SCREENING  Never done     ZOSTER IMMUNIZATION (1 of 2) Never done     LUNG CANCER SCREENING  Never done     FALL RISK ASSESSMENT   03/03/2021     MEDICARE ANNUAL WELLNESS VISIT  10/22/2021     COVID-19 Vaccine (4 - Booster for Pfizer series) 05/06/2022     INFLUENZA VACCINE (1) 09/01/2022     DTAP/TDAP/TD IMMUNIZATION (2 - Td or Tdap) 12/27/2022     COLORECTAL CANCER SCREENING  03/28/2024     LIPID  03/03/2025     ADVANCE CARE PLANNING  10/27/2025     PHQ-2 (once per calendar year)  Completed     Pneumococcal Vaccine: 65+ Years  Completed     AORTIC ANEURYSM SCREENING (SYSTEM ASSIGNED)  Completed     IPV IMMUNIZATION  Aged Out     MENINGITIS IMMUNIZATION  Aged Out     HEPATITIS B IMMUNIZATION  Aged Out         SCREENING TESTS     Description   Year of Last Screening   Recommended Today?   Heart disease screening blood tests    Cholesterol level Reducing cholesterol can reduce your risk of heart attacks by 25%.  Screening is recommended yearly if you are at risk of heart disease otherwise every 4-5 years  Yes; Recommended and ordered.     Diabetes screening tests    Hemoglobin A1c blood test   Finding and treating diabetes early can reduce complications.  Screening recommended/covered yearly if you have high blood pressure, high cholesterol, obesity (BMI >30), or a history of high blood glucose tests; or 2 of the following: family history of diabetes, overweight (BMI >25 but <30), age 65 years or older, and a history of diabetes of pregnancy or gave birth to baby weighing more than 9 lbs.    Yes; Recommended and ordered.   Hepatitis B screening Finding hepatitis B early can reduce complications.  Screening is recommended for persons with selected risk factors.  Yes; Recommended and ordered.   Hepatitis C screening Finding hepatitis C early can reduce complications.  Screening is recommended for all persons born from 1945 through 1965 and for those with selected other risk factors.   Yes; Recommended and ordered.   HIV screening Finding HIV early can reduce complications.  Screening is recommended for persons with risk factors for HIV infection.   Yes; Recommended and ordered.   Glaucoma screening Early detection of glaucoma can prevent blindness.   Please talk to your eye doctor about this.       SCREENING TESTS     Description   Year of Last Screening   Recommended Today?   Colorectal cancer screening    Fecal occult blood test     Screening colonoscopy Screening for colon cancer has been shown to reduce death from colon cancer by 25-30%. Screening recommended to start at 50 years and continuing until age 75 years.    No; is up to date.   Screening for Lung Cancer     Low-dose CT scanning Screening can reduce mortality in persons aged 55-80 who have smoked at least 30 pack-years and who are either still smoking or have quit in the past 15 years.  No: is not indicated today.   Abdominal Aortic Aneurysm (AAA) screening    Ultrasound (US)   An aneurysm treated before rupture is very safe -a ruptured aneurysm can be fatal.  Screening  by US for AAA is limited to patients who meet one of the following criteria:    Men who are 65-75 years old and have smoked more than 100 cigarettes in their lifetime    Anyone with a family history of abdominal aortic aneurysm  Recommeded and patient declined.      MEDICARE WELLNESS EXAM PATIENT INSTRUCTIONS    Yearly exam:     See your health care provider every year in order to review changes in your health, review medicines that you take, and discuss preventive care needs such as immunizations and cancer screening.    Get a flu shot each year.     Advance Directive:    If you have not done so, you are encouraged to complete an advance directive. If you would like support with this, please contact the clinic  through the main clinic line. More information about advance directives can be found at:     [email or web address]    Nutrition:     Eat at least 5 servings of fruits and vegetables each day.     Eat whole-grain bread, whole-wheat pasta and brown rice instead of  white grains and rice.     Talk to your doctor about Calcium and Vitamin D.     Lifestyle:    Exercise for at least 150 minutes a week (30 minutes a day, 5 days a week). This will help you control your weight and prevent disease.     Limit alcohol to one drink per day.     If you smoke, try to quit - your doctor will be happy to help.     Wear sunscreen to prevent skin cancer.     See your dentist every six months for an exam and cleaning.     See your eye doctor every 1 to 2 years to screen for conditions such as glaucoma, macular degeneration and cataracts.      Patient Education     Understanding USDA MyPlate  The USDA has guidelines to help you make healthy food choices. These are called MyPlate. MyPlate shows the food groups that make up healthy meals using the image of a place setting. Before you eat, think about the healthiest choices for what to put on your plate or in your cup or bowl. To learn more about building a healthy plate, visit www.Pegg'dmyplate.gov.    The food groups    Fruits. Any fruit or 100% fruit juice counts as part of the Fruit Group. Fruits may be fresh, canned, frozen, or dried, and may be whole, cut-up, or pureed. Make 1/2 of your plate fruits and vegetables.    Vegetables. Any vegetable or 100% vegetable juice counts as a member of the Vegetable Group. Vegetables may be fresh, frozen, canned, or dried. They can be served raw or cooked and may be whole, cut-up, or mashed. Make 1/2 of your plate fruits and vegetables.    Grains. All foods made from grains are part of the Grains Group. These include wheat, rice, oats, cornmeal, and barley. Grains are often used to make foods such as bread, pasta, oatmeal, cereal, tortillas, and grits. Grains should be no more than 1/4 of your plate. At least half of your grains should be whole grains.    Protein. This group includes meat, poultry, seafood, beans and peas, eggs, processed soy products (such as tofu), nuts (including nut butters), and  seeds. Make protein choices no more than 1/4 of your plate. Meat and poultry choices should be lean or low fat.    Dairy. The Dairy Group includes all fluid milk products and foods made from milk that contain calcium, such as yogurt and cheese. (Foods that have little calcium, such as cream, butter, and cream cheese, are not part of this group.) Most dairy choices should be low-fat or fat-free.    Oils. Oils aren't a food group, but they do contain essential nutrients. However it's important to watch your intake of oils. These are fats that are liquid at room temperature. They include canola, corn, olive, soybean, vegetable, and sunflower oil. Foods that are mainly oil include mayonnaise, certain salad dressings, and soft margarines. You likely already get your daily oil allowance from the foods you eat.  Things to limit  Eating healthy also means limiting these things in your diet:       Salt (sodium). Many processed foods have a lot of sodium. To keep sodium intake down, eat fresh vegetables, meats, poultry, and seafood when possible. Purchase low-sodium, reduced-sodium, or no-salt-added food products at the store. And don't add salt to your meals at home. Instead, season them with herbs and spices such as dill, oregano, cumin, and paprika. Or try adding flavor with lemon or lime zest and juice.    Saturated fat. Saturated fats are most often found in animal products such as beef, pork, and chicken. They are often solid at room temperature, such as butter. To reduce your saturated fat intake, choose leaner cuts of meat and poultry. And try healthier cooking methods such as grilling, broiling, roasting, or baking. For a simple lower-fat swap, use plain nonfat yogurt instead of mayonnaise when making potato salad or macaroni salad.    Added sugars. These are sugars added to foods. They are in foods such as ice cream, candy, soda, fruit drinks, sports drinks, energy drinks, cookies, pastries, jams, and syrups. Cut  down on added sugars by sharing sweet treats with a family member or friend. You can also choose fruit for dessert, and drink water or other unsweetened beverages.     [place] last reviewed this educational content on 6/1/2020 2000-2021 [employer]. All rights reserved. This information is not intended as a substitute for professional medical care. Always follow your healthcare professional's instructions.

## 2022-07-21 NOTE — LETTER
July 22, 2022      Lobo P Ferny  [address]  [facility] [address]        Dear ,    We are writing to inform you of your test results.    Your hemoglobin A1c which is a surrogate measure of your average blood sugar over 3 months is slightly elevated at 5.7 this puts you on the border of having prediabetes versus a normal blood sugar.  I recommend adjusting your diet to avoid sugary or processed foods and continuing with your current exercise regimen.     Otherwise you do not appear to be immune to hepatitis B and so we would be happy to offer you the series of vaccines if you make a follow-up appointment for this by calling our  at the number above.    The rest of your blood work is normal.    Resulted Orders   Hemoglobin A1c   Result Value Ref Range    Hemoglobin A1C 5.7 (H) 0.0 - 5.6 %      Comment:      Normal <5.7%   Prediabetes 5.7-6.4%    Diabetes 6.5% or higher     Note: Adopted from ADA consensus guidelines.   HIV Antigen Antibody Combo   Result Value Ref Range    HIV Antigen Antibody Combo Nonreactive Nonreactive      Comment:      HIV-1 p24 Ag & HIV-1/HIV-2 Ab Not Detected   Hepatitis B Surface Antibody   Result Value Ref Range    Hepatitis B Surface Antibody 0.00 <8.00 m[IU]/mL      Comment:      Nonreactive, No antibody detected when the value is less than 8.00 mIU/mL.   Hepatitis B surface antigen   Result Value Ref Range    Hepatitis B Surface Antigen Nonreactive Nonreactive   Hepatitis C antibody   Result Value Ref Range    Hepatitis C Antibody Nonreactive Nonreactive    Narrative    Assay performance characteristics have not been established for newborns, infants, and children.       If you have any questions or concerns, please call the clinic at the number listed above.       Sincerely,      Willem Tobias MD

## 2022-07-21 NOTE — PROGRESS NOTES
"SUBJECTIVE:   Lobo Isaacs is a 71 year old male who presents for Preventive Visit.      Patient has been advised of split billing requirements and indicates understanding: Yes  Are you in the first 12 months of your Medicare coverage?  No    Healthy Habits:     In general, how would you rate your overall health?  Good    Frequency of exercise:  None    Do you usually eat at least 4 servings of fruit and vegetables a day, include whole grains    & fiber and avoid regularly eating high fat or \"junk\" foods?  No    Taking medications regularly:  Yes    Medication side effects:  Not applicable    Ability to successfully perform activities of daily living:  No assistance needed    Home Safety:  No safety concerns identified    Hearing Impairment:  No hearing concerns    In the past 6 months, have you been bothered by leaking of urine?  No    In general, how would you rate your overall mental or emotional health?  Good      PHQ-2 Total Score: 0    Additional concerns today:  No    Do you feel safe in your environment? Yes    Have you ever done Advance Care Planning? (For example, a Health Directive, POLST, or a discussion with a medical provider or your loved ones about your wishes): Yes, patient states has an Advance Care Planning document and will bring a copy to the clinic.    Fall risk  Fallen 2 or more times in the past year?: No  Any fall with injury in the past year?: No    Cognitive Screening   1) Repeat 3 items (Leader, Season, Table)    2) Clock draw: NORMAL  3) 3 item recall: Recalls 3 objects  Results: 3 items recalled: COGNITIVE IMPAIRMENT LESS LIKELY    Mini-CogTM Copyright JAY Yang. Licensed by the author for use in [facility]; reprinted with permission (elvin@.[facility]). All rights reserved.      Do you have sleep apnea, excessive snoring or daytime drowsiness?: patient states to not know but infroms that his wife has told him that he has sleep apnea when sleeping on his back    Reviewed and " updated as needed this visit by clinical staff   Tobacco  Allergies  Meds   Med Hx  Surg Hx  Fam Hx  Soc Hx          Reviewed and updated as needed this visit by Provider                   Social History     Tobacco Use     Smoking status: Former Smoker     Smokeless tobacco: Never Used     Tobacco comment: SMOKED BRIEFLY IN HIS 20'S   Substance Use Topics     Alcohol use: No     If you drink alcohol do you typically have >3 drinks per day or >7 drinks per week? No    Alcohol Use 7/21/2022   Prescreen: >3 drinks/day or >7 drinks/week? Not Applicable   Prescreen: >3 drinks/day or >7 drinks/week? -   No flowsheet data found.        Current providers sharing in care for this patient include:   Patient Care Team:  Tito Bray PA-C as Assigned PCP  Kevin Robertson MD as Assigned Surgical Provider    The following health maintenance items are reviewed in Epic and correct as of today:  Health Maintenance Due   Topic Date Due     HEPATITIS C SCREENING  Never done     ZOSTER IMMUNIZATION (1 of 2) Never done     LUNG CANCER SCREENING  Never done     MEDICARE ANNUAL WELLNESS VISIT  10/22/2021     COVID-19 Vaccine (4 - Booster for Pfizer series) 05/06/2022     Lab work is in process        Review of Systems   Constitutional: Negative for chills and fever.   HENT: Positive for hearing loss. Negative for congestion, ear pain and sore throat.    Eyes: Positive for visual disturbance. Negative for pain.   Respiratory: Negative for cough and shortness of breath.    Cardiovascular: Negative for chest pain, palpitations and peripheral edema.   Gastrointestinal: Negative for abdominal pain, constipation, diarrhea, heartburn, hematochezia and nausea.   Genitourinary: Negative for dysuria, frequency, genital sores, hematuria, impotence, penile discharge and urgency.   Musculoskeletal: Negative for arthralgias, joint swelling and myalgias.   Skin: Negative for rash.   Neurological: Negative for dizziness, weakness, headaches and  "paresthesias.   Psychiatric/Behavioral: Negative for mood changes. The patient is not nervous/anxious.      Constitutional, HEENT, cardiovascular, pulmonary, GI, , musculoskeletal, neuro, skin, endocrine and psych systems are negative, except as otherwise noted.    OBJECTIVE:   BP (!) 145/83   Pulse 67   Temp 97.6  F (36.4  C) (Oral)   Ht 1.74 m (5' 8.5\")   Wt 60.1 kg (132 lb 9.6 oz)   SpO2 99%   BMI 19.87 kg/m   Estimated body mass index is 19.87 kg/m  as calculated from the following:    Height as of this encounter: 1.74 m (5' 8.5\").    Weight as of this encounter: 60.1 kg (132 lb 9.6 oz).  Physical Exam  GENERAL: healthy, alert and no distress  EYES: Eyes grossly normal to inspection, PERRL and conjunctivae and sclerae normal  HENT: ear canals with significant cerumen and unable to fully visualize TM's, nose and mouth without ulcers or lesions  NECK: no adenopathy, no asymmetry, masses, or scars and thyroid normal to palpation  RESP: lungs clear to auscultation - no rales, rhonchi or wheezes  CV: regular rate and rhythm, normal S1 S2, no S3 or S4, no murmur, click or rub, no peripheral edema and peripheral pulses strong  ABDOMEN: soft, nontender, no hepatosplenomegaly, no masses and bowel sounds normal  MS: no gross musculoskeletal defects noted, no edema  SKIN: no suspicious lesions or rashes  NEURO: Normal strength and tone, mentation intact and speech normal  PSYCH: mentation appears normal, affect normal/bright    Diagnostic Test Results:  Previous Labs reviewed in Epic  Current labs pending    ASSESSMENT / PLAN:   Lobo was seen today for new patient, wellness visit and imm/inj.    Diagnoses and all orders for this visit:    Encounter for Medicare annual wellness exam  Patient is due for lipid and hemoglobin testing today based on history of hypertension and hyperlipidemia.  He also excepted HIV hep C in hep B testing per routine.  Patient did decline AAA screening.  -     Lipid panel  -     " "Hemoglobin A1c  -     HIV Antigen Antibody Combo  -     Hepatitis C antibody    Need for hepatitis B screening test  No documented history of hepatitis B vaccines.  We will screen for immunity and reassess need for vaccination as appropriate.  -     Hepatitis B Surface Antibody  -     Hepatitis B surface antigen    High priority for 2019-nCoV vaccine  -     COVID-19,PF,PFIZER (12+ Yrs GRAY LABEL)        COUNSELING:  Reviewed preventive health counseling, as reflected in patient instructions       Consider AAA screening for ages 65-75 and smoking history       Regular exercise       Healthy diet/nutrition       Vision screening       Hepatitis C screening    Estimated body mass index is 19.87 kg/m  as calculated from the following:    Height as of this encounter: 1.74 m (5' 8.5\").    Weight as of this encounter: 60.1 kg (132 lb 9.6 oz).  We discussed patient's concern of low weight BMI is normal today however we did discuss healthy eating habits and continued her current exercise regimen.  If patient is interested he can also start with supplements such as Ensure to help with weight concerns.  Otherwise he does not eat 3 meals a day and I would start here.      He reports that he has quit smoking. He has never used smokeless tobacco.      Appropriate preventive services were discussed with this patient, including applicable screening as appropriate for cardiovascular disease, diabetes, osteopenia/osteoporosis, and glaucoma.  As appropriate for age/gender, discussed screening for colorectal cancer, prostate cancer, breast cancer, and cervical cancer. Checklist reviewing preventive services available has been given to the patient.    Reviewed patients plan of care and provided an AVS. The Basic Care Plan (routine screening as documented in Health Maintenance) for Lobo meets the Care Plan requirement. This Care Plan has been established and reviewed with the Patient.      Willem Tobias MD  [facility] " [facility]    Identified Health Risks:    The patient was counseled and encouraged to consider modifying their diet and eating habits. He was provided with information on recommended healthy diet options.

## 2022-07-22 LAB
HBV SURFACE AB SERPL IA-ACNC: 0 M[IU]/ML
HBV SURFACE AG SERPL QL IA: NONREACTIVE
HCV AB SERPL QL IA: NONREACTIVE
HIV 1+2 AB+HIV1 P24 AG SERPL QL IA: NONREACTIVE

## 2022-07-23 LAB
CHOLEST SERPL-MCNC: 185 MG/DL
HDLC SERPL-MCNC: 73 MG/DL
LDLC SERPL CALC-MCNC: 102 MG/DL
NONHDLC SERPL-MCNC: 112 MG/DL
TRIGL SERPL-MCNC: 48 MG/DL

## 2022-07-25 NOTE — PROGRESS NOTES
Preceptor Attestation:   Patient seen, evaluated and discussed with the resident. I have verified the content of the note, which accurately reflects my assessment of the patient and the plan of care.   Supervising Physician:  Senia Chavez, DO

## 2022-08-25 DIAGNOSIS — I10 HYPERTENSION GOAL BP (BLOOD PRESSURE) < 140/80: ICD-10-CM

## 2022-08-25 NOTE — TELEPHONE ENCOUNTER
[facility] phone call message- patient requesting a refill:    Full Medication Name: metoprolol succinate ER (TOPROL XL) 50 MG 24 hr tablet    Dose: TAKE 1 TABLET BY MOUTH EVERY DAY    Pharmacy confirmed as   [facility] [address] [place] [facility] [address] [facility]  [address] [facility] [address]  Phone: [phone] Fax: [phone]  : Yes    Additional Comments: Patient physical 7/21/2022 dr Tobias. Pt Requested refill of medication. Refill only a month. Typically gets a 90 day supply. Pt will be out of meds soon.     OK to leave a message on voice mail? Yes    Primary language: English      needed? No    Call taken on August 25, 2022 at 10:05 AM by Arlin Ga

## 2022-09-06 ENCOUNTER — TELEPHONE (OUTPATIENT)
Dept: FAMILY MEDICINE | Facility: CLINIC | Age: 72
End: 2022-09-06

## 2022-09-06 DIAGNOSIS — I10 HYPERTENSION GOAL BP (BLOOD PRESSURE) < 140/80: ICD-10-CM

## 2022-09-06 RX ORDER — METOPROLOL SUCCINATE 50 MG/1
50 TABLET, EXTENDED RELEASE ORAL DAILY
Qty: 30 TABLET | Refills: 0 | Status: SHIPPED | OUTPATIENT
Start: 2022-09-06 | End: 2022-09-13

## 2022-09-06 NOTE — TELEPHONE ENCOUNTER
A 30 day refill was sent today 9/6/22. Patient is requesting that the order be changed to be 90d.    Jacquelyn Shearer RN

## 2022-09-06 NOTE — TELEPHONE ENCOUNTER

## 2022-09-06 NOTE — TELEPHONE ENCOUNTER
[facility] phone call message- patient requesting a refill:    Full Medication Name: metoprolol succinate ER (TOPROL XL) 50 MG 24 hr tablet      Pharmacy confirmed as     [facility] [address] [place] [facility] - [address]  [address] [facility] [address]  Phone: [phone] Fax: [phone]    : Yes    Additional Comments: The patient states he's always gotten the 90 day refill on this medication. The is requesting a 90 refill.    OK to leave a message on voice mail? Yes    Primary language: English      needed? No    Call taken on September 6, 2022 at 4:08 PM by Dolores Fonseca

## 2022-09-06 NOTE — TELEPHONE ENCOUNTER
Patient called back and said that the pharmacy still has not received this prescription. Patient will be out of medication in 4 days and would like to  the refill as soon as possible.

## 2022-09-13 RX ORDER — METOPROLOL SUCCINATE 50 MG/1
50 TABLET, EXTENDED RELEASE ORAL DAILY
Qty: 90 TABLET | Refills: 0 | Status: SHIPPED | OUTPATIENT
Start: 2022-09-13 | End: 2022-12-30

## 2022-10-31 ENCOUNTER — OFFICE VISIT (OUTPATIENT)
Dept: FAMILY MEDICINE | Facility: CLINIC | Age: 72
End: 2022-10-31
Payer: COMMERCIAL

## 2022-10-31 VITALS
SYSTOLIC BLOOD PRESSURE: 133 MMHG | BODY MASS INDEX: 19.34 KG/M2 | WEIGHT: 130.6 LBS | DIASTOLIC BLOOD PRESSURE: 74 MMHG | OXYGEN SATURATION: 99 % | HEIGHT: 69 IN | HEART RATE: 56 BPM | TEMPERATURE: 98.6 F

## 2022-10-31 DIAGNOSIS — E78.00 PURE HYPERCHOLESTEROLEMIA: ICD-10-CM

## 2022-10-31 DIAGNOSIS — Z23 NEED FOR VACCINATION: ICD-10-CM

## 2022-10-31 DIAGNOSIS — Z23 NEED FOR PROPHYLACTIC VACCINATION AND INOCULATION AGAINST INFLUENZA: ICD-10-CM

## 2022-10-31 DIAGNOSIS — Z23 HIGH PRIORITY FOR 2019-NCOV VACCINE: ICD-10-CM

## 2022-10-31 DIAGNOSIS — I10 HYPERTENSION GOAL BP (BLOOD PRESSURE) < 140/80: Primary | ICD-10-CM

## 2022-10-31 DIAGNOSIS — R73.03 PREDIABETES: ICD-10-CM

## 2022-10-31 PROCEDURE — 0124A COVID-19,PF,PFIZER BOOSTER BIVALENT: CPT | Performed by: STUDENT IN AN ORGANIZED HEALTH CARE EDUCATION/TRAINING PROGRAM

## 2022-10-31 PROCEDURE — 90662 IIV NO PRSV INCREASED AG IM: CPT | Performed by: STUDENT IN AN ORGANIZED HEALTH CARE EDUCATION/TRAINING PROGRAM

## 2022-10-31 PROCEDURE — G0010 ADMIN HEPATITIS B VACCINE: HCPCS | Performed by: STUDENT IN AN ORGANIZED HEALTH CARE EDUCATION/TRAINING PROGRAM

## 2022-10-31 PROCEDURE — 91312 COVID-19,PF,PFIZER BOOSTER BIVALENT: CPT | Performed by: STUDENT IN AN ORGANIZED HEALTH CARE EDUCATION/TRAINING PROGRAM

## 2022-10-31 PROCEDURE — 90746 HEPB VACCINE 3 DOSE ADULT IM: CPT | Performed by: STUDENT IN AN ORGANIZED HEALTH CARE EDUCATION/TRAINING PROGRAM

## 2022-10-31 PROCEDURE — G0008 ADMIN INFLUENZA VIRUS VAC: HCPCS | Performed by: STUDENT IN AN ORGANIZED HEALTH CARE EDUCATION/TRAINING PROGRAM

## 2022-10-31 PROCEDURE — 99214 OFFICE O/P EST MOD 30 MIN: CPT | Mod: 25 | Performed by: STUDENT IN AN ORGANIZED HEALTH CARE EDUCATION/TRAINING PROGRAM

## 2022-10-31 NOTE — PROGRESS NOTES
Assessment & Plan     Hypertension goal BP (blood pressure) < 140/80  Recheck of blood pressure at goal today at 133/74.  Patient reports good compliance of metoprolol at home and no concerns.     Pure hypercholesterolemia  Reviewed very minimally elevated LDL of 102 at last physical.  Patient reports good compliance with atorvastatin 10 mg.  If this continues to be elevated at next check I would consider increasing atorvastatin.    Prediabetes  Informed patient that A1c was 5.7 today.  No need to start medication for this.  We did discuss healthy diet and exercise.  Overall patient is good about avoiding sugary foods with occasional indulgence.  Plan to recheck in 6 to 12 months.    For all the above issues we reviewed healthy diet and exercise which patient is overall very good about.  No drastic changes today.  Plan to review and recheck the above at next physical in 6 to 12 months or sooner as needed.    Return in about 9 months (around 7/31/2023) for Wellness/Physcial Visit.    Willem Tobias MD  [facility] ARIA Diamond is a 72 year old, presenting for the following health issues:  RECHECK (Pt is here to follow up on blood pressure and lab results)      HPI   Patient overall feeling well today with no concerns.  Patient is interested in getting all of her vaccines as above.    Patient reports good compliance with metoprolol 50 mg which she has been on for quite some time and reports that blood pressures at home are similar to how they are today.  He denies any shortness of breath or chest pain.  He reports most of his exercises being yardwork throughout the entirety of the day and is able to get through this without difficulty.  He also reports not having very much artificial sugar with occasional indulgence of sugar and rare pop.  Otherwise reports that relatively healthy diet with a variety of fruits and vegetables.    Review of Systems   Constitutional, HEENT,  "cardiovascular, pulmonary, gi and gu systems are negative, except as otherwise noted.      Objective    /74   Pulse 56   Temp 98.6  F (37  C) (Oral)   Ht 1.756 m (5' 9.13\")   Wt 59.2 kg (130 lb 9.6 oz)   SpO2 99%   BMI 19.21 kg/m    Body mass index is 19.21 kg/m .  Physical Exam  Vitals reviewed.   Constitutional:       General: He is not in acute distress.     Appearance: Normal appearance. He is not ill-appearing.   HENT:      Head: Normocephalic and atraumatic.   Eyes:      Conjunctiva/sclera: Conjunctivae normal.   Cardiovascular:      Rate and Rhythm: Normal rate and regular rhythm.      Heart sounds: No murmur heard.  Pulmonary:      Effort: Pulmonary effort is normal. No respiratory distress.      Breath sounds: Normal breath sounds.   Musculoskeletal:         General: No deformity. Normal range of motion.   Skin:     General: Skin is warm and dry.   Neurological:      General: No focal deficit present.      Mental Status: He is alert.      Motor: No weakness.      Gait: Gait normal.   Psychiatric:         Behavior: Behavior normal.         Thought Content: Thought content normal.          Office Visit on 07/21/2022   Component Date Value Ref Range Status     Hemoglobin A1C 07/21/2022 5.7 (H)  0.0 - 5.6 % Final    Normal <5.7%   Prediabetes 5.7-6.4%    Diabetes 6.5% or higher     Note: Adopted from ADA consensus guidelines.     HIV Antigen Antibody Combo 07/21/2022 Nonreactive  Nonreactive Final    HIV-1 p24 Ag & HIV-1/HIV-2 Ab Not Detected     Hepatitis B Surface Antibody Instr* 07/21/2022 0.00  <8.00 m[IU]/mL Final    Nonreactive, No antibody detected when the value is less than 8.00 mIU/mL.     Hepatitis B Surface Antigen 07/21/2022 Nonreactive  Nonreactive Final     Hepatitis C Antibody 07/21/2022 Nonreactive  Nonreactive Final     Cholesterol 07/21/2022 185  <200 mg/dL Final     Triglycerides 07/21/2022 48  <150 mg/dL Final     Direct Measure HDL 07/21/2022 73  >=40 mg/dL Final     LDL " Cholesterol Calculated 07/21/2022 102 (H)  <=100 mg/dL Final     Non HDL Cholesterol 07/21/2022 112  <130 mg/dL Final

## 2023-02-13 ENCOUNTER — TELEPHONE (OUTPATIENT)
Dept: FAMILY MEDICINE | Facility: CLINIC | Age: 73
End: 2023-02-13
Payer: COMMERCIAL

## 2023-02-13 NOTE — TELEPHONE ENCOUNTER
"RN spoke to the patient \"stated that I felt weakness then I test my self home Covid test became positive and would like a treatment medication call (PAXLOVID) his wife took it and she is ok now\".  RN informed the patient if he gets  worse go to the  Emergency Department or call 911.        Pauline Schmidt RN    "

## 2023-02-13 NOTE — TELEPHONE ENCOUNTER
Patient has not had labs drawn to check kidney and liver for 2 years. Scheduled for virtual provider visit on 2/14/23    Jacquelyn Shearer RN

## 2023-02-13 NOTE — TELEPHONE ENCOUNTER
COVID Positive/Requesting COVID treatment    Patient is positive for COVID and requesting treatment options.    Date of positive COVID test (PCR or at home)? 2/13/23  Current COVID symptoms: cough, headache, sore throat and diarrhea  Date COVID symptoms began: 2/13/2023    Message should be routed to clinic RN pool. Best phone number to use for call back: [phone]

## 2023-02-13 NOTE — CONFIDENTIAL NOTE
"RN spoke to the patient and informed that will  Have video  call tomorrow regarding his Covid positive,Patient stated \"does not know how to use a video,but phone call    Now I have change to phone call.  Pauline Schmidt RN        "

## 2023-02-14 ENCOUNTER — VIRTUAL VISIT (OUTPATIENT)
Dept: FAMILY MEDICINE | Facility: CLINIC | Age: 73
End: 2023-02-14
Payer: COMMERCIAL

## 2023-02-14 DIAGNOSIS — Z23 HIGH PRIORITY FOR 2019-NCOV VACCINE: ICD-10-CM

## 2023-02-14 DIAGNOSIS — U07.1 INFECTION DUE TO 2019 NOVEL CORONAVIRUS: Primary | ICD-10-CM

## 2023-02-14 PROCEDURE — 99214 OFFICE O/P EST MOD 30 MIN: CPT | Mod: 95 | Performed by: FAMILY MEDICINE

## 2023-02-14 NOTE — PROGRESS NOTES
Lobo is a 72 year old who is being evaluated via a billable telephone visit.      What phone number would you like to be contacted at? regular  How would you like to obtain your AVS? Mail a copy    Distant Location (provider location):  On-site    Assessment & Plan     Covid 19 infection/High priority for 2019-nCoV vaccine  Became sick a couple of days ago with sore throat, body aches, etc.  Tested positive yesterday. Wife had covid and took paxlovid and patient would like to do the same.  Reviewed meds.  Will have him stop Atorvastin for 10 days. Reviewed last kidney function which was normal. Urged to check pulse ox.  Wife is a nurse and will do this.  Questions answered. Will send med and urged him to start right away.  If O2 goes below 94% then urged to go to the hospital.    - nirmatrelvir and ritonavir (PAXLOVID) therapy pack; Take 3 tablets by mouth 2 times daily for 5 days   :[id number]}         No follow-ups on file.    Morales Avila MD  [facility] 72 [facility], presenting for the following health issues:  No chief complaint on file.      HPI     See discussions above      Objective           Vitals:  No vitals were obtained today due to virtual visit.    Physical Exam   healthy, alert and no distress  PSYCH: Alert and oriented times 3; coherent speech, normal   rate and volume, able to articulate logical thoughts, able   to abstract reason, no tangential thoughts, no hallucinations   or delusions  His affect is normal  RESP: No cough, no audible wheezing, able to talk in full sentences  Remainder of exam unable to be completed due to telephone visits    Office Visit on 07/21/2022   Component Date Value Ref Range Status     Hemoglobin A1C 07/21/2022 5.7 (H)  0.0 - 5.6 % Final    Normal <5.7%   Prediabetes 5.7-6.4%    Diabetes 6.5% or higher     Note: Adopted from ADA consensus guidelines.     HIV Antigen Antibody Combo 07/21/2022 Nonreactive  Nonreactive Final     HIV-1 p24 Ag & HIV-1/HIV-2 Ab Not Detected     Hepatitis B Surface Antibody Instr* 07/21/2022 0.00  <8.00 m[IU]/mL Final    Nonreactive, No antibody detected when the value is less than 8.00 mIU/mL.     Hepatitis B Surface Antigen 07/21/2022 Nonreactive  Nonreactive Final     Hepatitis C Antibody 07/21/2022 Nonreactive  Nonreactive Final     Cholesterol 07/21/2022 185  <200 mg/dL Final     Triglycerides 07/21/2022 48  <150 mg/dL Final     Direct Measure HDL 07/21/2022 73  >=40 mg/dL Final     LDL Cholesterol Calculated 07/21/2022 102 (H)  <=100 mg/dL Final     Non HDL Cholesterol 07/21/2022 112  <130 mg/dL Final               Phone call duration: 22 minutes

## 2023-11-17 ENCOUNTER — OFFICE VISIT (OUTPATIENT)
Dept: FAMILY MEDICINE | Facility: CLINIC | Age: 73
End: 2023-11-17
Payer: COMMERCIAL

## 2023-11-17 VITALS
HEART RATE: 66 BPM | WEIGHT: 131 LBS | DIASTOLIC BLOOD PRESSURE: 89 MMHG | BODY MASS INDEX: 19.4 KG/M2 | RESPIRATION RATE: 16 BRPM | HEIGHT: 69 IN | SYSTOLIC BLOOD PRESSURE: 138 MMHG | OXYGEN SATURATION: 99 %

## 2023-11-17 DIAGNOSIS — C67.9 MALIGNANT NEOPLASM OF URINARY BLADDER, UNSPECIFIED SITE (H): ICD-10-CM

## 2023-11-17 DIAGNOSIS — Z00.00 ENCOUNTER FOR MEDICARE ANNUAL WELLNESS EXAM: Primary | ICD-10-CM

## 2023-11-17 DIAGNOSIS — R73.03 PREDIABETES: ICD-10-CM

## 2023-11-17 DIAGNOSIS — Z23 NEED FOR TDAP VACCINATION: ICD-10-CM

## 2023-11-17 DIAGNOSIS — Z29.11 NEED FOR VACCINATION AGAINST RESPIRATORY SYNCYTIAL VIRUS: ICD-10-CM

## 2023-11-17 LAB — HBA1C MFR BLD: 5.6 % (ref 0–5.6)

## 2023-11-17 PROCEDURE — 90480 ADMN SARSCOV2 VAC 1/ONLY CMP: CPT

## 2023-11-17 PROCEDURE — 90662 IIV NO PRSV INCREASED AG IM: CPT

## 2023-11-17 PROCEDURE — G0008 ADMIN INFLUENZA VIRUS VAC: HCPCS

## 2023-11-17 PROCEDURE — 91320 SARSCV2 VAC 30MCG TRS-SUC IM: CPT

## 2023-11-17 PROCEDURE — G0439 PPPS, SUBSEQ VISIT: HCPCS | Mod: GC

## 2023-11-17 PROCEDURE — 36415 COLL VENOUS BLD VENIPUNCTURE: CPT

## 2023-11-17 PROCEDURE — 90472 IMMUNIZATION ADMIN EACH ADD: CPT

## 2023-11-17 PROCEDURE — 83036 HEMOGLOBIN GLYCOSYLATED A1C: CPT

## 2023-11-17 PROCEDURE — 90715 TDAP VACCINE 7 YRS/> IM: CPT

## 2023-11-17 RX ORDER — RESPIRATORY SYNCYTIAL VIRUS VACCINE 120MCG/0.5
0.5 KIT INTRAMUSCULAR ONCE
Qty: 1 EACH | Refills: 0 | Status: SHIPPED | OUTPATIENT
Start: 2023-11-17 | End: 2023-11-17

## 2023-11-17 ASSESSMENT — ENCOUNTER SYMPTOMS
HEADACHES: 0
EYE PAIN: 0
PALPITATIONS: 0
FREQUENCY: 0
ABDOMINAL PAIN: 0
CONSTIPATION: 0
SORE THROAT: 0
SHORTNESS OF BREATH: 0
CHILLS: 0
FEVER: 0
DYSURIA: 0
DIARRHEA: 0
NAUSEA: 0
DIZZINESS: 0
JOINT SWELLING: 0
HEARTBURN: 0
HEMATURIA: 0
HEMATOCHEZIA: 0
COUGH: 0
WEAKNESS: 0
PARESTHESIAS: 0
ARTHRALGIAS: 0
NERVOUS/ANXIOUS: 0
MYALGIAS: 0

## 2023-11-17 ASSESSMENT — ACTIVITIES OF DAILY LIVING (ADL): CURRENT_FUNCTION: NO ASSISTANCE NEEDED

## 2023-11-17 NOTE — PROGRESS NOTES
"SUBJECTIVE:   Lobo is a 73 year old, presenting for the following:  Physical (Pt here for physical)        11/17/2023    10:46 AM   Additional Questions   Roomed by Michael   Accompanied by Self         11/17/2023    10:46 AM   Patient Reported Additional Medications   Patient reports taking the following new medications n/a       Are you in the first 12 months of your Medicare coverage?  No    Healthy Habits:     In general, how would you rate your overall health?  Good    Frequency of exercise:  2-3 days/week    Duration of exercise:  15-30 minutes    Do you usually eat at least 4 servings of fruit and vegetables a day, include whole grains    & fiber and avoid regularly eating high fat or \"junk\" foods?  No    Taking medications regularly:  Yes    Medication side effects:  None    Ability to successfully perform activities of daily living:  No assistance needed    Home Safety:  No safety concerns identified    Hearing Impairment:  No hearing concerns    In the past 6 months, have you been bothered by leaking of urine?  No    In general, how would you rate your overall mental or emotional health?  Good    Additional concerns today:  No      Today's PHQ-2 Score:       11/17/2023    10:51 AM   PHQ-2 ( 1999 Pf[employer])   Q1: Little interest or pleasure in doing things 0   Q2: Feeling down, depressed or hopeless 0   PHQ-2 Score 0   Q1: Little interest or pleasure in doing things Not at all   Q2: Feeling down, depressed or hopeless Not at all   PHQ-2 Score 0           Have you ever done Advance Care Planning? (For example, a Health Directive, POLST, or a discussion with a medical provider or your loved ones about your wishes): Yes, advance care planning is on file. Health directives have updated, states he would not want CPR/intubation. New ACP given to take home to fill out and return.       Fall risk  Fallen 2 or more times in the past year?: No  Any fall with injury in the past year?: No  click delete button to remove " this line now  Cognitive Screening   1) Repeat 3 items (Leader, Season, Table)    2) Clock draw: NORMAL  3) 3 item recall: Recalls 3 objects  Results: 3 items recalled: COGNITIVE IMPAIRMENT LESS LIKELY    Mini-CogTM Copyright S Celia. Licensed by the author for use in [facility]; reprinted with permission (elvin@[facility]). All rights reserved.      Do you have sleep apnea, excessive snoring or daytime drowsiness? : no    Reviewed and updated as needed this visit by clinical staff   Tobacco  Allergies  Meds              Reviewed and updated as needed this visit by Provider                 Social History     Tobacco Use    Smoking status: Former    Smokeless tobacco: Never    Tobacco comments:     SMOKED BRIEFLY IN HIS 20'S under pack per day   Substance Use Topics    Alcohol use: No             11/17/2023    10:55 AM   Alcohol Use   Prescreen: >3 drinks/day or >7 drinks/week? No     Do you have a current opioid prescription? No  Do you use any other controlled substances or medications that are not prescribed by a provider? None    Quit smoking when 27, had been smoking 7 years,less than a pack a day    Has a BP cuff at home, usually in the 120s to low 130      Current providers sharing in care for this patient include:   Patient Care Team:  Maria Antonia Linares MD as PCP - General (Family Medicine)  Maria Antonia Linares MD as Assigned PCP    The following health maintenance items are reviewed in Epic and correct as of today:  Health Maintenance   Topic Date Due    LUNG CANCER SCREENING  Never done    RSV VACCINE (Pregnancy & 60+) (1 - 1-dose 60+ series) Never done    DTAP/TDAP/TD IMMUNIZATION (2 - Td or Tdap) 12/27/2022    MEDICARE ANNUAL WELLNESS VISIT  07/21/2023    INFLUENZA VACCINE (1) 09/01/2023    COVID-19 Vaccine (6 - 2023-24 season) 09/01/2023    ZOSTER IMMUNIZATION (2 of 2) 12/29/2022    COLORECTAL CANCER SCREENING  03/28/2024    FALL RISK ASSESSMENT  11/17/2024    LIPID  07/21/2027    ADVANCE CARE  "PLANNING  07/25/2027    HEPATITIS C SCREENING  Completed    PHQ-2 (once per calendar year)  Completed    Pneumococcal Vaccine: 65+ Years  Completed    AORTIC ANEURYSM SCREENING (SYSTEM ASSIGNED)  Completed    IPV IMMUNIZATION  Aged Out    HPV IMMUNIZATION  Aged Out    MENINGITIS IMMUNIZATION  Aged Out    RSV MONOCLONAL ANTIBODY  Aged Out     Family history reviewed and updated    Lab work is in process      Review of Systems   Constitutional:  Negative for chills and fever.   HENT:  Negative for congestion, ear pain, hearing loss and sore throat.    Eyes:  Negative for pain and visual disturbance.   Respiratory:  Negative for cough and shortness of breath.    Cardiovascular:  Negative for chest pain, palpitations and peripheral edema.   Gastrointestinal:  Negative for abdominal pain, constipation, diarrhea, heartburn, hematochezia and nausea.   Genitourinary:  Negative for dysuria, frequency, genital sores, hematuria, impotence, penile discharge and urgency.   Musculoskeletal:  Negative for arthralgias, joint swelling and myalgias.   Skin:  Negative for rash.   Neurological:  Negative for dizziness, weakness, headaches and paresthesias.   Psychiatric/Behavioral:  Negative for mood changes. The patient is not nervous/anxious.        OBJECTIVE:   /89 (BP Location: Left arm, Patient Position: Sitting, Cuff Size: Adult Large)   Pulse 66   Resp 16   Ht 1.753 m (5' 9\")   Wt 59.4 kg (131 lb)   SpO2 99%   BMI 19.35 kg/m   Estimated body mass index is 19.35 kg/m  as calculated from the following:    Height as of this encounter: 1.753 m (5' 9\").    Weight as of this encounter: 59.4 kg (131 lb).  Physical Exam  GENERAL: healthy, alert and no distress  EYES: Eyes grossly normal to inspection, PERRL and conjunctivae and sclerae normal  HENT: ear canals and TM's normal, nose and mouth without ulcers or lesions  NECK: no adenopathy, no asymmetry, masses, or scars and thyroid normal to palpation  RESP: lungs clear to " auscultation - no rales, rhonchi or wheezes  CV: regular rate and rhythm, normal S1 S2, no S3 or S4, no murmur, click or rub, no peripheral edema and peripheral pulses strong  ABDOMEN: soft, nontender, no hepatosplenomegaly, no masses and bowel sounds normal  MS: no gross musculoskeletal defects noted, no edema  SKIN: no suspicious lesions or rashes  NEURO: Normal strength and tone, mentation intact and speech normal  PSYCH: mentation appears normal, affect normal/bright    Diagnostic Test Results:  Labs reviewed in Epic    ASSESSMENT / PLAN:   (Z00.00) Encounter for Medicare annual wellness exam  (primary encounter diagnosis)  Comment: Overall doing well. No balance/fall issues, meeting ADLs iADLs with ease. Lives at home with his wife. Active around the house and with gardening. Shared decision making regarding RSV vaccine including benefits of decreased morbidity/mortality with RSV infection, risk of vaccine reaction, molina barre syndrome, and new vaccine. Declines AAA screening. Reports when on the 20mg atorvastatin in the past, had trouble with chest wall muscles, went down to 10mg and it resolved.  Plan: Tdap, tetanus-diptheria-acell pertussis,         (BOOSTRIX) 5-2.5-18.5 LF-MCG/0.5 PINKY         injection, respiratory syncytial virus vaccine,        bivalent (ABRYSVO) injection          (Z23) Need for Tdap vaccination  Comment: due for booster, will administer today    (R73.03) Prediabetes  Comment: Last A1c 5.7 in July 2022, prediabetes level. Will recheck today  Plan: Hemoglobin A1c          (C67.9) Malignant neoplasm of urinary bladder, unspecified site (H)  Comment:  Seeing urology for hx of bladder cancer, following up regularly per their plan. Stable.  Plan: Continue follow up with urology    COUNSELING:  Reviewed preventive health counseling, as reflected in patient instructions       Consider AAA screening for ages 65-75 and smoking history       Regular exercise       Healthy diet/nutrition        Fall risk prevention        He reports that he has quit smoking. He has never used smokeless tobacco.      Appropriate preventive services were discussed with this patient, including applicable screening as appropriate for fall prevention, nutrition, physical activity, Tobacco-use cessation, weight loss and cognition.  Checklist reviewing preventive services available has been given to the patient.    Reviewed patients plan of care and provided an AVS. The Basic Care Plan (routine screening as documented in Health Maintenance) for Lobo meets the Care Plan requirement. This Care Plan has been established and reviewed with the Patient.          Chaparrita Jefferson MD  [facility]    Identified Health Risks:  I have reviewed Opioid Use Disorder and Substance Use Disorder risk factors and made any needed referrals. The patient was counseled and encouraged to consider modifying their diet and eating habits. He was provided with information on recommended healthy diet options.

## 2023-11-17 NOTE — LETTER
November 17, 2023      Lobo RUIZ Ferny  [address] [facility] [id number]        Dear ,    We are writing to inform you of your test results.    Your test results fall within the expected range(s) or remain unchanged from previous results.  Please continue with current treatment plan.    Resulted Orders   Hemoglobin A1c   Result Value Ref Range    Hemoglobin A1C 5.6 0.0 - 5.6 %      Comment:      Normal <5.7%   Prediabetes 5.7-6.4%    Diabetes 6.5% or higher     Note: Adopted from ADA consensus guidelines.       If you have any questions or concerns, please call the clinic at the number listed above.       Sincerely,      Chaparrita Jefferson MD

## 2023-11-17 NOTE — PATIENT INSTRUCTIONS
Patient Education   Personalized Prevention Plan  You are due for the preventive services outlined below.  Your care team is available to assist you in scheduling these services.  If you have already completed any of these items, please share that information with your care team to update in your medical record.  Health Maintenance Due   Topic Date Due     LUNG CANCER SCREENING  Never done     RSV VACCINE (Pregnancy & 60+) (1 - 1-dose 60+ series) Never done     Diptheria Tetanus Pertussis (DTAP/TDAP/TD) Vaccine (2 - Td or Tdap) 12/27/2022     Flu Vaccine (1) 09/01/2023     COVID-19 Vaccine (6 - 2023-24 season) 09/01/2023     Zoster (Shingles) Vaccine (2 of 2) 12/29/2022     Learning About Dietary Guidelines  What are the Dietary Guidelines for Americans?     Dietary Guidelines for Americans provide tips for eating well and staying healthy. This helps reduce the risk for long-term (chronic) diseases.  These guidelines recommend that you:  Eat and drink the right amount for you. The U.[place] government's food guide is called MyPlate. It can help you make your own well-balanced eating plan.  Try to balance your eating with your activity. This helps you stay at a healthy weight.  Drink alcohol in moderation, if at all.  Limit foods high in salt, saturated fat, trans fat, and added sugar.  These guidelines are from the U.S. Department of Agriculture and the U.S. Department of Health and Human Services. They are updated every 5 years.  What is MyPlate?  MyPlate is the U.S. government's food guide. It can help you make your own well-balanced eating plan. A balanced eating plan means that you eat enough, but not too much, and that your food gives you the nutrients you need to stay healthy.  MyPlate focuses on eating plenty of whole grains, fruits, and vegetables, and on limiting fat and sugar. It is available online at www.ChooseMyPlate.gov.  How can you get started?  If you're trying to eat healthier, you can slowly change  "your eating habits over time. You don't have to make big changes all at once. Start by adding one or two healthy foods to your meals each day.  Grains  Choose whole-grain breads and cereals and whole-wheat pasta and whole-grain crackers.  Vegetables  Eat a variety of vegetables every day. They have lots of nutrients and are part of a heart-healthy diet.  Fruits  Eat a variety of fruits every day. Fruits contain lots of nutrients. Choose fresh fruit instead of fruit juice.  Protein foods  Choose fish and lean poultry more often. Eat red meat and fried meats less often. Dried beans, tofu, and nuts are also good sources of protein.  Dairy  Choose low-fat or fat-free products from this food group. If you have problems digesting milk, try eating cheese or yogurt instead.  Fats and oils  Limit fats and oils if you're trying to cut calories. Choose healthy fats when you cook. These include canola oil and olive oil.  Where can you learn more?  Go to https://www.[employer].net/patiented  Enter D676 in the search box to learn more about \"Learning About Dietary Guidelines.\"  Current as of: February 28, 2023               Content Version: 13.8    6109-7932 [employer].   Care instructions adapted under license by your healthcare professional. If you have questions about a medical condition or this instruction, always ask your healthcare professional. [employer] disclaims any warranty or liability for your use of this information.         "

## 2023-11-17 NOTE — PROGRESS NOTES
"SUBJECTIVE:   Lobo is a 73 year old, presenting for the following:  Physical (Pt here for physical)        11/17/2023    10:46 AM   Additional Questions   Roomed by Michael   Accompanied by Self         11/17/2023    10:46 AM   Patient Reported Additional Medications   Patient reports taking the following new medications n/a       Are you in the first 12 months of your Medicare coverage?  { :599654}    Healthy Habits:     In general, how would you rate your overall health?  Good    Frequency of exercise:  2-3 days/week    Duration of exercise:  15-30 minutes    Do you usually eat at least 4 servings of fruit and vegetables a day, include whole grains    & fiber and avoid regularly eating high fat or \"junk\" foods?  No    Taking medications regularly:  Yes    Medication side effects:  None    Ability to successfully perform activities of daily living:  No assistance needed    Home Safety:  No safety concerns identified    Hearing Impairment:  No hearing concerns    In the past 6 months, have you been bothered by leaking of urine?  No    In general, how would you rate your overall mental or emotional health?  Good    Additional concerns today:  No      Today's PHQ-2 Score:       11/17/2023    10:51 AM   PHQ-2 ( 1999 Pfizer)   Q1: Little interest or pleasure in doing things 0   Q2: Feeling down, depressed or hopeless 0   PHQ-2 Score 0   Q1: Little interest or pleasure in doing things Not at all   Q2: Feeling down, depressed or hopeless Not at all   PHQ-2 Score 0           Have you ever done Advance Care Planning? (For example, a Health Directive, POLST, or a discussion with a medical provider or your loved ones about your wishes): { :719058}    {Hearing Test Done (Optional):643714}   Fall risk  Fallen 2 or more times in the past year?: No  Any fall with injury in the past year?: No  {If any of the above assessments are answered yes, consider ordering appropriate referrals (Optional):793110}  Cognitive Screening   1) " "Repeat 3 items (Leader, Season, Table)  {Get patient's attention, then say, \"I am going to say three words that I want you to remember now and later.  The words are Leader, Season, and Table.  Please say them for me now.\"  If pt. unable after 3 tries, go to next item}  2) Clock draw: {Say the following phrases in order: \"Please draw a clock.  Start by drawing a large [place].  Put all the numbers in the [place] and set the hands to show 11:10 (10 past 11).\"  If pt cannot complete in 3 minutes, stop and ask for recall items.  \"NORMAL\" test = all twelve numbers in correct location, and clock hands correctly designating 11:10}{ :27655::\"NORMAL\"}  3) 3 item recall: {Say: \"What were the three words I asked you to remember?\"}{ :196846}  Results: {MINICOG RESULTS:859918}    Mini-CogTM Copyright JAY Yang. Licensed by the author for use in [facility]; reprinted with permission ([email or web address]). All rights reserved.      {Do you have sleep apnea, excessive snoring or daytime drowsiness? (Optional):616286}    Reviewed and updated as needed this visit by clinical staff   Tobacco  Allergies  Meds              Reviewed and updated as needed this visit by Provider                 Social History     Tobacco Use    Smoking status: Former    Smokeless tobacco: Never    Tobacco comments:     SMOKED BRIEFLY IN HIS 20'S under pack per day   Substance Use Topics    Alcohol use: No     {Rooming staff  Click this link to complete the Prescreen if response below is not for today's visit  Alcohol Use Prescreen >3 drinks/day or > 7 drinks/week.  If the prescreen question answer is YES, complete the full AUDIT  :896231}        11/17/2023    10:55 AM   Alcohol Use   Prescreen: >3 drinks/day or >7 drinks/week? No   {add AUDIT responses (Optional) (A score of 7 for adult men is an indication of hazardous drinking; a score of 8 or more is an indication of an alcohol use disorder.  A score of 7 or more for adult women is an indication " of hazardous drinking or an alchohol use disorder):408334}  Do you have a current opioid prescription? { :372717}  Do you use any other controlled substances or medications that are not prescribed by a provider? {Substance Use :561325}  {Provider  If there are gaps in the social history shown above, please follow the link and refresh the note Link to Social and Substance History :130403}    {Outside tests to abstract? (Optional):595063}    {additional problems to add (Optional):768736}    Current providers sharing in care for this patient include: {Rooming staff:  Please update Care Team from storyboard, refresh this note and then delete this statement}  Patient Care Team:  Maria Antonia Linares MD as PCP - General (Family Medicine)  Maria Antonia Linares MD as Assigned PCP    The following health maintenance items are reviewed in Epic and correct as of today:  Health Maintenance   Topic Date Due    LUNG CANCER SCREENING  Never done    RSV VACCINE (Pregnancy & 60+) (1 - 1-dose 60+ series) Never done    DTAP/TDAP/TD IMMUNIZATION (2 - Td or Tdap) 12/27/2022    MEDICARE ANNUAL WELLNESS VISIT  07/21/2023    INFLUENZA VACCINE (1) 09/01/2023    COVID-19 Vaccine (6 - 2023-24 season) 09/01/2023    ZOSTER IMMUNIZATION (2 of 2) 12/29/2022    COLORECTAL CANCER SCREENING  03/28/2024    FALL RISK ASSESSMENT  11/17/2024    LIPID  07/21/2027    ADVANCE CARE PLANNING  07/25/2027    HEPATITIS C SCREENING  Completed    PHQ-2 (once per calendar year)  Completed    Pneumococcal Vaccine: 65+ Years  Completed    AORTIC ANEURYSM SCREENING (SYSTEM ASSIGNED)  Completed    IPV IMMUNIZATION  Aged Out    HPV IMMUNIZATION  Aged Out    MENINGITIS IMMUNIZATION  Aged Out    RSV MONOCLONAL ANTIBODY  Aged Out     {Chronicprobdata (optional):986801}  {Decision Support (Optional):221705}        Review of Systems   Constitutional:  Negative for chills and fever.   HENT:  Negative for congestion, ear pain, hearing loss and sore throat.    Eyes:  Negative for pain and  "visual disturbance.   Respiratory:  Negative for cough and shortness of breath.    Cardiovascular:  Negative for chest pain, palpitations and peripheral edema.   Gastrointestinal:  Negative for abdominal pain, constipation, diarrhea, heartburn, hematochezia and nausea.   Genitourinary:  Negative for dysuria, frequency, genital sores, hematuria, impotence, penile discharge and urgency.   Musculoskeletal:  Negative for arthralgias, joint swelling and myalgias.   Skin:  Negative for rash.   Neurological:  Negative for dizziness, weakness, headaches and paresthesias.   Psychiatric/Behavioral:  Negative for mood changes. The patient is not nervous/anxious.      {ROS COMP (Optional):757364}    OBJECTIVE:   /89 (BP Location: Left arm, Patient Position: Sitting, Cuff Size: Adult Large)   Pulse 66   Resp 16   Ht 1.753 m (5' 9\")   Wt 59.4 kg (131 lb)   SpO2 99%   BMI 19.35 kg/m   Estimated body mass index is 19.35 kg/m  as calculated from the following:    Height as of this encounter: 1.753 m (5' 9\").    Weight as of this encounter: 59.4 kg (131 lb).  Physical Exam  {Exam (Optional) :558090}    {Diagnostic Test Results (Optional):273677}    ASSESSMENT / PLAN:   {Diag Picklist:459282}    {Patient advised of split billing (Optional):432947}      COUNSELING:  {Medicare Counselin}        He reports that he has quit smoking. He has never used smokeless tobacco.      Appropriate preventive services were discussed with this patient, including applicable screening as appropriate for fall prevention, nutrition, physical activity, Tobacco-use cessation, weight loss and cognition.  Checklist reviewing preventive services available has been given to the patient.    Reviewed patients plan of care and provided an AVS. The {CarePlan:601672} for Lobo meets the Care Plan requirement. This Care Plan has been established and reviewed with the {PATIENT, FAMILY MEMBER, CAREGIVER:936154}.    {Counseling Resources  [place] " Preventive Services Task Force  Cholesterol Screening  Health diet/nutrition  Pooled Cohorts Equation Calculator  [place]'s MyPlate  ASA Prophylaxis  Lung CA Screening  Osteoporosis prevention/bone health :007145}  {Prostate Cancer Screening  Consider for men 55-69 per guidance from USPSTF :584939}    Chaparrita Jefferson MD  [facility]    Identified Health Risks:  {Medicare required documentation of substance and opioid use disorders screening :949472}

## 2023-12-20 DIAGNOSIS — I10 HYPERTENSION GOAL BP (BLOOD PRESSURE) < 140/80: ICD-10-CM

## 2023-12-20 NOTE — TELEPHONE ENCOUNTER
"Request for medication refill:    metoprolol succinate ER (TOPROL XL) 50 MG 24 hr tablet       Providers if patient needs an appointment and you are willing to give a one month supply please refill for one month and  send a letter/MyChart using \".SMILLIMITEDREFILL\" .smillimited and route chart to \"P SMI \" (Giving one month refill in non controlled medications is strongly recommended before denial)    If refill has been denied, meaning absolutely no refills without visit, please complete the smart phrase \".smirxrefuse\" and route it to the \"P SMI MED REFILLS\"  pool to inform the patient and the pharmacy.    Dana Chen MA      "

## 2023-12-21 RX ORDER — METOPROLOL SUCCINATE 50 MG/1
TABLET, EXTENDED RELEASE ORAL
Qty: 90 TABLET | Refills: 3 | Status: SHIPPED | OUTPATIENT
Start: 2023-12-21

## 2023-12-23 DIAGNOSIS — E78.00 PURE HYPERCHOLESTEROLEMIA: ICD-10-CM

## 2023-12-26 RX ORDER — ATORVASTATIN CALCIUM 10 MG/1
TABLET, FILM COATED ORAL
Qty: 90 TABLET | Refills: 3 | Status: SHIPPED | OUTPATIENT
Start: 2023-12-26 | End: 2024-01-18

## 2023-12-26 NOTE — TELEPHONE ENCOUNTER
"Request for medication refill:  atorvastatin (LIPITOR) 10 MG tablet     Providers if patient needs an appointment and you are willing to give a one month supply please refill for one month and  send a letter/MyChart using \".SMILLIMITEDREFILL\" .smillimited and route chart to \"P SMI \" (Giving one month refill in non controlled medications is strongly recommended before denial)    If refill has been denied, meaning absolutely no refills without visit, please complete the smart phrase \".smirxrefuse\" and route it to the \"P SMI MED REFILLS\"  pool to inform the patient and the pharmacy.    Michael Gan, CMA      "

## 2023-12-28 DIAGNOSIS — E78.00 PURE HYPERCHOLESTEROLEMIA: ICD-10-CM

## 2023-12-28 RX ORDER — ATORVASTATIN CALCIUM 10 MG/1
TABLET, FILM COATED ORAL
Qty: 90 TABLET | Refills: 3 | OUTPATIENT
Start: 2023-12-28

## 2023-12-28 NOTE — TELEPHONE ENCOUNTER
Duplicate refill request for atorvastatin. Prescription sent to pharmacy on 12/26/23. Will refuse duplicate refill request.   Anay Anthony RN

## 2024-01-02 DIAGNOSIS — E78.00 PURE HYPERCHOLESTEROLEMIA: ICD-10-CM

## 2024-01-03 RX ORDER — ATORVASTATIN CALCIUM 10 MG/1
TABLET, FILM COATED ORAL
Qty: 90 TABLET | Refills: 3 | OUTPATIENT
Start: 2024-01-03

## 2024-01-12 DIAGNOSIS — E78.00 PURE HYPERCHOLESTEROLEMIA: ICD-10-CM

## 2024-01-12 RX ORDER — ATORVASTATIN CALCIUM 10 MG/1
TABLET, FILM COATED ORAL
Qty: 90 TABLET | Refills: 3 | OUTPATIENT
Start: 2024-01-12

## 2024-01-18 RX ORDER — ATORVASTATIN CALCIUM 10 MG/1
10 TABLET, FILM COATED ORAL DAILY
Qty: 90 TABLET | Refills: 3 | Status: SHIPPED | OUTPATIENT
Start: 2024-01-18

## 2024-10-10 ENCOUNTER — OFFICE VISIT (OUTPATIENT)
Dept: URGENT CARE | Facility: URGENT CARE | Age: 74
End: 2024-10-10
Payer: COMMERCIAL

## 2024-10-10 VITALS
RESPIRATION RATE: 14 BRPM | DIASTOLIC BLOOD PRESSURE: 76 MMHG | HEART RATE: 65 BPM | SYSTOLIC BLOOD PRESSURE: 134 MMHG | TEMPERATURE: 97.8 F | OXYGEN SATURATION: 98 %

## 2024-10-10 DIAGNOSIS — L03.113 CELLULITIS OF RIGHT UPPER EXTREMITY: Primary | ICD-10-CM

## 2024-10-10 PROCEDURE — 99214 OFFICE O/P EST MOD 30 MIN: CPT | Performed by: PHYSICIAN ASSISTANT

## 2024-10-10 RX ORDER — CEPHALEXIN 500 MG/1
500 CAPSULE ORAL 3 TIMES DAILY
Qty: 30 CAPSULE | Refills: 0 | Status: SHIPPED | OUTPATIENT
Start: 2024-10-10 | End: 2024-10-20

## 2024-10-10 NOTE — PROGRESS NOTES
Assessment & Plan     Cellulitis of right upper extremity  -treat with keflex x 10d  -recommend monitoring the red area, discussed alarm signs and symptoms to monitor for and discussed when to be reevaluated in the UC or ED   - cephALEXin (KEFLEX) 500 MG capsule  Dispense: 30 capsule; Refill: 0         No follow-ups on file.    Carey Wade PA-C  [facility] [place]    Tyrell Diamond is a 73 year old male who presents to clinic today for the following health issues:  Chief Complaint   Patient presents with    Urgent Care    Infection     Patient presents with an infection on his lower right arm.        HPI    -small red bump on the right forearm yesterday, woke up today with red [place] surrounding the bump  -area is itchy, mildly painful  -no fevers, no body aches.  Feeling ok otherwise    Review of Systems  Constitutional, HEENT, cardiovascular, pulmonary, gi and gu systems are negative, except as otherwise noted.      Objective    /76 (BP Location: Right arm)   Pulse 65   Temp 97.8  F (36.6  C) (Oral)   Resp 14   SpO2 98%   Physical Exam   GENERAL: alert and no distress  NECK: no adenopathy, no asymmetry, masses, or scars  RESP: lungs clear to auscultation - no rales, rhonchi or wheezes  CV: regular rate and rhythm, normal S1 S2, no S3 or S4, no murmur, click or rub, no peripheral edema  SKIN: right volar forearm with small red papule, surrounding erythema of 3cm.  No induration, no fluctuation.  CMS intact distally.

## 2024-11-20 ENCOUNTER — OFFICE VISIT (OUTPATIENT)
Dept: FAMILY MEDICINE | Facility: CLINIC | Age: 74
End: 2024-11-20
Payer: COMMERCIAL

## 2024-11-20 VITALS
SYSTOLIC BLOOD PRESSURE: 169 MMHG | OXYGEN SATURATION: 99 % | RESPIRATION RATE: 12 BRPM | WEIGHT: 132 LBS | HEIGHT: 69 IN | DIASTOLIC BLOOD PRESSURE: 74 MMHG | BODY MASS INDEX: 19.55 KG/M2 | HEART RATE: 64 BPM | TEMPERATURE: 97.6 F

## 2024-11-20 DIAGNOSIS — E78.00 PURE HYPERCHOLESTEROLEMIA: ICD-10-CM

## 2024-11-20 DIAGNOSIS — E78.5 HYPERLIPIDEMIA WITH TARGET LDL LESS THAN 130: ICD-10-CM

## 2024-11-20 DIAGNOSIS — I10 HYPERTENSION GOAL BP (BLOOD PRESSURE) < 140/80: ICD-10-CM

## 2024-11-20 DIAGNOSIS — Z00.00 ENCOUNTER FOR MEDICARE ANNUAL WELLNESS EXAM: Primary | ICD-10-CM

## 2024-11-20 DIAGNOSIS — Z12.11 SCREEN FOR COLON CANCER: ICD-10-CM

## 2024-11-20 LAB
ALBUMIN UR-MCNC: NEGATIVE MG/DL
APPEARANCE UR: CLEAR
BACTERIA #/AREA URNS HPF: ABNORMAL /HPF
BILIRUB UR QL STRIP: NEGATIVE
COLOR UR AUTO: YELLOW
GLUCOSE UR STRIP-MCNC: NEGATIVE MG/DL
HGB UR QL STRIP: ABNORMAL
KETONES UR STRIP-MCNC: NEGATIVE MG/DL
LEUKOCYTE ESTERASE UR QL STRIP: NEGATIVE
NITRATE UR QL: NEGATIVE
PH UR STRIP: 6.5 [PH] (ref 5–8)
RBC #/AREA URNS AUTO: ABNORMAL /HPF
SP GR UR STRIP: 1.01 (ref 1–1.03)
SQUAMOUS #/AREA URNS AUTO: ABNORMAL /LPF
UROBILINOGEN UR STRIP-ACNC: 0.2 E.U./DL
WBC #/AREA URNS AUTO: ABNORMAL /HPF

## 2024-11-20 PROCEDURE — 36415 COLL VENOUS BLD VENIPUNCTURE: CPT

## 2024-11-20 PROCEDURE — 82043 UR ALBUMIN QUANTITATIVE: CPT

## 2024-11-20 PROCEDURE — 80048 BASIC METABOLIC PNL TOTAL CA: CPT

## 2024-11-20 PROCEDURE — 99214 OFFICE O/P EST MOD 30 MIN: CPT | Mod: 25

## 2024-11-20 PROCEDURE — 80061 LIPID PANEL: CPT

## 2024-11-20 PROCEDURE — 82570 ASSAY OF URINE CREATININE: CPT

## 2024-11-20 PROCEDURE — 81001 URINALYSIS AUTO W/SCOPE: CPT

## 2024-11-20 PROCEDURE — G0439 PPPS, SUBSEQ VISIT: HCPCS | Mod: GC

## 2024-11-20 RX ORDER — VALSARTAN 80 MG/1
80 TABLET ORAL DAILY
Qty: 90 TABLET | Refills: 1 | Status: SHIPPED | OUTPATIENT
Start: 2024-11-20

## 2024-11-20 RX ORDER — ATORVASTATIN CALCIUM 10 MG/1
10 TABLET, FILM COATED ORAL DAILY
Qty: 90 TABLET | Refills: 3 | Status: SHIPPED | OUTPATIENT
Start: 2024-11-20

## 2024-11-20 SDOH — HEALTH STABILITY: PHYSICAL HEALTH
ON AVERAGE, HOW MANY DAYS PER WEEK DO YOU ENGAGE IN MODERATE TO STRENUOUS EXERCISE (LIKE A BRISK WALK)?: PATIENT DECLINED

## 2024-11-20 ASSESSMENT — SOCIAL DETERMINANTS OF HEALTH (SDOH): HOW OFTEN DO YOU GET TOGETHER WITH FRIENDS OR RELATIVES?: THREE TIMES A WEEK

## 2024-11-20 NOTE — PROGRESS NOTES
Preventive Care Visit  [facility] ARIA Jefferson MD, Family Medicine  Nov 20, 2024      Assessment & Plan     Encounter for Medicare annual wellness exam  Overall doing well, patient with no concerns. Lives independently without problems.    Hypertension goal BP (blood pressure) < 140/80  BP elevated today 169/74. Per chart review, has been consistently elevated. Has cuff at home but does not check BP at home. Takes only metoprolol for BP meds. Will obtain BMP, if normal plant to start valsartan.  - BASIC METABOLIC PANEL; Future  - valsartan (DIOVAN) 80 MG tablet; Take 1 tablet (80 mg) by mouth daily.  - UA with Microscopic; Future  - Albumin Random Urine Quantitative with Creat Ratio; Future    Hyperlipidemia with target LDL less than 130  Elevated ASCVD risk--shared decision making with patient regarding increasing lipitor 10mg to high intensity dose. Patient reports adverse side effects of muscle pain at higher statin dose. Will keep at 10mg. Could consider changing to rosuvastatin for higher intensity at lower dose.  - Lipid panel reflex to direct LDL Non-fasting; Future  - Glucose; Future  - atorvastatin (LIPITOR) 10 MG tablet; Take 1 tablet (10 mg) by mouth daily.    Screen for colon cancer  Last colonoscopy 2019, had recommended a 5 year follow up--one year overdue, and now 74 years old. Shared decision making done with patient, he would like to complete colonoscopy, will likely be his last.   -referral placed to colonoscopy    Counseling  Appropriate preventive services were addressed with this patient via screening, questionnaire, or discussion as appropriate for fall prevention, nutrition, physical activity, Tobacco-use cessation, social engagement, weight loss and cognition.  Checklist reviewing preventive services available has been given to the patient.  Reviewed patient's diet, addressing concerns and/or questions.     Follow up for BP recheck 2 weeks after patient starts new BP  medication.    Tyrell Diamond is a 74 year old, presenting for the following:  Physical          11/20/2024    12:57 PM   Additional Questions   Roomed by Ohn   Accompanied by Self         11/20/2024    Information    services provided? No              HPI    Health Care Directive  Patient has a Health Care Directive on file  Advance care planning document is on file and is current.      11/20/2024   General Health   How would you rate your overall physical health? Good   Feel stress (tense, anxious, or unable to sleep) Not at all            11/20/2024   Nutrition   Diet: Regular (no restrictions)            11/20/2024   Exercise   Days per week of moderate/strenous exercise Patient declined            11/20/2024   Social Factors   Frequency of gathering with friends or relatives Three times a week   Worry food won't last until get money to buy more No   Food not last or not have enough money for food? No   Do you have housing? (Housing is defined as stable permanent housing and does not include staying ouside in a car, in a tent, in an abandoned building, in an overnight shelter, or couch-surfing.) Yes   Are you worried about losing your housing? No   Lack of transportation? No   Unable to get utilities (heat,electricity)? No            11/20/2024   Fall Risk   Fallen 2 or more times in the past year? No    Trouble with walking or balance? No        Patient-reported          11/20/2024   Activities of Daily Living- Home Safety   Needs help with the following daily activites None of the above   Safety concerns in the home None of the above            11/20/2024   Dental   Dentist two times every year? Yes            11/20/2024   Hearing Screening   Hearing concerns? None of the above            11/20/2024   Driving Risk Screening   Patient/family members have concerns about driving No            11/20/2024   General Alertness/Fatigue Screening   Have you been more tired than usual lately?  No            11/20/2024   Urinary Incontinence Screening   Bothered by leaking urine in past 6 months No            11/20/2024   TB Screening   Were you born outside of the [place]? No            Today's PHQ-2 Score:       11/20/2024    12:53 PM   PHQ-2 ( 1999 Pfizer)   Q1: Little interest or pleasure in doing things 0    Q2: Feeling down, depressed or hopeless 0    PHQ-2 Score 0    Q1: Little interest or pleasure in doing things Not at all   Q2: Feeling down, depressed or hopeless Not at all   PHQ-2 Score 0       Patient-reported           11/20/2024   Substance Use   Alcohol more than 3/day or more than 7/wk Not Applicable   Do you have a current opioid prescription? No   How severe/bad is pain from 1 to 10? 1/10   Do you use any other substances recreationally? No        Social History     Tobacco Use    Smoking status: Former    Smokeless tobacco: Never    Tobacco comments:     SMOKED BRIEFLY IN HIS 20'S under pack per day   Substance Use Topics    Alcohol use: No    Drug use: No       ASCVD Risk   The 10-year ASCVD risk score (Nikki JAVIER, et al., 2019) is: 39.8%    Values used to calculate the score:      Age: 74 years      Sex: Male      Is Non- : No      Diabetic: No      Tobacco smoker: No      Systolic Blood Pressure: 169 mmHg      Is BP treated: Yes      HDL Cholesterol: 73 mg/dL      Total Cholesterol: 185 mg/dL          Reviewed and updated as needed this visit by Provider                    Family hx reviewed  Med list reviewed  PMH reviewed    Current providers sharing in care for this patient include:  Patient Care Team:  Chaparrita Jefferson MD as PCP - General (Student in organized health care education/training program)  Chaparrita Jefferson MD as Assigned PCP    The following health maintenance items are reviewed in Epic and correct as of today:  Health Maintenance   Topic Date Due    BMP  01/15/2022    ZOSTER IMMUNIZATION (2 of 2) 12/29/2022    LIPID  07/21/2023    GLUCOSE   "01/15/2024    COLORECTAL CANCER SCREENING  03/28/2024    INFLUENZA VACCINE (1) 09/01/2024    COVID-19 Vaccine (7 - 2024-25 season) 09/01/2024    MEDICARE ANNUAL WELLNESS VISIT  11/17/2024    FALL RISK ASSESSMENT  11/20/2025    ADVANCE CARE PLANNING  11/20/2028    DTAP/TDAP/TD IMMUNIZATION (4 - Td or Tdap) 11/22/2033    HEPATITIS C SCREENING  Completed    PHQ-2 (once per calendar year)  Completed    Pneumococcal Vaccine: 65+ Years  Completed    RSV VACCINE  Completed    HPV IMMUNIZATION  Aged Out    MENINGITIS IMMUNIZATION  Aged Out    RSV MONOCLONAL ANTIBODY  Aged Out    LUNG CANCER SCREENING  Discontinued        Objective    Exam  BP (!) 169/74   Pulse 64   Temp 97.6  F (36.4  C) (Oral)   Resp 12   Ht 1.753 m (5' 9\")   Wt 59.9 kg (132 lb)   SpO2 99%   BMI 19.49 kg/m     Estimated body mass index is 19.49 kg/m  as calculated from the following:    Height as of this encounter: 1.753 m (5' 9\").    Weight as of this encounter: 59.9 kg (132 lb).    Physical Exam  Constitutional:       General: He is not in acute distress.     Appearance: Normal appearance.   HENT:      Head: Normocephalic and atraumatic.      Mouth/Throat:      Mouth: Mucous membranes are dry.      Pharynx: Oropharynx is clear.   Eyes:      Extraocular Movements: Extraocular movements intact.      Pupils: Pupils are equal, round, and reactive to light.   Cardiovascular:      Rate and Rhythm: Normal rate and regular rhythm.      Pulses: Normal pulses.      Heart sounds: Normal heart sounds. No murmur heard.     No friction rub. No gallop.   Pulmonary:      Effort: Pulmonary effort is normal. No respiratory distress.      Breath sounds: Normal breath sounds. No stridor. No wheezing, rhonchi or rales.   Abdominal:      General: Abdomen is flat. There is no distension.      Palpations: Abdomen is soft.      Tenderness: There is no abdominal tenderness.   Musculoskeletal:         General: No swelling. Normal range of motion.      Cervical back: Normal " range of motion. No tenderness.   Lymphadenopathy:      Cervical: No cervical adenopathy.   Skin:     General: Skin is warm and dry.   Neurological:      General: No focal deficit present.      Mental Status: He is alert and oriented to person, place, and time. Mental status is at baseline.               11/20/2024   Mini Cog   Clock Draw Score 2 Normal   3 Item Recall 3 objects recalled   Mini Cog Total Score 5                 Signed Electronically by: Chaparrita Jefferson MD

## 2024-11-20 NOTE — PATIENT INSTRUCTIONS
Patient Education       Please switch your blood pressure medication to valsartan and stop your metoprolol.  Check your blood pressure at home 2-3 times a week, write it down and bring it to your next appointment.    Preventive Care Advice   This is general advice given by our system to help you stay healthy. However, your care team may have specific advice just for you. Please talk to your care team about your preventive care needs.  Nutrition  Eat 5 or more servings of fruits and vegetables each day.  Try wheat bread, brown rice and whole grain pasta (instead of white bread, rice, and pasta).  Get enough calcium and vitamin D. Check the label on foods and aim for 100% of the RDA (recommended daily allowance).  Lifestyle  Exercise at least 150 minutes each week  (30 minutes a day, 5 days a week).  Do muscle strengthening activities 2 days a week. These help control your weight and prevent disease.  No smoking.  Wear sunscreen to prevent skin cancer.  Have a dental exam and cleaning every 6 months.  Yearly exams  See your health care team every year to talk about:  Any changes in your health.  Any medicines your care team has prescribed.  Preventive care, family planning, and ways to prevent chronic diseases.  Shots (vaccines)   HPV shots (up to age 26), if you've never had them before.  Hepatitis B shots (up to age 59), if you've never had them before.  COVID-19 shot: Get this shot when it's due.  Flu shot: Get a flu shot every year.  Tetanus shot: Get a tetanus shot every 10 years.  Pneumococcal, hepatitis A, and RSV shots: Ask your care team if you need these based on your risk.  Shingles shot (for age 50 and up)  General health tests  Diabetes screening:  Starting at age 35, Get screened for diabetes at least every 3 years.  If you are younger than age 35, ask your care team if you should be screened for diabetes.  Cholesterol test: At age 39, start having a cholesterol test every 5 years, or more often if  advised.  Bone density scan (DEXA): At age 50, ask your care team if you should have this scan for osteoporosis (brittle bones).  Hepatitis C: Get tested at least once in your life.  STIs (sexually transmitted infections)  Before age 24: Ask your care team if you should be screened for STIs.  After age 24: Get screened for STIs if you're at risk. You are at risk for STIs (including HIV) if:  You are sexually active with more than one person.  You don't use condoms every time.  You or a partner was diagnosed with a sexually transmitted infection.  If you are at risk for HIV, ask about PrEP medicine to prevent HIV.  Get tested for HIV at least once in your life, whether you are at risk for HIV or not.  Cancer screening tests  Cervical cancer screening: If you have a cervix, begin getting regular cervical cancer screening tests starting at age 21.  Breast cancer scan (mammogram): If you've ever had breasts, begin having regular mammograms starting at age 40. This is a scan to check for breast cancer.  Colon cancer screening: It is important to start screening for colon cancer at age 45.  Have a colonoscopy test every 10 years (or more often if you're at risk) Or, ask your provider about stool tests like a FIT test every year or Cologuard test every 3 years.  To learn more about your testing options, visit:   .  For help making a decision, visit:   https://bit.ly/qg88774.  Prostate cancer screening test: If you have a prostate, ask your care team if a prostate cancer screening test (PSA) at age 55 is right for you.  Lung cancer screening: If you are a current or former smoker ages 50 to 80, ask your care team if ongoing lung cancer screenings are right for you.  For informational purposes only. Not to replace the advice of your health care provider. Copyright   2023 [employer]. All rights reserved. Clinically reviewed by the [facility]. Experience [employer] 836966 - REV 01/24.

## 2024-11-21 LAB
ANION GAP SERPL CALCULATED.3IONS-SCNC: 7 MMOL/L (ref 7–15)
BUN SERPL-MCNC: 16.5 MG/DL (ref 8–23)
CALCIUM SERPL-MCNC: 9.4 MG/DL (ref 8.8–10.4)
CHLORIDE SERPL-SCNC: 98 MMOL/L (ref 98–107)
CHOLEST SERPL-MCNC: 182 MG/DL
CREAT SERPL-MCNC: 0.91 MG/DL (ref 0.67–1.17)
CREAT UR-MCNC: 79.6 MG/DL
EGFRCR SERPLBLD CKD-EPI 2021: 87 ML/MIN/1.73M2
FASTING STATUS PATIENT QL REPORTED: NO
GLUCOSE SERPL-MCNC: 118 MG/DL (ref 70–99)
GLUCOSE SERPL-MCNC: 118 MG/DL (ref 70–99)
HCO3 SERPL-SCNC: 30 MMOL/L (ref 22–29)
HDLC SERPL-MCNC: 77 MG/DL
LDLC SERPL CALC-MCNC: 91 MG/DL
MICROALBUMIN UR-MCNC: 41.6 MG/L
MICROALBUMIN/CREAT UR: 52.26 MG/G CR (ref 0–17)
NONHDLC SERPL-MCNC: 105 MG/DL
POTASSIUM SERPL-SCNC: 5.4 MMOL/L (ref 3.4–5.3)
SODIUM SERPL-SCNC: 135 MMOL/L (ref 135–145)
TRIGL SERPL-MCNC: 71 MG/DL

## 2024-11-25 ENCOUNTER — LAB (OUTPATIENT)
Dept: LAB | Facility: CLINIC | Age: 74
End: 2024-11-25
Payer: COMMERCIAL

## 2024-11-25 DIAGNOSIS — E87.5 HYPERKALEMIA: ICD-10-CM

## 2024-11-25 LAB — POTASSIUM SERPL-SCNC: 5.3 MMOL/L (ref 3.4–5.3)

## 2024-12-03 DIAGNOSIS — I10 HYPERTENSION GOAL BP (BLOOD PRESSURE) < 140/80: Primary | ICD-10-CM

## 2024-12-04 ENCOUNTER — TELEPHONE (OUTPATIENT)
Dept: FAMILY MEDICINE | Facility: CLINIC | Age: 74
End: 2024-12-04
Payer: COMMERCIAL

## 2024-12-04 DIAGNOSIS — I10 HYPERTENSION GOAL BP (BLOOD PRESSURE) < 140/80: Primary | ICD-10-CM

## 2024-12-04 NOTE — TELEPHONE ENCOUNTER
----- Message from Chaparrita Jefferson sent at 12/3/2024  6:51 PM CST -----  Hi, can you please call patient and let him know that his potassium was normal, but at the very high range of normal. He can start the valsartan medication but we will need to recheck his potassium closely. I will order a potassium check for 3 days after he starts the medication and two week after starting the medication. He can do these as lab only visits. If these numbers are normal, we can stop checking his K.    Thanks,  Chaparrita

## 2024-12-07 RX ORDER — AMLODIPINE BESYLATE 5 MG/1
5 TABLET ORAL DAILY
Qty: 60 TABLET | Refills: 0 | Status: SHIPPED | OUTPATIENT
Start: 2024-12-07

## 2024-12-11 NOTE — TELEPHONE ENCOUNTER
Called and left voicemail with instructions. Informed to call back with any questions.   Anya Anthony RN

## 2024-12-15 DIAGNOSIS — I10 HYPERTENSION GOAL BP (BLOOD PRESSURE) < 140/80: ICD-10-CM

## 2024-12-16 RX ORDER — METOPROLOL SUCCINATE 50 MG/1
TABLET, EXTENDED RELEASE ORAL
Qty: 90 TABLET | Refills: 3 | Status: SHIPPED | OUTPATIENT
Start: 2024-12-16

## 2024-12-16 NOTE — TELEPHONE ENCOUNTER
"Request for medication refill:  metoprolol succinate ER (TOPROL XL) 50 MG 24 hr tablet     Providers if patient needs an appointment and you are willing to give a one month supply please refill for one month and  send a letter/MyChart using \".SMILLIMITEDREFILL\" .smillimited and route chart to \"P SMI \" (Giving one month refill in non controlled medications is strongly recommended before denial)    If refill has been denied, meaning absolutely no refills without visit, please complete the smart phrase \".smirxrefuse\" and route it to the \"P SMI MED REFILLS\"  pool to inform the patient and the pharmacy.    Michael Gan, CMA      "

## 2025-01-20 ENCOUNTER — OFFICE VISIT (OUTPATIENT)
Dept: FAMILY MEDICINE | Facility: CLINIC | Age: 75
End: 2025-01-20
Payer: COMMERCIAL

## 2025-01-20 VITALS
SYSTOLIC BLOOD PRESSURE: 137 MMHG | BODY MASS INDEX: 19.55 KG/M2 | OXYGEN SATURATION: 98 % | WEIGHT: 132 LBS | RESPIRATION RATE: 15 BRPM | HEIGHT: 69 IN | HEART RATE: 92 BPM | TEMPERATURE: 98.2 F | DIASTOLIC BLOOD PRESSURE: 78 MMHG

## 2025-01-20 DIAGNOSIS — Z12.11 SCREEN FOR COLON CANCER: ICD-10-CM

## 2025-01-20 DIAGNOSIS — E78.00 PURE HYPERCHOLESTEROLEMIA: ICD-10-CM

## 2025-01-20 DIAGNOSIS — I10 HYPERTENSION GOAL BP (BLOOD PRESSURE) < 140/80: Primary | ICD-10-CM

## 2025-01-20 DIAGNOSIS — E78.5 HYPERLIPIDEMIA WITH TARGET LDL LESS THAN 130: ICD-10-CM

## 2025-01-20 DIAGNOSIS — R80.9 PROTEINURIA, UNSPECIFIED TYPE: ICD-10-CM

## 2025-01-20 LAB
ALBUMIN SERPL BCG-MCNC: 4.4 G/DL (ref 3.5–5.2)
ALP SERPL-CCNC: 89 U/L (ref 40–150)
ALT SERPL W P-5'-P-CCNC: 19 U/L (ref 0–70)
ANION GAP SERPL CALCULATED.3IONS-SCNC: 8 MMOL/L (ref 7–15)
AST SERPL W P-5'-P-CCNC: 26 U/L (ref 0–45)
BILIRUB SERPL-MCNC: 0.7 MG/DL
BUN SERPL-MCNC: 20.9 MG/DL (ref 8–23)
CALCIUM SERPL-MCNC: 9.5 MG/DL (ref 8.8–10.4)
CHLORIDE SERPL-SCNC: 100 MMOL/L (ref 98–107)
CREAT SERPL-MCNC: 0.88 MG/DL (ref 0.67–1.17)
EGFRCR SERPLBLD CKD-EPI 2021: 89 ML/MIN/1.73M2
GLUCOSE SERPL-MCNC: 136 MG/DL (ref 70–99)
HCO3 SERPL-SCNC: 31 MMOL/L (ref 22–29)
POTASSIUM SERPL-SCNC: 4.7 MMOL/L (ref 3.4–5.3)
PROT SERPL-MCNC: 7.5 G/DL (ref 6.4–8.3)
SODIUM SERPL-SCNC: 139 MMOL/L (ref 135–145)

## 2025-01-20 RX ORDER — AMLODIPINE BESYLATE 5 MG/1
5 TABLET ORAL DAILY
Qty: 90 TABLET | Refills: 3 | Status: SHIPPED | OUTPATIENT
Start: 2025-01-20

## 2025-01-20 RX ORDER — ATORVASTATIN CALCIUM 20 MG/1
20 TABLET, FILM COATED ORAL DAILY
Qty: 90 TABLET | Refills: 3 | Status: SHIPPED | OUTPATIENT
Start: 2025-01-20 | End: 2025-01-21

## 2025-01-20 NOTE — PROGRESS NOTES
Assessment & Plan     Hypertension goal BP (blood pressure) < 140/80  Proteinuria  Switched from metoprolol to amlodipine 1 to 2 months ago, blood pressures have been well-controlled since then.  Reviewed home log which showed systolics of 120s and systolics of 70s.  Blood pressure in clinic today 137/78.  No side effects including no dizziness or lower extremity swelling.  Continue 5 mg amlodipine.  Originally was going to start an ARB due to proteinuria however with potassium levels borderline, opted for calcium channel blocker.  - amLODIPine (NORVASC) 5 MG tablet; Take 1 tablet (5 mg) by mouth daily.    Hyperlipidemia with target LDL less than 130  ASCVD risk elevated to 28%.  Already taking Lipitor 10 mg, increased to 20 mg today.  LFTs have not been taken since 2021, will recheck today.  - Comprehensive metabolic panel; Future  - atorvastatin (LIPITOR) 20 MG tablet; Take 1 tablet (20 mg) by mouth daily.    Screen for colon cancer  History of adenomatous polyp, due for colonoscopy (every 5 years, last one in 2019).  -Patient planning to follow-up at [facility] where he got his prior colonoscopies      Return in about 6 months (around 7/20/2025).    The longitudinal plan of care for the diagnosis(es)/condition(s) as documented were addressed during this visit. Due to the added complexity in care, I will continue to support Lobo in the subsequent management and with ongoing continuity of care.      Subjective   Lobo is a 74 year old, presenting for the following health issues:  Follow Up (Follow up )      1/20/2025    10:26 AM   Additional Questions   Roomed by Lyrik   Accompanied by wife         1/20/2025   Declines Weight   Did patient decline having their weight taken? Yes         1/20/2025    Information    services provided? No     HPI   Started taking amlodipine.  No dizziness/lighteheadnedd/no swelling in the legs.  BP has been 114-134/64-74    The 10-year ASCVD  "risk score (Nikki JAVIER, et al., 2019) is: 28.8%    Values used to calculate the score:      Age: 74 years      Sex: Male      Is Non- : No      Diabetic: No      Tobacco smoker: No      Systolic Blood Pressure: 137 mmHg      Is BP treated: Yes      HDL Cholesterol: 77 mg/dL      Total Cholesterol: 182 mg/dL       Objective    /78   Pulse 92   Temp 98.2  F (36.8  C) (Oral)   Resp 15   Ht 1.753 m (5' 9\")   Wt 59.9 kg (132 lb)   SpO2 98%   BMI 19.49 kg/m    Body mass index is 19.49 kg/m .  Physical Exam  Constitutional:       General: He is not in acute distress.     Appearance: Normal appearance.   HENT:      Head: Normocephalic and atraumatic.      Mouth/Throat:      Mouth: Mucous membranes are dry.      Dentition: Dental caries present.      Pharynx: Oropharynx is clear.   Eyes:      Extraocular Movements: Extraocular movements intact.      Pupils: Pupils are equal, round, and reactive to light.   Cardiovascular:      Rate and Rhythm: Normal rate and regular rhythm.      Pulses: Normal pulses.      Heart sounds: Normal heart sounds. No murmur heard.     No friction rub. No gallop.   Pulmonary:      Effort: Pulmonary effort is normal. No respiratory distress.      Breath sounds: Normal breath sounds. No stridor. No wheezing, rhonchi or rales.   Abdominal:      General: Abdomen is flat. There is no distension.      Palpations: Abdomen is soft.      Tenderness: There is no abdominal tenderness.   Musculoskeletal:         General: No swelling. Normal range of motion.      Cervical back: Normal range of motion. No tenderness.   Lymphadenopathy:      Cervical: No cervical adenopathy.   Skin:     General: Skin is warm and dry.      Comments: 3 cm patch of erythema consistent with birthmark on posterior neck   Neurological:      General: No focal deficit present.      Mental Status: He is alert and oriented to person, place, and time. Mental status is at baseline.          Signed " Electronically by: Chaparrita Jefferson MD

## 2025-01-20 NOTE — LETTER
January 22, 2025      Lobo RUIZ Ferny  [address]  [facility] [address]        Dear ,    We are writing to inform you of your test results.    Your test results fall within the expected range(s) or remain unchanged from previous results.  Please continue with current treatment plan.    Resulted Orders   Comprehensive metabolic panel   Result Value Ref Range    Sodium 139 135 - 145 mmol/L    Potassium 4.7 3.4 - 5.3 mmol/L    Carbon Dioxide (CO2) 31 (H) 22 - 29 mmol/L    Anion Gap 8 7 - 15 mmol/L    Urea Nitrogen 20.9 8.0 - 23.0 mg/dL    Creatinine 0.88 0.67 - 1.17 mg/dL    GFR Estimate 89 >60 mL/min/1.73m2      Comment:      eGFR calculated using 2021 CKD-EPI equation.    Calcium 9.5 8.8 - 10.4 mg/dL      Comment:      Reference intervals for this test were updated on 7/16/2024 to reflect our healthy population more accurately. There may be differences in the flagging of prior results with similar values performed with this method. Those prior results can be interpreted in the context of the updated reference intervals.    Chloride 100 98 - 107 mmol/L    Glucose 136 (H) 70 - 99 mg/dL    Alkaline Phosphatase 89 40 - 150 U/L    AST 26 0 - 45 U/L    ALT 19 0 - 70 U/L    Protein Total 7.5 6.4 - 8.3 g/dL    Albumin 4.4 3.5 - 5.2 g/dL    Bilirubin Total 0.7 <=1.2 mg/dL       If you have any questions or concerns, please call the clinic at the number listed above.       Sincerely,      Chaparrita Jefferson MD    Electronically signed

## 2025-01-20 NOTE — PROGRESS NOTES
Preceptor Attestation:   Patient seen, evaluated and discussed with the resident. I have verified the content of the note, which accurately reflects my assessment of the patient and the plan of care.   Supervising Physician:  Mandeep Burger MD

## 2025-01-20 NOTE — PATIENT INSTRUCTIONS
Patient Education   Here is the plan from today's visit    1. Hypertension goal BP (blood pressure) < 140/80  - amLODIPine (NORVASC) 5 MG tablet; Take 1 tablet (5 mg) by mouth daily.  Dispense: 90 tablet; Refill: 3    2. Screen for colon cancer (Primary)    3. Hyperlipidemia with target LDL less than 130  - Comprehensive metabolic panel; Future  - atorvastatin (LIPITOR) 20 MG tablet; Take 1 tablet (20 mg) by mouth daily.  Dispense: 90 tablet; Refill: 3    4. Pure hypercholesterolemia    Please call or return to clinic if your symptoms don't go away.    Follow up plan  Return in about 6 months (around 7/20/2025).    Thank you for coming to [facility] today.  Lab Testing:  **If you had lab testing today and your results are reassuring or normal they will be mailed to you or sent through [employer] within 7 days.   **If the lab tests need quick action we will call you with the results.  **If you are having labs done on a different day, please call [phone] to schedule at [facility] or [phone] for other [facility] Outpatient Lab locations. Labs do not offer walk-in appointments.  The phone number we will call with results is # [phone] (home) . If this is not the best number please call our clinic and change the number.  Medication Refills:  If you need any refills please call your pharmacy and they will contact us.   If you need to  your refill at a new pharmacy, please contact the new pharmacy directly. The new pharmacy will help you get your medications transferred faster.   Scheduling:  If you have any concerns about today's visit or wish to schedule another appointment please call our office during normal business hours [phone] (8-5:00 M-F). If you can no longer make a scheduled visit, please cancel via [employer] or call us to cancel.   If a referral was made to an [facility] specialty provider and you do not get a call from central scheduling, please refer to directions on  your visit summary or call our office during normal business hours for assistance.   If a Mammogram was ordered for you at the Breast Center call [phone] to schedule or change your appointment.  If you had an XRay/CT/Ultrasound/MRI ordered the number is [phone] to schedule or change your radiology appointment.   [facility] has limited ultrasound appointments available on Wednesdays, if you would like your ultrasound at [facility], please call [phone] to schedule.   Medical Concerns:  If you have urgent medical concerns please call [phone] at any time of the day.    Chaparrita Jefferson MD

## 2025-01-21 ENCOUNTER — TELEPHONE (OUTPATIENT)
Dept: FAMILY MEDICINE | Facility: CLINIC | Age: 75
End: 2025-01-21
Payer: COMMERCIAL

## 2025-01-21 RX ORDER — ROSUVASTATIN CALCIUM 10 MG/1
10 TABLET, COATED ORAL DAILY
Qty: 90 TABLET | Refills: 1 | Status: SHIPPED | OUTPATIENT
Start: 2025-01-21

## 2025-01-21 NOTE — TELEPHONE ENCOUNTER
RN spoke with provider who stated she will send a new statin. RN called pt and relayed provider message. Pt verbalized understanding.     Rosalba Brannon RN

## 2025-01-21 NOTE — TELEPHONE ENCOUNTER
[facility] phone call message- medication clarification/question:    Full Medication Name: atorvastatin (LIPITOR)    Dose: 20 MG tablet     Question: Patient wants to discuss the higher dosing of this medication as he states that he has had side effects from taking this dose before and he believes that he spoke to  about this when he started seeing her.    Pharmacy confirmed as [facility] [id number] [place] [address]: Yes    OK to leave a message on voice mail? Yes    Primary language: English      needed? No    Call taken on January 21, 2025 at 9:31 AM by Summer Marcos

## 2025-01-21 NOTE — TELEPHONE ENCOUNTER
RN spoke with pt who stated he was on 20mg of Atorvastatin before and had side effects. He stated he discussed this with her back in November. He wants to know if he should stay on the 10mg and should he start the 20mg and monitor for side effects. RN will discuss with provider and call pt back with recommendation.      Rosalba Brannon RN

## 2025-07-09 DIAGNOSIS — E78.5 HYPERLIPIDEMIA WITH TARGET LDL LESS THAN 130: ICD-10-CM

## 2025-07-09 RX ORDER — ROSUVASTATIN CALCIUM 10 MG/1
10 TABLET, COATED ORAL DAILY
Qty: 90 TABLET | Refills: 1 | Status: SHIPPED | OUTPATIENT
Start: 2025-07-09

## 2025-07-09 NOTE — TELEPHONE ENCOUNTER
"Request for medication refill:    Medication Name:     rosuvastatin (CRESTOR) 10 MG tablet     Providers if patient needs an appointment and you are willing to give a one month supply please refill for one month and  send a MyChart using \".SMILLIMITEDREFILL\" .Or route chart to \"P SMI \" . And use the phrase \" SMIRXFOLLOWUP\"To call patient and inform of limited refill and providers request to make an appointment. (Giving one month refill in non controlled medications is strongly recommended before denial)    If refill has been denied, meaning absolutely no refills without visit, please complete the smart phrase \".SMIRXREFUSE\" and route it to the \"P SMI MED REFILLS\"  pool to inform the patient and the pharmacy.    Deion Kruse MA     " [Adequate] : adequate

## 2025-07-09 NOTE — TELEPHONE ENCOUNTER
[facility] phone call message- patient requesting a refill:    Full Medication Name: rosuvastatin (CRESTOR) 10 MG tablet     Patient requesting bridge refill.  Has scheduled follow up with Dr. Fisher for 7/29    Pharmacy confirmed as   [id number] [place] [facility] - [address]  [address] [facility] [address]  Phone: [phone] Fax: [phone]    Yes    Additional Comments: none     OK to leave a message on voice mail? Yes    Primary language: English      needed? No    Call taken on July 9, 2025 at 10:13 AM by Arabella Salas

## 2025-07-29 ENCOUNTER — OFFICE VISIT (OUTPATIENT)
Dept: FAMILY MEDICINE | Facility: CLINIC | Age: 75
End: 2025-07-29
Payer: COMMERCIAL

## 2025-07-29 VITALS
SYSTOLIC BLOOD PRESSURE: 134 MMHG | BODY MASS INDEX: 19.09 KG/M2 | DIASTOLIC BLOOD PRESSURE: 78 MMHG | RESPIRATION RATE: 16 BRPM | OXYGEN SATURATION: 98 % | WEIGHT: 128.9 LBS | HEIGHT: 69 IN | TEMPERATURE: 97.8 F | HEART RATE: 88 BPM

## 2025-07-29 DIAGNOSIS — H91.93 BILATERAL HEARING LOSS, UNSPECIFIED HEARING LOSS TYPE: ICD-10-CM

## 2025-07-29 DIAGNOSIS — E78.5 HYPERLIPIDEMIA WITH TARGET LDL LESS THAN 130: ICD-10-CM

## 2025-07-29 DIAGNOSIS — R80.9 PROTEINURIA, UNSPECIFIED TYPE: ICD-10-CM

## 2025-07-29 DIAGNOSIS — I10 HYPERTENSION GOAL BP (BLOOD PRESSURE) < 140/80: Primary | ICD-10-CM

## 2025-07-31 ENCOUNTER — LAB (OUTPATIENT)
Dept: LAB | Facility: CLINIC | Age: 75
End: 2025-07-31
Payer: COMMERCIAL

## 2025-07-31 DIAGNOSIS — R80.9 PROTEINURIA, UNSPECIFIED TYPE: ICD-10-CM

## 2025-07-31 DIAGNOSIS — E78.5 HYPERLIPIDEMIA WITH TARGET LDL LESS THAN 130: ICD-10-CM

## 2025-07-31 DIAGNOSIS — I10 HYPERTENSION GOAL BP (BLOOD PRESSURE) < 140/80: ICD-10-CM

## 2025-07-31 LAB
ANION GAP SERPL CALCULATED.3IONS-SCNC: 9 MMOL/L (ref 7–15)
BUN SERPL-MCNC: 22.2 MG/DL (ref 8–23)
CALCIUM SERPL-MCNC: 9.2 MG/DL (ref 8.8–10.4)
CHLORIDE SERPL-SCNC: 100 MMOL/L (ref 98–107)
CHOLEST SERPL-MCNC: 160 MG/DL
CREAT SERPL-MCNC: 0.91 MG/DL (ref 0.67–1.17)
CREAT UR-MCNC: 107 MG/DL
EGFRCR SERPLBLD CKD-EPI 2021: 87 ML/MIN/1.73M2
FASTING STATUS PATIENT QL REPORTED: YES
FASTING STATUS PATIENT QL REPORTED: YES
GLUCOSE SERPL-MCNC: 111 MG/DL (ref 70–99)
HCO3 SERPL-SCNC: 28 MMOL/L (ref 22–29)
HDLC SERPL-MCNC: 57 MG/DL
LDLC SERPL CALC-MCNC: 94 MG/DL
MICROALBUMIN UR-MCNC: 28.4 MG/L
MICROALBUMIN/CREAT UR: 26.54 MG/G CR (ref 0–17)
NONHDLC SERPL-MCNC: 103 MG/DL
POTASSIUM SERPL-SCNC: 4.8 MMOL/L (ref 3.4–5.3)
SODIUM SERPL-SCNC: 137 MMOL/L (ref 135–145)
TRIGL SERPL-MCNC: 46 MG/DL

## (undated) DEVICE — ESU CORD MONOPOLAR HIGH FREQUENCY 26006M-D/10

## (undated) DEVICE — ESU GROUND PAD UNIVERSAL W/O CORD

## (undated) DEVICE — GLOVE PROTEXIS W/NEU-THERA 8.0  2D73TE80

## (undated) DEVICE — PAD CHUX UNDERPAD 23X24" 7136

## (undated) DEVICE — RAD RX ISOVUE 300 (50ML) 61% IOPAMIDOL CHARGE PER ML

## (undated) DEVICE — SOL WATER IRRIG 3000ML BAG 2B7117

## (undated) DEVICE — CATH URETERAL OPEN END 6FR AXXCESS

## (undated) DEVICE — EVACUATOR BLADDER UROVAC LATEX M0067301250

## (undated) DEVICE — CATH FOLEY 20FR 5ML LUBRICATH LATEX 0165L20

## (undated) DEVICE — Device

## (undated) DEVICE — BAG URINARY DRAIN 2000ML LF 154002

## (undated) DEVICE — CATH PLUG W/CAP 000076

## (undated) DEVICE — BAG CYSTO TABLE DRAIN

## (undated) DEVICE — BAG URINARY 4000ML

## (undated) DEVICE — SYR 50ML CATH TIP W/O NDL 309620

## (undated) DEVICE — PACK CYSTOSCOPY SBA15CYFSI

## (undated) DEVICE — PACK TUR CUSTOM SBA15RUFSE

## (undated) DEVICE — CATH FOLEY 3WAY 20FR 30ML LATEX 0167SI20

## (undated) DEVICE — CATH FOLEY 18FR 30ML LATEX 0166SI18

## (undated) DEVICE — ESU ELEC ROLLERBALL 24FR 3MM  27050N

## (undated) DEVICE — ESU ELEC LOOP 24FR 20750G

## (undated) DEVICE — CABLE HIGH FREQEUCY STERILE 8MM PLUG 300CM 277KB-D/10

## (undated) RX ORDER — PROPOFOL 10 MG/ML
INJECTION, EMULSION INTRAVENOUS
Status: DISPENSED
Start: 2018-03-15

## (undated) RX ORDER — CEFAZOLIN SODIUM 2 G/100ML
INJECTION, SOLUTION INTRAVENOUS
Status: DISPENSED
Start: 2018-03-15

## (undated) RX ORDER — ONDANSETRON 2 MG/ML
INJECTION INTRAMUSCULAR; INTRAVENOUS
Status: DISPENSED
Start: 2021-01-19

## (undated) RX ORDER — DEXAMETHASONE SODIUM PHOSPHATE 4 MG/ML
INJECTION, SOLUTION INTRA-ARTICULAR; INTRALESIONAL; INTRAMUSCULAR; INTRAVENOUS; SOFT TISSUE
Status: DISPENSED
Start: 2021-01-19

## (undated) RX ORDER — PROPOFOL 10 MG/ML
INJECTION, EMULSION INTRAVENOUS
Status: DISPENSED
Start: 2021-01-19

## (undated) RX ORDER — FENTANYL CITRATE 50 UG/ML
INJECTION, SOLUTION INTRAMUSCULAR; INTRAVENOUS
Status: DISPENSED
Start: 2021-01-19

## (undated) RX ORDER — ONDANSETRON 2 MG/ML
INJECTION INTRAMUSCULAR; INTRAVENOUS
Status: DISPENSED
Start: 2018-03-08

## (undated) RX ORDER — PROPOFOL 10 MG/ML
INJECTION, EMULSION INTRAVENOUS
Status: DISPENSED
Start: 2018-03-08

## (undated) RX ORDER — GLYCOPYRROLATE 0.2 MG/ML
INJECTION, SOLUTION INTRAMUSCULAR; INTRAVENOUS
Status: DISPENSED
Start: 2021-01-19

## (undated) RX ORDER — FENTANYL CITRATE 50 UG/ML
INJECTION, SOLUTION INTRAMUSCULAR; INTRAVENOUS
Status: DISPENSED
Start: 2018-03-15

## (undated) RX ORDER — FENTANYL CITRATE 50 UG/ML
INJECTION, SOLUTION INTRAMUSCULAR; INTRAVENOUS
Status: DISPENSED
Start: 2018-03-08

## (undated) RX ORDER — CEFAZOLIN SODIUM 2 G/100ML
INJECTION, SOLUTION INTRAVENOUS
Status: DISPENSED
Start: 2021-01-19

## (undated) RX ORDER — CEFAZOLIN SODIUM 2 G/100ML
INJECTION, SOLUTION INTRAVENOUS
Status: DISPENSED
Start: 2018-03-08

## (undated) RX ORDER — LIDOCAINE HYDROCHLORIDE 20 MG/ML
INJECTION, SOLUTION EPIDURAL; INFILTRATION; INTRACAUDAL; PERINEURAL
Status: DISPENSED
Start: 2018-03-08

## (undated) RX ORDER — DEXAMETHASONE SODIUM PHOSPHATE 4 MG/ML
INJECTION, SOLUTION INTRA-ARTICULAR; INTRALESIONAL; INTRAMUSCULAR; INTRAVENOUS; SOFT TISSUE
Status: DISPENSED
Start: 2018-03-08

## (undated) RX ORDER — LIDOCAINE HYDROCHLORIDE 20 MG/ML
INJECTION, SOLUTION EPIDURAL; INFILTRATION; INTRACAUDAL; PERINEURAL
Status: DISPENSED
Start: 2021-01-19